# Patient Record
Sex: MALE | Race: WHITE | Employment: OTHER | URBAN - METROPOLITAN AREA
[De-identification: names, ages, dates, MRNs, and addresses within clinical notes are randomized per-mention and may not be internally consistent; named-entity substitution may affect disease eponyms.]

---

## 2018-01-02 ENCOUNTER — APPOINTMENT (OUTPATIENT)
Dept: GENERAL RADIOLOGY | Age: 68
DRG: 853 | End: 2018-01-02
Attending: INTERNAL MEDICINE
Payer: MEDICARE

## 2018-01-02 ENCOUNTER — HOSPITAL ENCOUNTER (INPATIENT)
Age: 68
LOS: 7 days | Discharge: HOME HEALTH | DRG: 853 | End: 2018-01-09
Attending: EMERGENCY MEDICINE | Admitting: INTERNAL MEDICINE
Payer: MEDICARE

## 2018-01-02 ENCOUNTER — APPOINTMENT (OUTPATIENT)
Dept: CT IMAGING | Age: 68
DRG: 853 | End: 2018-01-02
Attending: SPECIALIST
Payer: MEDICARE

## 2018-01-02 DIAGNOSIS — R41.0 ACUTE DELIRIUM: ICD-10-CM

## 2018-01-02 DIAGNOSIS — G00.9 BACTERIAL MENINGITIS: Primary | ICD-10-CM

## 2018-01-02 PROBLEM — J18.9 PNEUMONIA: Status: ACTIVE | Noted: 2018-01-02

## 2018-01-02 LAB
ALBUMIN SERPL-MCNC: 2.6 G/DL (ref 3.5–5)
ALBUMIN/GLOB SERPL: 0.6 {RATIO} (ref 1.1–2.2)
ALP SERPL-CCNC: 89 U/L (ref 45–117)
ALT SERPL-CCNC: 36 U/L (ref 12–78)
AMYLASE SERPL-CCNC: 120 U/L (ref 25–115)
ANION GAP SERPL CALC-SCNC: 9 MMOL/L (ref 5–15)
AST SERPL-CCNC: 19 U/L (ref 15–37)
BASOPHILS # BLD: 0 K/UL (ref 0–0.1)
BASOPHILS NFR BLD: 0 % (ref 0–1)
BILIRUB SERPL-MCNC: 1.9 MG/DL (ref 0.2–1)
BUN SERPL-MCNC: 18 MG/DL (ref 6–20)
BUN/CREAT SERPL: 14 (ref 12–20)
CALCIUM SERPL-MCNC: 8.9 MG/DL (ref 8.5–10.1)
CHLORIDE SERPL-SCNC: 104 MMOL/L (ref 97–108)
CO2 SERPL-SCNC: 25 MMOL/L (ref 21–32)
CREAT SERPL-MCNC: 1.3 MG/DL (ref 0.7–1.3)
DIFFERENTIAL METHOD BLD: ABNORMAL
EOSINOPHIL # BLD: 0 K/UL (ref 0–0.4)
EOSINOPHIL NFR BLD: 0 % (ref 0–7)
ERYTHROCYTE [DISTWIDTH] IN BLOOD BY AUTOMATED COUNT: 12.5 % (ref 11.5–14.5)
GLOBULIN SER CALC-MCNC: 4.4 G/DL (ref 2–4)
GLUCOSE SERPL-MCNC: 140 MG/DL (ref 65–100)
HCT VFR BLD AUTO: 39.3 % (ref 36.6–50.3)
HGB BLD-MCNC: 13.5 G/DL (ref 12.1–17)
LACTATE SERPL-SCNC: 2.1 MMOL/L (ref 0.4–2)
LIPASE SERPL-CCNC: 478 U/L (ref 73–393)
LYMPHOCYTES # BLD: 0.8 K/UL (ref 0.8–3.5)
LYMPHOCYTES NFR BLD: 4 % (ref 12–49)
MCH RBC QN AUTO: 30.2 PG (ref 26–34)
MCHC RBC AUTO-ENTMCNC: 34.4 G/DL (ref 30–36.5)
MCV RBC AUTO: 87.9 FL (ref 80–99)
MONOCYTES # BLD: 0.6 K/UL (ref 0–1)
MONOCYTES NFR BLD: 3 % (ref 5–13)
NEUTS BAND NFR BLD MANUAL: 9 %
NEUTS SEG # BLD: 19.5 K/UL (ref 1.8–8)
NEUTS SEG NFR BLD: 84 % (ref 32–75)
PLATELET # BLD AUTO: 245 K/UL (ref 150–400)
PLATELET COMMENTS,PCOM: ABNORMAL
POTASSIUM SERPL-SCNC: 4 MMOL/L (ref 3.5–5.1)
PROT SERPL-MCNC: 7 G/DL (ref 6.4–8.2)
RBC # BLD AUTO: 4.47 M/UL (ref 4.1–5.7)
RBC MORPH BLD: ABNORMAL
SODIUM SERPL-SCNC: 138 MMOL/L (ref 136–145)
WBC # BLD AUTO: 20.9 K/UL (ref 4.1–11.1)
WBC MORPH BLD: ABNORMAL

## 2018-01-02 PROCEDURE — 74011000258 HC RX REV CODE- 258: Performed by: EMERGENCY MEDICINE

## 2018-01-02 PROCEDURE — 74011000258 HC RX REV CODE- 258: Performed by: INTERNAL MEDICINE

## 2018-01-02 PROCEDURE — 96360 HYDRATION IV INFUSION INIT: CPT

## 2018-01-02 PROCEDURE — 74011250636 HC RX REV CODE- 250/636: Performed by: EMERGENCY MEDICINE

## 2018-01-02 PROCEDURE — 74011250636 HC RX REV CODE- 250/636: Performed by: INTERNAL MEDICINE

## 2018-01-02 PROCEDURE — 82150 ASSAY OF AMYLASE: CPT | Performed by: INTERNAL MEDICINE

## 2018-01-02 PROCEDURE — 99284 EMERGENCY DEPT VISIT MOD MDM: CPT

## 2018-01-02 PROCEDURE — 85025 COMPLETE CBC W/AUTO DIFF WBC: CPT | Performed by: EMERGENCY MEDICINE

## 2018-01-02 PROCEDURE — 83690 ASSAY OF LIPASE: CPT | Performed by: INTERNAL MEDICINE

## 2018-01-02 PROCEDURE — 80053 COMPREHEN METABOLIC PANEL: CPT | Performed by: EMERGENCY MEDICINE

## 2018-01-02 PROCEDURE — 74011250637 HC RX REV CODE- 250/637

## 2018-01-02 PROCEDURE — 65270000029 HC RM PRIVATE

## 2018-01-02 PROCEDURE — 77030029684 HC NEB SM VOL KT MONA -A

## 2018-01-02 PROCEDURE — 74011000250 HC RX REV CODE- 250: Performed by: INTERNAL MEDICINE

## 2018-01-02 PROCEDURE — 87040 BLOOD CULTURE FOR BACTERIA: CPT | Performed by: INTERNAL MEDICINE

## 2018-01-02 PROCEDURE — 36600 WITHDRAWAL OF ARTERIAL BLOOD: CPT

## 2018-01-02 PROCEDURE — 36415 COLL VENOUS BLD VENIPUNCTURE: CPT | Performed by: EMERGENCY MEDICINE

## 2018-01-02 PROCEDURE — 83605 ASSAY OF LACTIC ACID: CPT | Performed by: INTERNAL MEDICINE

## 2018-01-02 RX ORDER — VANCOMYCIN HYDROCHLORIDE
1250
Status: DISCONTINUED | OUTPATIENT
Start: 2018-01-02 | End: 2018-01-03

## 2018-01-02 RX ORDER — SODIUM CHLORIDE 9 MG/ML
75 INJECTION, SOLUTION INTRAVENOUS CONTINUOUS
Status: DISCONTINUED | OUTPATIENT
Start: 2018-01-02 | End: 2018-01-09 | Stop reason: HOSPADM

## 2018-01-02 RX ORDER — ALBUTEROL SULFATE 0.83 MG/ML
1.25 SOLUTION RESPIRATORY (INHALATION)
Status: DISCONTINUED | OUTPATIENT
Start: 2018-01-02 | End: 2018-01-04

## 2018-01-02 RX ORDER — NALOXONE HYDROCHLORIDE 0.4 MG/ML
0.4 INJECTION, SOLUTION INTRAMUSCULAR; INTRAVENOUS; SUBCUTANEOUS AS NEEDED
Status: DISCONTINUED | OUTPATIENT
Start: 2018-01-02 | End: 2018-01-09 | Stop reason: HOSPADM

## 2018-01-02 RX ORDER — HALOPERIDOL 5 MG/ML
1 INJECTION INTRAMUSCULAR
Status: DISCONTINUED | OUTPATIENT
Start: 2018-01-02 | End: 2018-01-09 | Stop reason: HOSPADM

## 2018-01-02 RX ORDER — HYDROCODONE BITARTRATE AND ACETAMINOPHEN 5; 325 MG/1; MG/1
1 TABLET ORAL
Status: DISCONTINUED | OUTPATIENT
Start: 2018-01-02 | End: 2018-01-09 | Stop reason: HOSPADM

## 2018-01-02 RX ORDER — ACETAMINOPHEN 650 MG/1
650 SUPPOSITORY RECTAL
Status: DISCONTINUED | OUTPATIENT
Start: 2018-01-02 | End: 2018-01-05

## 2018-01-02 RX ORDER — ACETAMINOPHEN 650 MG/1
SUPPOSITORY RECTAL
Status: COMPLETED
Start: 2018-01-02 | End: 2018-01-02

## 2018-01-02 RX ORDER — HEPARIN SODIUM 5000 [USP'U]/ML
5000 INJECTION, SOLUTION INTRAVENOUS; SUBCUTANEOUS EVERY 8 HOURS
Status: DISCONTINUED | OUTPATIENT
Start: 2018-01-02 | End: 2018-01-09 | Stop reason: HOSPADM

## 2018-01-02 RX ORDER — HYDROMORPHONE HYDROCHLORIDE 2 MG/ML
0.5 INJECTION, SOLUTION INTRAMUSCULAR; INTRAVENOUS; SUBCUTANEOUS
Status: DISCONTINUED | OUTPATIENT
Start: 2018-01-02 | End: 2018-01-09 | Stop reason: HOSPADM

## 2018-01-02 RX ORDER — SODIUM CHLORIDE 0.9 % (FLUSH) 0.9 %
5-10 SYRINGE (ML) INJECTION AS NEEDED
Status: DISCONTINUED | OUTPATIENT
Start: 2018-01-02 | End: 2018-01-09 | Stop reason: HOSPADM

## 2018-01-02 RX ORDER — SODIUM CHLORIDE 0.9 % (FLUSH) 0.9 %
5-10 SYRINGE (ML) INJECTION EVERY 8 HOURS
Status: DISCONTINUED | OUTPATIENT
Start: 2018-01-02 | End: 2018-01-09 | Stop reason: HOSPADM

## 2018-01-02 RX ADMIN — AMPICILLIN SODIUM 2 G: 2 INJECTION, POWDER, FOR SOLUTION INTRAVENOUS at 22:28

## 2018-01-02 RX ADMIN — ACETAMINOPHEN 650 MG: 650 SUPPOSITORY RECTAL at 14:41

## 2018-01-02 RX ADMIN — SODIUM CHLORIDE 1000 ML: 900 INJECTION, SOLUTION INTRAVENOUS at 11:36

## 2018-01-02 RX ADMIN — HEPARIN SODIUM 5000 UNITS: 5000 INJECTION, SOLUTION INTRAVENOUS; SUBCUTANEOUS at 22:26

## 2018-01-02 RX ADMIN — HEPARIN SODIUM 5000 UNITS: 5000 INJECTION, SOLUTION INTRAVENOUS; SUBCUTANEOUS at 14:06

## 2018-01-02 RX ADMIN — CEFTRIAXONE 2 G: 2 INJECTION, POWDER, FOR SOLUTION INTRAMUSCULAR; INTRAVENOUS at 18:45

## 2018-01-02 RX ADMIN — Medication 10 ML: at 14:00

## 2018-01-02 RX ADMIN — ALBUTEROL SULFATE 1.25 MG: 2.5 SOLUTION RESPIRATORY (INHALATION) at 20:00

## 2018-01-02 RX ADMIN — Medication 10 ML: at 22:26

## 2018-01-02 RX ADMIN — VANCOMYCIN HYDROCHLORIDE 1250 MG: 10 INJECTION, POWDER, LYOPHILIZED, FOR SOLUTION INTRAVENOUS at 23:00

## 2018-01-02 RX ADMIN — AMPICILLIN SODIUM 2 G: 2 INJECTION, POWDER, FOR SOLUTION INTRAVENOUS at 17:59

## 2018-01-02 RX ADMIN — SODIUM CHLORIDE 125 ML/HR: 900 INJECTION, SOLUTION INTRAVENOUS at 14:08

## 2018-01-02 RX ADMIN — DEXAMETHASONE SODIUM PHOSPHATE 12.5 MG: 4 INJECTION, SOLUTION INTRAMUSCULAR; INTRAVENOUS at 19:30

## 2018-01-02 RX ADMIN — HYDROMORPHONE HYDROCHLORIDE 0.5 MG: 2 INJECTION, SOLUTION INTRAMUSCULAR; INTRAVENOUS; SUBCUTANEOUS at 14:06

## 2018-01-02 RX ADMIN — HALOPERIDOL 1 MG: 5 INJECTION INTRAMUSCULAR at 22:23

## 2018-01-02 RX ADMIN — AMPICILLIN SODIUM 2 G: 2 INJECTION, POWDER, FOR SOLUTION INTRAVENOUS at 13:16

## 2018-01-02 NOTE — H&P
History & Physical  Venmelvina Ridfelecia, DO    Date of admission: 1/2/2018    Patient name: Malachi Lynch  MRN: [id number]  YOB: 1950  Age: 75 y.o. Primary care provider:  Wilber Barber MD     Source of Information: medical records                                   Chief complaint: Change in mental status    History of present illness  Malachi Lynch is a 75 y.o. male who was transferred here from an [facility] system. He went to the ER there for change in mental status, he had a complete workup including CXR, Head CT, lumbar puncture, blood cultures, Urine culture. He was found to have pneumonia, possible mastoiditis on the right, and bacterial meningitis. He was transferred from that ER to the [facility] ER and the hospitalist was called and asked to admit the patient to the ICU. In review of the chart the patient was noted to have had a Tmax at OSH of 104. 4., tested negative for the flu, CSF with WBC of 23 with 94% neutrophils, and WBC of 18.7. He had been given at different times in their ER Vancomycin 2gm, Ceftriaxone 2gm, Zosyn 3.375gms. He was started on Ampicillin 2gms in [facility] ER. He will be admitted to the ICU for further evaluation and treatment. Past Medical History:   Diagnosis Date    GERD (gastroesophageal reflux disease)     Hypertension       History reviewed. No pertinent surgical history. Prior to Admission medications    Not on File     No Known Allergies   History reviewed. No pertinent family history. Social history  Patient resides    Independently    x  With family; with wife     Assisted living      SNF    Ambulates    Independently      With cane       Assisted walker         Alcohol history     None     Social     Chronic   Smoking history    None   x  Former smoker     Current smoker     Denies illicit drug use.     History   Smoking Status    Former Smoker Smokeless Tobacco    Never Used       Code status  x  Full code     DNR/DNI        Code status discussed with the patient/caregivers. Review of systems  Review of systems not obtained due to patient factors. The remainder of the review of systems was reviewed and is noncontributory. Physical Examination   Visit Vitals    /66    Pulse (!) 102    Temp 99.2 °F (37.3 °C)    Resp 24    SpO2 100%          O2 Device: Room air    Gen: sleeping can awaken   Head: NCAT  EENT:will not allow me to examine   Neck: supple, no masses  Lungs: faint crackles at base  CVS: regular rhythm, normal rate, S1S2, no m/r/g appreciated, no peripheral edema, +pulses  Abd: soft TTP diffusely  MSK: moves all extremities  Neuro: Alert, delirium, responds to questions but not always with appropriate response. Skin: no rash noted, warm to touch    Data Review    EK Hour Results:  Recent Results (from the past 24 hour(s))   CBC WITH AUTOMATED DIFF    Collection Time: 18 11:32 AM   Result Value Ref Range    WBC 20.9 (H) 4.1 - 11.1 K/uL    RBC 4.47 4.10 - 5.70 M/uL    HGB 13.5 12.1 - 17.0 g/dL    HCT 39.3 36.6 - 50.3 %    MCV 87.9 80.0 - 99.0 FL    MCH 30.2 26.0 - 34.0 PG    MCHC 34.4 30.0 - 36.5 g/dL    RDW 12.5 11.5 - 14.5 %    PLATELET 186 274 - 968 K/uL    NEUTROPHILS 84 (H) 32 - 75 %    BAND NEUTROPHILS 9 %    LYMPHOCYTES 4 (L) 12 - 49 %    MONOCYTES 3 (L) 5 - 13 %    EOSINOPHILS 0 0 - 7 %    BASOPHILS 0 0 - 1 %    ABS. NEUTROPHILS 19.5 (H) 1.8 - 8.0 K/UL    ABS. LYMPHOCYTES 0.8 0.8 - 3.5 K/UL    ABS. MONOCYTES 0.6 0.0 - 1.0 K/UL    ABS. EOSINOPHILS 0.0 0.0 - 0.4 K/UL    ABS.  BASOPHILS 0.0 0.0 - 0.1 K/UL    DF MANUAL      PLATELET COMMENTS LARGE PLATELETS      RBC COMMENTS NORMOCYTIC, NORMOCHROMIC      WBC COMMENTS DOHLE BODIES     METABOLIC PANEL, COMPREHENSIVE    Collection Time: 18 11:32 AM   Result Value Ref Range    Sodium 138 136 - 145 mmol/L    Potassium 4.0 3.5 - 5.1 mmol/L    Chloride 104 97 - 108 mmol/L    CO2 25 21 - 32 mmol/L    Anion gap 9 5 - 15 mmol/L    Glucose 140 (H) 65 - 100 mg/dL    BUN 18 6 - 20 MG/DL    Creatinine 1.30 0.70 - 1.30 MG/DL    BUN/Creatinine ratio 14 12 - 20      GFR est AA >60 >60 ml/min/1.73m2    GFR est non-AA 55 (L) >60 ml/min/1.73m2    Calcium 8.9 8.5 - 10.1 MG/DL    Bilirubin, total 1.9 (H) 0.2 - 1.0 MG/DL    ALT (SGPT) 36 12 - 78 U/L    AST (SGOT) 19 15 - 37 U/L    Alk. phosphatase 89 45 - 117 U/L    Protein, total 7.0 6.4 - 8.2 g/dL    Albumin 2.6 (L) 3.5 - 5.0 g/dL    Globulin 4.4 (H) 2.0 - 4.0 g/dL    A-G Ratio 0.6 (L) 1.1 - 2.2       Recent Labs      01/02/18   1132   WBC  20.9*   HGB  13.5   HCT  39.3   PLT  245     Recent Labs      01/02/18   1132   NA  138   K  4.0   CL  104   CO2  25   GLU  140*   BUN  18   CREA  1.30   CA  8.9   ALB  2.6*   SGOT  19   ALT  36     Imaging  none    Assessment and Plan   Active Problems:    Pneumonia (1/2/2018)      Bacterial meningitis (1/2/2018)    1. Bacterial Meningitis: admit to ICU, Intensivist and ID consulted, started on ampicillin 2g, will continue this [id number]. Cultures ordered at OSH but unlikely we will get results. Will order blood cultures, urine cultures, sputum cultures. Start IVF at 125cc/hr, keep in isolation. 2. Pneumonia: repeat cxr in am, given vanc rocephine, zosyn at OSH, now on ampicillin. ID consulted. Albuterol nebs [id number], sputum cx ordered. 3. Mastoiditis: noted on CT head from OSH. Continue with IV abx, may need repeat CT head when stable. 4. Change in Mental status: likely secondary to #1. Monitor.     Diet:regular  Activity: bedrest  DVT prophylaxis: heparin  Isolation precautions: droplet  Consultations: ID and intensivist  Anticipated disposition: 5-7 days       Signed by: Mariah Pettit DO    January 2, 2018 at 12:24 PM
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
## 2018-01-02 NOTE — CONSULTS
Otolaryngology (ENT) Consult    Subjective:     Date of Consultation:  January 2, 2018    Referring Physician: Ariana Terry    History of Present Illness:   75 y.o. male x[facility] from [facility] with altered mental state and possible meningitis. , pt is unable to participate in providing a hx at this time. He was originally admitted to the ICU and I was asked to see him for possible otomastoiditis causing meningitis. Review of his chart reveals  6 days ago with vomiting, HA, fever, diarrhea, and AMS. He was prescribed an antibiotic. Pt CSF was tapped and appears to have bacterial meningitis. Head CT only shows possible opacification of the mastoid aircells. ATSP to drain mastoid if needed. Patient Active Problem List    Diagnosis Date Noted    Pneumonia 01/02/2018    Bacterial meningitis 01/02/2018     Past Medical History:   Diagnosis Date    GERD (gastroesophageal reflux disease)     Hypertension       History reviewed. No pertinent family history. Social History   Substance Use Topics    Smoking status: Former Smoker    Smokeless tobacco: Never Used    Alcohol use No     History reviewed. No pertinent surgical history.    Current Facility-Administered Medications   Medication Dose Route Frequency    sodium chloride (NS) flush 5-10 mL  5-10 mL IntraVENous Q8H    sodium chloride (NS) flush 5-10 mL  5-10 mL IntraVENous PRN    0.9% sodium chloride infusion  125 mL/hr IntraVENous CONTINUOUS    HYDROcodone-acetaminophen (NORCO) 5-325 mg per tablet 1 Tab  1 Tab Oral Q4H PRN    HYDROmorphone (PF) (DILAUDID) injection 0.5 mg  0.5 mg IntraVENous Q4H PRN    naloxone (NARCAN) injection 0.4 mg  0.4 mg IntraVENous PRN    albuterol (PROVENTIL VENTOLIN) nebulizer solution 1.25 mg  1.25 mg Nebulization Q6H RT    heparin (porcine) injection 5,000 Units  5,000 Units SubCUTAneous Q8H    ampicillin (OMNIPEN) 2 g in 0.9% sodium chloride 100 mL IVPB  2 g IntraVENous Q4H    haloperidol lactate (HALDOL) injection 1 mg  1 mg IntraVENous Q4H PRN    acetaminophen (TYLENOL) suppository 650 mg  650 mg Rectal Q4H PRN    cefTRIAXone (ROCEPHIN) 2 g in 0.9% sodium chloride (MBP/ADV) 50 mL  2 g IntraVENous Q12H    dexamethasone (DECADRON) 12.5 mg in 0.9% sodium chloride 50 mL IVPB  12.5 mg IntraVENous Q6H      No Known Allergies     Review of Systems:  Review of systems not obtained due to patient factors. Objective:     Patient Vitals for the past 8 hrs:   BP Temp Pulse Resp SpO2 Height Weight   18 1651 - - - - - 5' 9\" (1.753 m) 89.4 kg (197 lb 1.5 oz)   18 1641 135/74 99 °F (37.2 °C) (!) 109 20 97 % - -   18 1519 136/68 (!) 100.8 °F (38.2 °C) 99 18 93 % - -   18 1500 170/81 - (!) 109 27 97 % - -   18 1345 155/79 (!) 101.3 °F (38.5 °C) (!) 116 27 98 % - -   18 1318 129/50 100.4 °F (38 °C) (!) 102 24 98 % - -   18 1200 132/66 - (!) 102 24 100 % - -   18 1100 148/63 - (!) 102 - 100 % - -   18 1030 136/52 99.2 °F (37.3 °C) (!) 103 18 98 % - -     Temp (24hrs), Av.1 °F (37.8 °C), Min:99 °F (37.2 °C), Max:101.3 °F (38.5 °C)         Physical Exam:   Au clear  Nose clear  Neck no masses  Oc clear  Chest clear  Heart RRR  Not oriented to time or place and can not answer direct easy questions     Assessment:     Bacterial Meningitis possible Mastoid as the source    Plan:     1. Temporal bone scan with contrast   2. Possible need for mastoidectomy with ear tube based on imaging  3.  Following closely        Signed By: Cee East MD     2018

## 2018-01-02 NOTE — ED NOTES
RN spoke with Bella Mac RN from [address] [place] [address] and RN stated patients blood cultures are growing gram positive pairs and chains. RN will fax results.

## 2018-01-02 NOTE — IP AVS SNAPSHOT
[facility] [address] [place] [address] 
[phone] Patient: Rafael Moreno MRN: [id number] UYE:3[birth year] About your hospitalization You were admitted on:  January 2, 2018 You last received care in the:  [facility] You were discharged on:  January 9, 2018 Why you were hospitalized Your primary diagnosis was:  Not on File Your diagnoses also included:  Pneumonia, Bacterial Meningitis Follow-up Information Follow up With Details Comments Contact Info [address] IV medication [address] [facility] [address] [address] [phone] [address] [place] In 1 day Home [phone] Peter Tran MD In 1 week  [facility] [address] [place] [address] 
[phone] Mark Anthony Dos Santos MD In 1 week  [facility] and [address] [place] [address] 
[phone] Mekhi Andrew MD In 2 weeks  [address] [place] [address] [address] [facility] Internal Medicine [address] [place] [address] 
[phone] Discharge Orders None A check cathy indicates which time of day the medication should be taken. My Medications START taking these medications Instructions Each Dose to Equal  
 Morning Noon Evening Bedtime  
 butalbital-acetaminophen-caffeine -40 mg per tablet Commonly known as:  Steve Haste Your last dose was: Your next dose is: Take 1 Tab by mouth every four (4) hours as needed for Headache. Indications: TENSION-TYPE HEADACHE  
 1 Tab  
    
   
   
   
  
 ciprofloxacin-dexamethasone 0.3-0.1 % otic suspension Commonly known as:  Arin Nguyen Your last dose was: Your next dose is:    
   
   
 Administer 4 Drops in right ear two (2) times a day for 10 days. Indications: Acute Otitis Media with Tympanostomy Tubes 4 Drop CONTINUE taking these medications Instructions Each Dose to Equal  
 Morning Noon Evening Bedtime AVAPRO 150 mg tablet Generic drug:  irbesartan Your last dose was: Your next dose is: Take 150 mg by mouth nightly. 150 mg FLOMAX 0.4 mg capsule Generic drug:  tamsulosin Your last dose was: Your next dose is: Take 0.4 mg by mouth daily. 0.4 mg  
    
   
   
   
  
 FLONASE 50 mcg/actuation nasal spray Generic drug:  fluticasone Your last dose was: Your next dose is: 2 Sprays by Both Nostrils route daily. 2 [place] MARIA ELENA-SYNEPHRINE 12 H SPR (OXYM) 0.05 % nasal spray Generic drug:  oxymetazoline Your last dose was: Your next dose is: 2 Sprays two (2) times a day. 2 Spray Omeprazole delayed release 20 mg tablet Commonly known as:  PRILOSEC D/R Your last dose was: Your next dose is: Take 20 mg by mouth daily. 20 mg  
    
   
   
   
  
  
STOP taking these medications TUSSIONEX PENNKINETIC ER 10-8 mg/5 mL suspension Generic drug:  chlorpheniramine-HYDROcodone ZITHROMAX 250 mg tablet Generic drug:  azithromycin Where to Get Your Medications Information on where to get these meds will be given to you by the nurse or doctor. ! Ask your nurse or doctor about these medications  
  butalbital-acetaminophen-caffeine -40 mg per tablet  
 ciprofloxacin-dexamethasone 0.3-0.1 % otic suspension Discharge Instructions Discharge Instructions PATIENT ID: Marlin Lane MRN: [id number] YOB: 1950 DATE OF ADMISSION: 1/2/2018 10:15 AM   
DATE OF DISCHARGE: 1/9/2018 PRIMARY CARE PROVIDER: Milton Sargent MD  
 
ATTENDING PHYSICIAN: Letitia Ruiz MD 
DISCHARGING PROVIDER: Letitia Ruiz MD   
 To contact this individual call [phone] and ask the  to page. If unavailable ask to be transferred the Adult Hospitalist Department. DISCHARGE DIAGNOSES  
 
CONSULTATIONS: IP CONSULT TO HOSPITALIST 
IP CONSULT TO INFECTIOUS DISEASES 
IP CONSULT TO INFECTIOUS DISEASES 
IP CONSULT TO OTOLARYNGOLOGY 
IP CONSULT TO CARDIOLOGY 
IP CONSULT TO PULMONOLOGY PROCEDURES/SURGERIES: Procedure(s): TYMPANOMASTOIDECTOMY AND EAR TUBES- RIGHT; PLACEMENT OF FACIAL NERVE ELECTRODES PENDING TEST RESULTS:  
At the time of discharge the following test results are still pending: none FOLLOW UP APPOINTMENTS:  
Follow-up Information Follow up With Details Comments Contact Info [address] IV medication [address] [facility] [address] [address] [phone] [address] [place] In 1 day Home [phone] Hair Liu MD In 1 week  [address] [facility] [address] [place] 
[phone] Char Davidson MD In 1 week  [facility] and [address] [place] [id number] [phone] Morelia Kwok MD In 2 weeks  [address] [facility] [address] [facility] Internal Medicine [address] [place] 
[phone] ADDITIONAL CARE RECOMMENDATIONS:  
Follow up with Dr Raquel Salgado and PMD 
 
Care of Midline including biopatch 2) Ceftriaxone 2 grams IV Every 12 hours until 1/22/18 3) CBC/CMP on 1/15 and 1/23/18 4) remove midline on 1/23/18 5) Fax results to [phone] [facility] 6) call Michael Tinoco on [phone] with any questions or for critical value DIET: Cardiac Diet ACTIVITY: Activity as tolerated DISCHARGE MEDICATIONS: 
 See Medication Reconciliation Form · It is important that you take the medication exactly as they are prescribed. · Keep your medication in the bottles provided by the pharmacist and keep a list of the medication names, dosages, and times to be taken in your wallet. · Do not take other medications without consulting your doctor. NOTIFY YOUR PHYSICIAN FOR ANY OF THE FOLLOWING:  
Fever over 101 degrees for 24 hours. Chest pain, shortness of breath, fever, chills, nausea, vomiting, diarrhea, change in mentation, falling, weakness, bleeding. Severe pain or pain not relieved by medications. Or, any other signs or symptoms that you may have questions about. DISPOSITION: 
 x Home With: 
 OT  PT  [facility]  RN  
  
 SNF/Inpatient Rehab/LTAC Independent/assisted living Hospice Other: CDMP Checked:  
Yes x PROBLEM LIST Updated: 
Yes x Signed:  
Darren Regan MD 
1/9/2018 11:51 AM 
 
  
  
  
Urlist Announcement We are excited to announce that we are making your provider's discharge notes available to you in Urlist. You will see these notes when they are completed and signed by the physician that discharged you from your recent hospital stay. If you have any questions or concerns about any information you see in Urlist, please call the Health Information Department where you were seen or reach out to your Primary Care Provider for more information about your plan of care. Introducing [facility]! David Juarez introduces Urlist patient portal. Now you can access parts of your medical record, email your doctor's office, and request medication refills online. 1. In your internet browser, go to [email or web address] 2. Click on the First Time User? Click Here link in the Sign In box. You will see the New Member Sign Up page. 3. Enter your Urlist Access Code exactly as it appears below. You will not need to use this code after youve completed the sign-up process. If you do not sign up before the expiration date, you must request a new code. · Urlist Access Code: [email or web address] Expires: 4/2/2018 10:47 AM 
 
4.  Enter the last four digits of your Social Security Number (xxxx) and Date of Birth (mm/dd/yyyy) as indicated and click Submit. You will be taken to the next sign-up page. 5. Create a [employer] ID. This will be your [employer]ition[employer] login ID and cannot be changed, so think of one that is secure and easy to remember. 6. Create a [employer] password. You can change your password at any time. 7. Enter your Password Reset Question and Answer. This can be used at a later time if you forget your password. 8. Enter your e-mail address. You will receive e-mail notification when new information is available in [address] 9. Click Sign Up. You can now view and download portions of your medical record. 10. Click the Download Summary menu link to download a portable copy of your medical information. If you have questions, please visit the Frequently Asked Questions section of the [email or web address] website. Remember, [email or web address] is NOT to be used for urgent needs. For medical emergencies, dial 911. Now available from your iPhone and Android! Unresulted Labs-Please follow up with your PCP about these lab tests Order Current Status CARBOXY HEMOGLOBIN In process CULTURE, BLOOD, PAIRED Preliminary result CULTURE, FUNGUS Preliminary result Providers Seen During Your Hospitalization Provider Specialty Primary office phone Junior Hardin DO Emergency Medicine [phone] Charmayne Evangelist, DO Hospitalist [phone] Ren Ba MD Internal Medicine [phone] Rachael Preston MD Hospitalist [phone] Sam Cheng MD Internal Medicine [phone] Your Primary Care Physician (PCP) Primary Care Physician Office Phone Office Fax Karen Height  You are allergic to the following Allergen Reactions Hurricaine (Benzocaine) Other (comments) Methemoglobinemia Recent Documentation Height Weight BMI Smoking Status 1.727 m 88.7 kg 29.73 kg/m2 Former Smoker Emergency Contacts Name Discharge Info Relation Home Work Mobile Sebastián Olsen DISCHARGE CAREGIVER [3] Spouse [3]   [phone] Patient Belongings The following personal items are in your possession at time of discharge: 
  Dental Appliances: None         Home Medications: None   Jewelry: Ring  Clothing: None    Other Valuables: None Please provide this summary of care documentation to your next provider. Signatures-by signing, you are acknowledging that this After Visit Summary has been reviewed with you and you have received a copy. Patient Signature:  ____________________________________________________________ Date:  ____________________________________________________________  
  
[facility] Provider Signature:  ____________________________________________________________ Date:  ____________________________________________________________

## 2018-01-02 NOTE — PROGRESS NOTES
1300 TRANSFER - IN REPORT:    Verbal report received from 702 [facility] MADAY Marr(name) on [facility]  being received from ED(unit) for routine progression of care      Report consisted of patients Situation, Background, Assessment and   Recommendations(SBAR). Information from the following report(s) SBAR, Kardex, ED Summary, Procedure Summary, Intake/Output, MAR and Recent Results was reviewed with the receiving nurse. Opportunity for questions and clarification was provided. Assessment completed upon patients arrival to unit and care assumed. 1345 Pt arrived to unit from ED. Pt restless, pulling at medical equipment. Is oriented to self, place only, ORR, does not follow commands. Intensivist at bedside to evaluate pt. Per MD- OK for pt to transfer to medical floor. Orders received. Primary Nurse Eliseo Caballero and Yanique Wong RN performed a dual skin assessment on this patient No impairment noted. Peter score is 19.  1400 Dr. Gabi Aguirre updated on pt status, that pt is restless, pulling at Fall and IVs, OK for soft-wrist restraints. MD notified of Dr. Bea Rosales order for medical, attending agrees. Attending also notified of blood culture results from [facility] reported to ED RN. Per Dr. Gabi Aguirre- repeat blood cultures, lactic acid. Aurelioa Yumiko 984 Dr. Gabi Aguirre notified of axillary temp 101.3. Orders received for PRN tylenol suppository. 1431 Blood cultures, lactic acid sent. 1500 Dr. Sonia Harris at bedside to evaluate pt. Rash noticed to pt's back, MD aware. 1557 TRANSFER - OUT REPORT:    Verbal report given to Darcie Castillo RN(name) on [facility]  being transferred to 6E(unit) for routine progression of care       Report consisted of patients Situation, Background, Assessment and   Recommendations(SBAR). Information from the following report(s) SBAR, Kardex, ED Summary, Procedure Summary, Intake/Output, MAR and Recent Results was reviewed with the receiving nurse.     Lines:   Peripheral IV 01/02/18 Left Antecubital (Active)   Site Assessment Clean, dry, & intact 1/2/2018  3:00 PM   Phlebitis Assessment 0 1/2/2018  3:00 PM   Infiltration Assessment 0 1/2/2018  3:00 PM   Dressing Status Clean, dry, & intact 1/2/2018  3:00 PM   Dressing Type Transparent 1/2/2018  3:00 PM   Hub Color/Line Status Pink;Flushed;Patent; Infusing 1/2/2018  3:00 PM   Action Taken Open ports on tubing capped 1/2/2018  3:00 PM   Alcohol Cap Used Yes 1/2/2018  3:00 PM        Opportunity for questions and clarification was provided.       Patient transported with:   [employer]

## 2018-01-02 NOTE — ED TRIAGE NOTES
Triage Note: Patient arrives with EMS from [place] [address] with diagnosis of Bacterial Meninigitis. Patient has received Decadron, Ativan, Rocephin and 3 L NS.   Patient finished 2 gm of Vancomycin en route to the hospital.

## 2018-01-02 NOTE — PROGRESS NOTES
PCCM  Pt does not meet ICU admission criteria ( please review [employer] guidelines). Meningitis by itself without organ dysfxn requiring critical care support ( pressors, vent etc) is NOT an indication for ICU admission. I have placed transfer orders to a medical bed. If pt should require critical care in the future we'd be happy to get involved. ID consult noted. Will cancel \"intensivist\" consult since he is not critically ill.

## 2018-01-02 NOTE — IP AVS SNAPSHOT
[address] 
[phone] Patient: Paul Little MRN: [id number] MHC:[birth year] A check cathy indicates which time of day the medication should be taken. My Medications START taking these medications Instructions Each Dose to Equal  
 Morning Noon Evening Bedtime  
 butalbital-acetaminophen-caffeine -40 mg per tablet Commonly known as:  Gwendolyn Lefort Your last dose was: Your next dose is: Take 1 Tab by mouth every four (4) hours as needed for Headache. Indications: TENSION-TYPE HEADACHE  
 1 Tab  
    
   
   
   
  
 ciprofloxacin-dexamethasone 0.3-0.1 % otic suspension Commonly known as:  Kevyn Christine Your last dose was: Your next dose is:    
   
   
 Administer 4 Drops in right ear two (2) times a day for 10 days. Indications: Acute Otitis Media with Tympanostomy Tubes 4 Drop CONTINUE taking these medications Instructions Each Dose to Equal  
 Morning Noon Evening Bedtime AVAPRO 150 mg tablet Generic drug:  irbesartan Your last dose was: Your next dose is: Take 150 mg by mouth nightly. 150 mg FLOMAX 0.4 mg capsule Generic drug:  tamsulosin Your last dose was: Your next dose is: Take 0.4 mg by mouth daily. 0.4 mg  
    
   
   
   
  
 FLONASE 50 mcg/actuation nasal spray Generic drug:  fluticasone Your last dose was: Your next dose is: 2 Sprays by Both Nostrils route daily. 2 [place] MARIA ELENA-SYNEPHRINE 12 H SPR (OXYM) 0.05 % nasal spray Generic drug:  oxymetazoline Your last dose was: Your next dose is: 2 Sprays two (2) times a day. 2 Spray Omeprazole delayed release 20 mg tablet Commonly known as:  PRILOSEC D/R  
   
 Your last dose was: Your next dose is: Take 20 mg by mouth daily. 20 mg  
    
   
   
   
  
  
STOP taking these medications TUSSIONEX PENNKINETIC ER 10-8 mg/5 mL suspension Generic drug:  chlorpheniramine-HYDROcodone ZITHROMAX 250 mg tablet Generic drug:  azithromycin Where to Get Your Medications Information on where to get these meds will be given to you by the nurse or doctor. ! Ask your nurse or doctor about these medications  
  butalbital-acetaminophen-caffeine -40 mg per tablet  
 ciprofloxacin-dexamethasone 0.3-0.1 % otic suspension

## 2018-01-02 NOTE — ED PROVIDER NOTES
HPI Comments: 75 y.o. male with past medical history significant for HTN and GERD who presents as a transfer from an outside hospital ([address] [place] [address]). In his current altered mental state, pt is unable to participate in providing a hx at this time. He comes as a transfer for higher care for a DX of bacterial meningitis. On review of the pt's transfer records, the following information was obtained: Pt has been sick since 6 days ago with vomiting, HA, fever, diarrhea, and AMS. He was seen earlier by another physician and was dx with bronchitis and prescribed an antibiotic. Pt was at [facility] overnight and was dx with bacterial meningitis. CBC last night at 22:40 showed WBC 19,000. Lactic acid of 2.4. Lumbar puncture in the morning showed 2,300 white cells with 94% neutrophils, no RBC, and total protein >300. GS CSF had many WBC but had no bacteria. POC chem 8 showed bicard of 20 and creatinine of 1.3. Bilirubin 2300 last night was 1.6. UA showed microscopic hematuria. 2 blood cultures and urine culture pending. Pt was given 3.375 G Zosyn at 20:18 last night. Today, pt received 2 g of ceftriaxone at 0554, and 2 g vancomycin at 0700. Pt has received a total of 3 L of IV fluids total since last night and 13 mg of IV decadron. CT of brain revealed no acute intracranial abnormality. Flu swab negative. Social hx: Former smoker. No alcohol use. PCP: Nirmala Barillas MD    Old Chart Review: No prior visits in system. Full history, physical exam, and ROS unable to be obtained due to:  AMS. Note written by Elisabeth Redding, as dictated by Lyle Kolb DO 10:57 AM     The history is provided by medical records. The history is limited by the condition of the patient. No  was used. Past Medical History:   Diagnosis Date    GERD (gastroesophageal reflux disease)     Hypertension        History reviewed. No pertinent surgical history.       History reviewed. No pertinent family history. Social History     Social History    Marital status:      Spouse name: N/A    Number of children: N/A    Years of education: N/A     Occupational History    Not on file. Social History Main Topics    Smoking status: Former Smoker    Smokeless tobacco: Never Used    Alcohol use No    Drug use: No    Sexual activity: Not on file     Other Topics Concern    Not on file     Social History Narrative    No narrative on file         ALLERGIES: Review of patient's allergies indicates no known allergies. Review of Systems   Unable to perform ROS: Mental status change (AMS)       Vitals:    01/02/18 1030 01/02/18 1100 01/02/18 1200 01/02/18 1318   BP: 136/52 148/63 132/66 129/50   Pulse: (!) 103 (!) 102 (!) 102 (!) 102   Resp: 18  24 24   Temp: 99.2 °F (37.3 °C)   100.4 °F (38 °C)   SpO2: 98% 100% 100% 98%            Physical Exam        Constitutional: Pt is somnolent but wakes up to verbal stimuli. HENT:   Head: Normocephalic and atraumatic. Nose: Nose normal.   Mouth/Throat: Oropharynx is clear and moist. No oropharyngeal exudate. Eyes: Conjunctivae and extraocular motions are normal. Pupils are equal, round, and reactive to light. Pupils 3mm bilaterally. Right eye exhibits no discharge. Left eye exhibits no discharge. No scleral icterus. Neck: No tracheal deviation present. Supple neck. Cardiovascular: Tachycardic, regular rhythm, normal heart sounds and intact distal pulses. Exam reveals no gallop and no friction rub. No murmur heard. Pulmonary/Chest: Effort normal and breath sounds normal.  Pt  has no wheezes. Pt  has no rales. Mild tachypnea. No respiratory distress. Abdominal: Soft. Pt  exhibits no distension and no mass. No tenderness. Pt  has no rebound and no guarding. Musculoskeletal:  Pt  exhibits no edema and no tenderness.    Ext: Normal ROM in all four extremities; not tender to palpation; distal pulses are normal, no edema.    Skin: Skin is warm and dry. Pt  is not diaphoretic. Psychiatric:  Pt delirious and confused. Note written by Elisabeth Watson, as dictated by Claudia Castellano DO 11:00 AM            MDM  Number of Diagnoses or Management Options  Critical Care  Total time providing critical care: 30-74 minutes      30 minutes      ED Course       Procedures    CONSULT NOTE:  11:26 AM Claudia Castellano DO spoke with Dr. Dedrick Herring, Consult for Hospitalist.  Discussed available diagnostic tests and clinical findings. She is in agreement with care plans as outlined. Dr. Clifton Verma will see and admit the pt. CONSULT NOTE:  11:50 AM Claudia Castellano DO spoke with Dr. Linh Velez, Consult for Infectious disease. Discussed available diagnostic tests and clinical findings. Dr. Unique Daigle is in agreement with care plans and recommends administering 2 g of ampicillin and she will see the pt. Labs Reviewed   CBC WITH AUTOMATED DIFF - Abnormal; Notable for the following:        Result Value    WBC 20.9 (*)     NEUTROPHILS 84 (*)     LYMPHOCYTES 4 (*)     MONOCYTES 3 (*)     ABS. NEUTROPHILS 19.5 (*)     All other components within normal limits   METABOLIC PANEL, COMPREHENSIVE - Abnormal; Notable for the following:     Glucose 140 (*)     GFR est non-AA 55 (*)     Bilirubin, total 1.9 (*)     Albumin 2.6 (*)     Globulin 4.4 (*)     A-G Ratio 0.6 (*)     All other components within normal limits   CULTURE, BLOOD, PAIRED   CULTURE, RESPIRATORY/SPUTUM/BRONCH W GRAM STAIN   SAMPLES BEING HELD   LACTIC ACID   URINALYSIS W/MICROSCOPIC       ivf given  Iv ampicillin  ICU admit  Critically ill.

## 2018-01-02 NOTE — PROGRESS NOTES
TRANSFER - IN REPORT:    Verbal report received from [facility] (name) on [facility]  being received from ICU (unit) for routine progression of care      Report consisted of patients Situation, Background, Assessment and   Recommendations(SBAR). Information from the following report(s) SBAR, Kardex, Intake/Output, MAR and Recent Results was reviewed with the receiving nurse. Opportunity for questions and clarification was provided. Assessment completed upon patients arrival to unit and care assumed.

## 2018-01-02 NOTE — ED NOTES
TRANSFER - OUT REPORT:    Verbal report given to Ferny Osorio RN (name) on [facility]  being transferred to [address] [place] [address] (unit) for routine progression of care       Report consisted of patients Situation, Background, Assessment and   Recommendations(SBAR). Information from the following report(s) SBAR, Kardex, ED Summary, Intake/Output, MAR and Recent Results was reviewed with the receiving nurse. Lines:   Peripheral IV 01/02/18 Left Antecubital (Active)   Site Assessment Clean, dry, & intact 1/2/2018  1:27 PM   Phlebitis Assessment 0 1/2/2018  1:27 PM   Infiltration Assessment 0 1/2/2018  1:27 PM   Dressing Status Clean, dry, & intact 1/2/2018  1:27 PM   Dressing Type Transparent 1/2/2018  1:27 PM   Hub Color/Line Status Pink 1/2/2018  1:27 PM   Action Taken Blood drawn 1/2/2018  1:27 PM        Opportunity for questions and clarification was provided.       Patient transported with:   Monitor  Registered Nurse

## 2018-01-03 ENCOUNTER — APPOINTMENT (OUTPATIENT)
Dept: CT IMAGING | Age: 68
DRG: 853 | End: 2018-01-03
Attending: SPECIALIST
Payer: MEDICARE

## 2018-01-03 ENCOUNTER — APPOINTMENT (OUTPATIENT)
Dept: GENERAL RADIOLOGY | Age: 68
DRG: 853 | End: 2018-01-03
Attending: INTERNAL MEDICINE
Payer: MEDICARE

## 2018-01-03 ENCOUNTER — ANESTHESIA (OUTPATIENT)
Dept: SURGERY | Age: 68
DRG: 853 | End: 2018-01-03
Payer: MEDICARE

## 2018-01-03 ENCOUNTER — ANESTHESIA EVENT (OUTPATIENT)
Dept: SURGERY | Age: 68
DRG: 853 | End: 2018-01-03
Payer: MEDICARE

## 2018-01-03 LAB
ALBUMIN SERPL-MCNC: 2.4 G/DL (ref 3.5–5)
ALBUMIN/GLOB SERPL: 0.6 {RATIO} (ref 1.1–2.2)
ALP SERPL-CCNC: 104 U/L (ref 45–117)
ALT SERPL-CCNC: 28 U/L (ref 12–78)
ANION GAP SERPL CALC-SCNC: 9 MMOL/L (ref 5–15)
AST SERPL-CCNC: 21 U/L (ref 15–37)
BASOPHILS # BLD: 0 K/UL (ref 0–0.1)
BASOPHILS NFR BLD: 0 % (ref 0–1)
BILIRUB SERPL-MCNC: 1.5 MG/DL (ref 0.2–1)
BUN SERPL-MCNC: 23 MG/DL (ref 6–20)
BUN/CREAT SERPL: 19 (ref 12–20)
CALCIUM SERPL-MCNC: 8.8 MG/DL (ref 8.5–10.1)
CHLORIDE SERPL-SCNC: 109 MMOL/L (ref 97–108)
CO2 SERPL-SCNC: 23 MMOL/L (ref 21–32)
CREAT SERPL-MCNC: 1.18 MG/DL (ref 0.7–1.3)
DIFFERENTIAL METHOD BLD: ABNORMAL
EOSINOPHIL # BLD: 0 K/UL (ref 0–0.4)
EOSINOPHIL NFR BLD: 0 % (ref 0–7)
ERYTHROCYTE [DISTWIDTH] IN BLOOD BY AUTOMATED COUNT: 12.7 % (ref 11.5–14.5)
GLOBULIN SER CALC-MCNC: 4.2 G/DL (ref 2–4)
GLUCOSE SERPL-MCNC: 129 MG/DL (ref 65–100)
HCT VFR BLD AUTO: 35.9 % (ref 36.6–50.3)
HGB BLD-MCNC: 12.1 G/DL (ref 12.1–17)
LACTATE SERPL-SCNC: 1.9 MMOL/L (ref 0.4–2)
LYMPHOCYTES # BLD: 0.2 K/UL (ref 0.8–3.5)
LYMPHOCYTES NFR BLD: 1 % (ref 12–49)
MCH RBC QN AUTO: 29.8 PG (ref 26–34)
MCHC RBC AUTO-ENTMCNC: 33.7 G/DL (ref 30–36.5)
MCV RBC AUTO: 88.4 FL (ref 80–99)
MONOCYTES # BLD: 0.7 K/UL (ref 0–1)
MONOCYTES NFR BLD: 3 % (ref 5–13)
NEUTS BAND NFR BLD MANUAL: 2 % (ref 0–6)
NEUTS SEG # BLD: 22.4 K/UL (ref 1.8–8)
NEUTS SEG NFR BLD: 94 % (ref 32–75)
PLATELET # BLD AUTO: 279 K/UL (ref 150–400)
POTASSIUM SERPL-SCNC: 4 MMOL/L (ref 3.5–5.1)
PROT SERPL-MCNC: 6.6 G/DL (ref 6.4–8.2)
RBC # BLD AUTO: 4.06 M/UL (ref 4.1–5.7)
RBC MORPH BLD: ABNORMAL
SODIUM SERPL-SCNC: 141 MMOL/L (ref 136–145)
WBC # BLD AUTO: 23.3 K/UL (ref 4.1–11.1)

## 2018-01-03 PROCEDURE — 74011000250 HC RX REV CODE- 250: Performed by: SPECIALIST

## 2018-01-03 PROCEDURE — 80053 COMPREHEN METABOLIC PANEL: CPT | Performed by: INTERNAL MEDICINE

## 2018-01-03 PROCEDURE — 77030010358 HC TU EAR MEDT -A: Performed by: SPECIALIST

## 2018-01-03 PROCEDURE — 74011250636 HC RX REV CODE- 250/636

## 2018-01-03 PROCEDURE — 83605 ASSAY OF LACTIC ACID: CPT | Performed by: INTERNAL MEDICINE

## 2018-01-03 PROCEDURE — 87205 SMEAR GRAM STAIN: CPT | Performed by: HOSPITALIST

## 2018-01-03 PROCEDURE — 099500Z DRAINAGE OF RIGHT MIDDLE EAR WITH DRAINAGE DEVICE, OPEN APPROACH: ICD-10-PCS | Performed by: SPECIALIST

## 2018-01-03 PROCEDURE — 77030011267 HC ELECTRD BLD COVD -A: Performed by: SPECIALIST

## 2018-01-03 PROCEDURE — 77030032490 HC SLV COMPR SCD KNE COVD -B: Performed by: SPECIALIST

## 2018-01-03 PROCEDURE — 77030010507 HC ADH SKN DERMBND J&J -B: Performed by: SPECIALIST

## 2018-01-03 PROCEDURE — 94640 AIRWAY INHALATION TREATMENT: CPT

## 2018-01-03 PROCEDURE — 77030006932 HC BLD TYMP BVR BD -B: Performed by: SPECIALIST

## 2018-01-03 PROCEDURE — 77030018846 HC SOL IRR STRL H20 ICUM -A: Performed by: SPECIALIST

## 2018-01-03 PROCEDURE — 36415 COLL VENOUS BLD VENIPUNCTURE: CPT | Performed by: INTERNAL MEDICINE

## 2018-01-03 PROCEDURE — 77030008570 HC TBNG SUC IRR GRAC -B: Performed by: SPECIALIST

## 2018-01-03 PROCEDURE — 77030008684 HC TU ET CUF COVD -B: Performed by: ANESTHESIOLOGY

## 2018-01-03 PROCEDURE — 76010000149 HC OR TIME 1 TO 1.5 HR: Performed by: SPECIALIST

## 2018-01-03 PROCEDURE — 74011250636 HC RX REV CODE- 250/636: Performed by: INTERNAL MEDICINE

## 2018-01-03 PROCEDURE — 77030011640 HC PAD GRND REM COVD -A: Performed by: SPECIALIST

## 2018-01-03 PROCEDURE — 87102 FUNGUS ISOLATION CULTURE: CPT | Performed by: HOSPITALIST

## 2018-01-03 PROCEDURE — 74011000250 HC RX REV CODE- 250: Performed by: INTERNAL MEDICINE

## 2018-01-03 PROCEDURE — 77030029099 HC BN WAX SSPC -A: Performed by: SPECIALIST

## 2018-01-03 PROCEDURE — 77030018836 HC SOL IRR NACL ICUM -A: Performed by: SPECIALIST

## 2018-01-03 PROCEDURE — 65270000029 HC RM PRIVATE

## 2018-01-03 PROCEDURE — 4A11X4G MONITORING OF PERIPHERAL NERVOUS ELECTRICAL ACTIVITY, INTRAOPERATIVE, EXTERNAL APPROACH: ICD-10-PCS | Performed by: SPECIALIST

## 2018-01-03 PROCEDURE — 77030019615 HC ELCTRD EMG NDL MEDT -B: Performed by: SPECIALIST

## 2018-01-03 PROCEDURE — 74011636320 HC RX REV CODE- 636/320: Performed by: INTERNAL MEDICINE

## 2018-01-03 PROCEDURE — 74011250637 HC RX REV CODE- 250/637: Performed by: SPECIALIST

## 2018-01-03 PROCEDURE — 77030004435 HC BUR RND STRY -C: Performed by: SPECIALIST

## 2018-01-03 PROCEDURE — 74011000272 HC RX REV CODE- 272: Performed by: SPECIALIST

## 2018-01-03 PROCEDURE — 76210000017 HC OR PH I REC 1.5 TO 2 HR: Performed by: SPECIALIST

## 2018-01-03 PROCEDURE — 74011000258 HC RX REV CODE- 258: Performed by: INTERNAL MEDICINE

## 2018-01-03 PROCEDURE — 85025 COMPLETE CBC W/AUTO DIFF WBC: CPT | Performed by: INTERNAL MEDICINE

## 2018-01-03 PROCEDURE — 71045 X-RAY EXAM CHEST 1 VIEW: CPT

## 2018-01-03 PROCEDURE — 09BB0ZZ EXCISION OF RIGHT MASTOID SINUS, OPEN APPROACH: ICD-10-PCS | Performed by: SPECIALIST

## 2018-01-03 PROCEDURE — 74011000250 HC RX REV CODE- 250

## 2018-01-03 PROCEDURE — 77030031139 HC SUT VCRL2 J&J -A: Performed by: SPECIALIST

## 2018-01-03 PROCEDURE — 70487 CT MAXILLOFACIAL W/DYE: CPT

## 2018-01-03 PROCEDURE — 77030006689 HC BLD OPHTH BVR BD -A: Performed by: SPECIALIST

## 2018-01-03 PROCEDURE — 76060000033 HC ANESTHESIA 1 TO 1.5 HR: Performed by: SPECIALIST

## 2018-01-03 PROCEDURE — 74011250637 HC RX REV CODE- 250/637: Performed by: INTERNAL MEDICINE

## 2018-01-03 DEVICE — VENT TUBE 9116090 5PK NEWBILL T TUBE: Type: IMPLANTABLE DEVICE | Site: EAR | Status: FUNCTIONAL

## 2018-01-03 RX ORDER — LIDOCAINE HYDROCHLORIDE 20 MG/ML
INJECTION, SOLUTION EPIDURAL; INFILTRATION; INTRACAUDAL; PERINEURAL AS NEEDED
Status: DISCONTINUED | OUTPATIENT
Start: 2018-01-03 | End: 2018-01-03 | Stop reason: HOSPADM

## 2018-01-03 RX ORDER — TAMSULOSIN HYDROCHLORIDE 0.4 MG/1
0.4 CAPSULE ORAL DAILY
COMMUNITY

## 2018-01-03 RX ORDER — FENTANYL CITRATE 50 UG/ML
INJECTION, SOLUTION INTRAMUSCULAR; INTRAVENOUS AS NEEDED
Status: DISCONTINUED | OUTPATIENT
Start: 2018-01-03 | End: 2018-01-03 | Stop reason: HOSPADM

## 2018-01-03 RX ORDER — SODIUM CHLORIDE, SODIUM LACTATE, POTASSIUM CHLORIDE, CALCIUM CHLORIDE 600; 310; 30; 20 MG/100ML; MG/100ML; MG/100ML; MG/100ML
100 INJECTION, SOLUTION INTRAVENOUS CONTINUOUS
Status: DISCONTINUED | OUTPATIENT
Start: 2018-01-03 | End: 2018-01-04 | Stop reason: HOSPADM

## 2018-01-03 RX ORDER — CIPROFLOXACIN AND DEXAMETHASONE 3; 1 MG/ML; MG/ML
4 SUSPENSION/ DROPS AURICULAR (OTIC) 2 TIMES DAILY
Status: DISCONTINUED | OUTPATIENT
Start: 2018-01-03 | End: 2018-01-03 | Stop reason: HOSPADM

## 2018-01-03 RX ORDER — PHENYLEPHRINE HCL IN 0.9% NACL 0.4MG/10ML
SYRINGE (ML) INTRAVENOUS AS NEEDED
Status: DISCONTINUED | OUTPATIENT
Start: 2018-01-03 | End: 2018-01-03 | Stop reason: HOSPADM

## 2018-01-03 RX ORDER — BACITRACIN ZINC 500 UNIT/G
OINTMENT (GRAM) TOPICAL AS NEEDED
Status: DISCONTINUED | OUTPATIENT
Start: 2018-01-03 | End: 2018-01-03 | Stop reason: HOSPADM

## 2018-01-03 RX ORDER — SODIUM CHLORIDE 9 MG/ML
25 INJECTION, SOLUTION INTRAVENOUS CONTINUOUS
Status: DISCONTINUED | OUTPATIENT
Start: 2018-01-03 | End: 2018-01-04 | Stop reason: HOSPADM

## 2018-01-03 RX ORDER — FENTANYL CITRATE 50 UG/ML
25 INJECTION, SOLUTION INTRAMUSCULAR; INTRAVENOUS
Status: DISCONTINUED | OUTPATIENT
Start: 2018-01-03 | End: 2018-01-03 | Stop reason: HOSPADM

## 2018-01-03 RX ORDER — FLUTICASONE PROPIONATE 50 MCG
2 SPRAY, SUSPENSION (ML) NASAL DAILY
COMMUNITY

## 2018-01-03 RX ORDER — SODIUM CHLORIDE 0.9 % (FLUSH) 0.9 %
5-10 SYRINGE (ML) INJECTION AS NEEDED
Status: DISCONTINUED | OUTPATIENT
Start: 2018-01-03 | End: 2018-01-03 | Stop reason: HOSPADM

## 2018-01-03 RX ORDER — IRBESARTAN 150 MG/1
150 TABLET ORAL
COMMUNITY

## 2018-01-03 RX ORDER — EPINEPHRINE NASAL SOLUTION 1 MG/ML
SOLUTION NASAL AS NEEDED
Status: DISCONTINUED | OUTPATIENT
Start: 2018-01-03 | End: 2018-01-03 | Stop reason: HOSPADM

## 2018-01-03 RX ORDER — SODIUM CHLORIDE 0.9 % (FLUSH) 0.9 %
10 SYRINGE (ML) INJECTION
Status: COMPLETED | OUTPATIENT
Start: 2018-01-03 | End: 2018-01-03

## 2018-01-03 RX ORDER — SODIUM CHLORIDE, SODIUM LACTATE, POTASSIUM CHLORIDE, CALCIUM CHLORIDE 600; 310; 30; 20 MG/100ML; MG/100ML; MG/100ML; MG/100ML
100 INJECTION, SOLUTION INTRAVENOUS CONTINUOUS
Status: DISCONTINUED | OUTPATIENT
Start: 2018-01-03 | End: 2018-01-03 | Stop reason: HOSPADM

## 2018-01-03 RX ORDER — FENTANYL CITRATE 50 UG/ML
50 INJECTION, SOLUTION INTRAMUSCULAR; INTRAVENOUS AS NEEDED
Status: DISCONTINUED | OUTPATIENT
Start: 2018-01-03 | End: 2018-01-04 | Stop reason: HOSPADM

## 2018-01-03 RX ORDER — DIPHENHYDRAMINE HYDROCHLORIDE 50 MG/ML
12.5 INJECTION, SOLUTION INTRAMUSCULAR; INTRAVENOUS AS NEEDED
Status: DISCONTINUED | OUTPATIENT
Start: 2018-01-03 | End: 2018-01-03 | Stop reason: HOSPADM

## 2018-01-03 RX ORDER — SODIUM CHLORIDE, SODIUM LACTATE, POTASSIUM CHLORIDE, CALCIUM CHLORIDE 600; 310; 30; 20 MG/100ML; MG/100ML; MG/100ML; MG/100ML
INJECTION, SOLUTION INTRAVENOUS
Status: DISCONTINUED | OUTPATIENT
Start: 2018-01-03 | End: 2018-01-03 | Stop reason: HOSPADM

## 2018-01-03 RX ORDER — HYDROCODONE POLISTIREX AND CHLORPHENIRAMINE POLISTIREX 10; 8 MG/5ML; MG/5ML
5 SUSPENSION, EXTENDED RELEASE ORAL
COMMUNITY
End: 2018-01-09

## 2018-01-03 RX ORDER — SODIUM CHLORIDE 0.9 % (FLUSH) 0.9 %
5-10 SYRINGE (ML) INJECTION AS NEEDED
Status: DISCONTINUED | OUTPATIENT
Start: 2018-01-03 | End: 2018-01-05

## 2018-01-03 RX ORDER — ONDANSETRON 2 MG/ML
INJECTION INTRAMUSCULAR; INTRAVENOUS AS NEEDED
Status: DISCONTINUED | OUTPATIENT
Start: 2018-01-03 | End: 2018-01-03 | Stop reason: HOSPADM

## 2018-01-03 RX ORDER — OXYMETAZOLINE HCL 0.05 %
2 SPRAY, NON-AEROSOL (ML) NASAL
COMMUNITY

## 2018-01-03 RX ORDER — SODIUM CHLORIDE 0.9 % (FLUSH) 0.9 %
5-10 SYRINGE (ML) INJECTION EVERY 8 HOURS
Status: DISCONTINUED | OUTPATIENT
Start: 2018-01-03 | End: 2018-01-05

## 2018-01-03 RX ORDER — ROCURONIUM BROMIDE 10 MG/ML
INJECTION, SOLUTION INTRAVENOUS AS NEEDED
Status: DISCONTINUED | OUTPATIENT
Start: 2018-01-03 | End: 2018-01-03 | Stop reason: HOSPADM

## 2018-01-03 RX ORDER — SUCCINYLCHOLINE CHLORIDE 20 MG/ML
INJECTION INTRAMUSCULAR; INTRAVENOUS AS NEEDED
Status: DISCONTINUED | OUTPATIENT
Start: 2018-01-03 | End: 2018-01-03 | Stop reason: HOSPADM

## 2018-01-03 RX ORDER — LIDOCAINE HYDROCHLORIDE 10 MG/ML
0.1 INJECTION, SOLUTION EPIDURAL; INFILTRATION; INTRACAUDAL; PERINEURAL AS NEEDED
Status: DISCONTINUED | OUTPATIENT
Start: 2018-01-03 | End: 2018-01-04 | Stop reason: HOSPADM

## 2018-01-03 RX ORDER — PROPOFOL 10 MG/ML
INJECTION, EMULSION INTRAVENOUS AS NEEDED
Status: DISCONTINUED | OUTPATIENT
Start: 2018-01-03 | End: 2018-01-03 | Stop reason: HOSPADM

## 2018-01-03 RX ORDER — MIDAZOLAM HYDROCHLORIDE 1 MG/ML
INJECTION, SOLUTION INTRAMUSCULAR; INTRAVENOUS AS NEEDED
Status: DISCONTINUED | OUTPATIENT
Start: 2018-01-03 | End: 2018-01-03 | Stop reason: HOSPADM

## 2018-01-03 RX ORDER — AZITHROMYCIN 250 MG/1
250 TABLET, FILM COATED ORAL DAILY
COMMUNITY
End: 2018-01-09

## 2018-01-03 RX ORDER — VANCOMYCIN/0.9 % SOD CHLORIDE 1.5G/250ML
1500 PLASTIC BAG, INJECTION (ML) INTRAVENOUS
Status: COMPLETED | OUTPATIENT
Start: 2018-01-03 | End: 2018-01-06

## 2018-01-03 RX ORDER — LIDOCAINE HYDROCHLORIDE AND EPINEPHRINE 10; 10 MG/ML; UG/ML
INJECTION, SOLUTION INFILTRATION; PERINEURAL AS NEEDED
Status: DISCONTINUED | OUTPATIENT
Start: 2018-01-03 | End: 2018-01-03 | Stop reason: HOSPADM

## 2018-01-03 RX ORDER — ONDANSETRON 2 MG/ML
4 INJECTION INTRAMUSCULAR; INTRAVENOUS AS NEEDED
Status: DISCONTINUED | OUTPATIENT
Start: 2018-01-03 | End: 2018-01-03 | Stop reason: HOSPADM

## 2018-01-03 RX ORDER — MORPHINE SULFATE 10 MG/ML
2 INJECTION, SOLUTION INTRAMUSCULAR; INTRAVENOUS
Status: DISCONTINUED | OUTPATIENT
Start: 2018-01-03 | End: 2018-01-03 | Stop reason: HOSPADM

## 2018-01-03 RX ORDER — PHENOL/SODIUM PHENOLATE
20 AEROSOL, SPRAY (ML) MUCOUS MEMBRANE DAILY
COMMUNITY

## 2018-01-03 RX ORDER — DEXAMETHASONE SODIUM PHOSPHATE 4 MG/ML
8 INJECTION, SOLUTION INTRA-ARTICULAR; INTRALESIONAL; INTRAMUSCULAR; INTRAVENOUS; SOFT TISSUE EVERY 6 HOURS
Status: DISCONTINUED | OUTPATIENT
Start: 2018-01-03 | End: 2018-01-05

## 2018-01-03 RX ORDER — ROPIVACAINE HYDROCHLORIDE 5 MG/ML
30 INJECTION, SOLUTION EPIDURAL; INFILTRATION; PERINEURAL AS NEEDED
Status: DISCONTINUED | OUTPATIENT
Start: 2018-01-03 | End: 2018-01-04 | Stop reason: HOSPADM

## 2018-01-03 RX ORDER — MIDAZOLAM HYDROCHLORIDE 1 MG/ML
0.5 INJECTION, SOLUTION INTRAMUSCULAR; INTRAVENOUS
Status: DISCONTINUED | OUTPATIENT
Start: 2018-01-03 | End: 2018-01-03 | Stop reason: HOSPADM

## 2018-01-03 RX ORDER — MIDAZOLAM HYDROCHLORIDE 1 MG/ML
1 INJECTION, SOLUTION INTRAMUSCULAR; INTRAVENOUS AS NEEDED
Status: DISCONTINUED | OUTPATIENT
Start: 2018-01-03 | End: 2018-01-04 | Stop reason: HOSPADM

## 2018-01-03 RX ORDER — CIPROFLOXACIN AND DEXAMETHASONE 3; 1 MG/ML; MG/ML
SUSPENSION/ DROPS AURICULAR (OTIC) AS NEEDED
Status: DISCONTINUED | OUTPATIENT
Start: 2018-01-03 | End: 2018-01-03 | Stop reason: HOSPADM

## 2018-01-03 RX ADMIN — FENTANYL CITRATE 150 MCG: 50 INJECTION, SOLUTION INTRAMUSCULAR; INTRAVENOUS at 19:08

## 2018-01-03 RX ADMIN — HYDROMORPHONE HYDROCHLORIDE 0.5 MG: 2 INJECTION, SOLUTION INTRAMUSCULAR; INTRAVENOUS; SUBCUTANEOUS at 06:34

## 2018-01-03 RX ADMIN — AMPICILLIN SODIUM 2 G: 2 INJECTION, POWDER, FOR SOLUTION INTRAVENOUS at 01:14

## 2018-01-03 RX ADMIN — Medication 10 ML: at 14:40

## 2018-01-03 RX ADMIN — IOPAMIDOL 100 ML: 612 INJECTION, SOLUTION INTRAVENOUS at 08:22

## 2018-01-03 RX ADMIN — DEXAMETHASONE SODIUM PHOSPHATE 12.5 MG: 4 INJECTION, SOLUTION INTRAMUSCULAR; INTRAVENOUS at 12:18

## 2018-01-03 RX ADMIN — AMPICILLIN SODIUM 2 G: 2 INJECTION, POWDER, FOR SOLUTION INTRAVENOUS at 14:40

## 2018-01-03 RX ADMIN — Medication 10 ML: at 08:31

## 2018-01-03 RX ADMIN — AMPICILLIN SODIUM 2 G: 2 INJECTION, POWDER, FOR SOLUTION INTRAVENOUS at 06:14

## 2018-01-03 RX ADMIN — Medication 10 ML: at 06:14

## 2018-01-03 RX ADMIN — HEPARIN SODIUM 5000 UNITS: 5000 INJECTION, SOLUTION INTRAVENOUS; SUBCUTANEOUS at 14:39

## 2018-01-03 RX ADMIN — PROPOFOL 100 MG: 10 INJECTION, EMULSION INTRAVENOUS at 19:40

## 2018-01-03 RX ADMIN — SODIUM CHLORIDE 100 ML: 900 INJECTION, SOLUTION INTRAVENOUS at 08:23

## 2018-01-03 RX ADMIN — MIDAZOLAM HYDROCHLORIDE 2 MG: 1 INJECTION, SOLUTION INTRAMUSCULAR; INTRAVENOUS at 19:00

## 2018-01-03 RX ADMIN — SUCCINYLCHOLINE CHLORIDE 180 MG: 20 INJECTION INTRAMUSCULAR; INTRAVENOUS at 19:08

## 2018-01-03 RX ADMIN — SODIUM CHLORIDE 125 ML/HR: 900 INJECTION, SOLUTION INTRAVENOUS at 13:45

## 2018-01-03 RX ADMIN — FENTANYL CITRATE 150 MCG: 50 INJECTION, SOLUTION INTRAMUSCULAR; INTRAVENOUS at 19:42

## 2018-01-03 RX ADMIN — HEPARIN SODIUM 5000 UNITS: 5000 INJECTION, SOLUTION INTRAVENOUS; SUBCUTANEOUS at 23:37

## 2018-01-03 RX ADMIN — Medication 80 MCG: at 19:50

## 2018-01-03 RX ADMIN — VANCOMYCIN HYDROCHLORIDE 1500 MG: 10 INJECTION, POWDER, LYOPHILIZED, FOR SOLUTION INTRAVENOUS at 15:17

## 2018-01-03 RX ADMIN — LIDOCAINE HYDROCHLORIDE 80 MG: 20 INJECTION, SOLUTION EPIDURAL; INFILTRATION; INTRACAUDAL; PERINEURAL at 19:07

## 2018-01-03 RX ADMIN — Medication 10 ML: at 08:23

## 2018-01-03 RX ADMIN — FENTANYL CITRATE 50 MCG: 50 INJECTION, SOLUTION INTRAMUSCULAR; INTRAVENOUS at 20:05

## 2018-01-03 RX ADMIN — DEXAMETHASONE SODIUM PHOSPHATE 12.5 MG: 4 INJECTION, SOLUTION INTRAMUSCULAR; INTRAVENOUS at 00:00

## 2018-01-03 RX ADMIN — ROCURONIUM BROMIDE 5 MG: 10 INJECTION, SOLUTION INTRAVENOUS at 19:07

## 2018-01-03 RX ADMIN — HYDROMORPHONE HYDROCHLORIDE 0.5 MG: 2 INJECTION, SOLUTION INTRAMUSCULAR; INTRAVENOUS; SUBCUTANEOUS at 00:30

## 2018-01-03 RX ADMIN — SODIUM CHLORIDE, SODIUM LACTATE, POTASSIUM CHLORIDE, CALCIUM CHLORIDE: 600; 310; 30; 20 INJECTION, SOLUTION INTRAVENOUS at 19:00

## 2018-01-03 RX ADMIN — DEXAMETHASONE SODIUM PHOSPHATE 12.5 MG: 4 INJECTION, SOLUTION INTRAMUSCULAR; INTRAVENOUS at 08:32

## 2018-01-03 RX ADMIN — Medication 10 ML: at 23:38

## 2018-01-03 RX ADMIN — AMPICILLIN SODIUM 2 G: 2 INJECTION, POWDER, FOR SOLUTION INTRAVENOUS at 09:55

## 2018-01-03 RX ADMIN — HYDROCODONE BITARTRATE AND ACETAMINOPHEN 1 TABLET: 5; 325 TABLET ORAL at 14:44

## 2018-01-03 RX ADMIN — CEFTRIAXONE 2 G: 2 INJECTION, POWDER, FOR SOLUTION INTRAMUSCULAR; INTRAVENOUS at 09:01

## 2018-01-03 RX ADMIN — HEPARIN SODIUM 5000 UNITS: 5000 INJECTION, SOLUTION INTRAVENOUS; SUBCUTANEOUS at 06:14

## 2018-01-03 RX ADMIN — ONDANSETRON 8 MG: 2 INJECTION INTRAMUSCULAR; INTRAVENOUS at 20:02

## 2018-01-03 RX ADMIN — ALBUTEROL SULFATE 1.25 MG: 2.5 SOLUTION RESPIRATORY (INHALATION) at 02:00

## 2018-01-03 RX ADMIN — Medication 10 ML: at 05:58

## 2018-01-03 RX ADMIN — FENTANYL CITRATE 100 MCG: 50 INJECTION, SOLUTION INTRAMUSCULAR; INTRAVENOUS at 19:07

## 2018-01-03 RX ADMIN — PROPOFOL 200 MG: 10 INJECTION, EMULSION INTRAVENOUS at 19:07

## 2018-01-03 NOTE — ED NOTES
The documentation for this period is being entered following the guidelines as defined in the [facility] policy by Arielle Guillen RN.

## 2018-01-03 NOTE — PROGRESS NOTES
TRANSFER - OUT REPORT:    Verbal report given to Prashanth Denis RN (name) on [facility]  being transferred to OR (unit) for ordered procedure       Report consisted of patients Situation, Background, Assessment and   Recommendations(SBAR). Information from the following report(s) SBAR, Kardex, Intake/Output, MAR and Recent Results was reviewed with the receiving nurse. Lines:   Peripheral IV 01/02/18 Left Antecubital (Active)   Site Assessment Clean, dry, & intact 1/3/2018  8:29 AM   Phlebitis Assessment 0 1/3/2018  8:29 AM   Infiltration Assessment 0 1/3/2018  8:29 AM   Dressing Status Clean, dry, & intact 1/3/2018  8:29 AM   Dressing Type Transparent 1/3/2018  8:29 AM   Hub Color/Line Status Pink;Flushed 1/3/2018  8:29 AM   Action Taken Open ports on tubing capped 1/3/2018  8:29 AM   Alcohol Cap Used Yes 1/3/2018  8:29 AM       Peripheral IV 01/03/18 Left Antecubital (Active)   Site Assessment Clean, dry, & intact 1/3/2018  8:31 AM   Phlebitis Assessment 0 1/3/2018  8:31 AM   Infiltration Assessment 0 1/3/2018  8:31 AM   Dressing Status Clean, dry, & intact 1/3/2018  8:31 AM   Dressing Type Transparent 1/3/2018  8:31 AM   Hub Color/Line Status Yellow; Infusing;Flushed 1/3/2018  8:31 AM   Action Taken Open ports on tubing capped 1/3/2018  8:31 AM   Alcohol Cap Used Yes 1/3/2018  8:31 AM        Opportunity for questions and clarification was provided.       Patient transported with:   [employer]

## 2018-01-03 NOTE — PROGRESS NOTES
The documentation for this period is being entered following the guidelines as defined in the [facility] downtime policy by Ramesh Doss. Bedside and Verbal shift change report given to Rosalina Rodas (oncoming nurse) by Marcelino Del Real (offgoing nurse). Report included the following information SBAR, Kardex and MAR.     7031: Attempts have been made to reach hospitalist regarding the patients carpenter cath. Patient has carpenter in placed but they're no orders in place. Attempts will be continued to be made as the lines are still down. 46: Spoke to Ronda Saleh. I advised him that the patient had the carpenter when transferred to our unit. However, there are no orders. I advised him that the patient didn't benefit from having the carpenter. Order was given to remove it.     8984: Spoke to DR Chad Ortega he wanted to know why the CT was not done. I called CT spoke to Krysta Gan she advised me that because of the unscheduled downtime last night they could not do CT. She advised me that she would speak to the morning shift and fit the CT in the AM. I advised her it was an urgent matter per MD Chad Ortega. Will advise morning RN.

## 2018-01-03 NOTE — PROGRESS NOTES
Pharmacist Note - Vancomycin Dosing    Consult provided for this 75 y.o. male for indication of CNS infection, meningitis -?strep pneumo?  -Likely secondary to the otomastoiditis  -also RLL PNA    Antibiotic regimen(s): Vanc + ampicillin + ceftriaxone    Recent Labs      18   1132   WBC  20.9*   CREA  1.30   BUN  18     Frequency of BMP: daily  Height: 175.3 cm  Weight: 89.4 kg  Est CrCl: 60 ml/min; UO: n/a ml/kg/hr  Temp (24hrs), Av.1 °F (37.8 °C), Min:99 °F (37.2 °C), Max:101.3 °F (38.5 °C)    Cultures: also drawn at OSH  1/2 blood - pending    Goal trough = 15 - 20 mcg/mL    -2000 mg vancomycin IV documented as given at OSH 1/2 @ 0702    Will order a maintenance dose of 1250 mg IV every 16 hours. Pharmacy to follow patient daily and order levels / make dose adjustments as appropriate.

## 2018-01-03 NOTE — PROGRESS NOTES
Amy Khalil is a 75 y.o. with a history of vertigo/meniere's disease GERD and hypertension who was transferred from [facility]. He presented to the outside ER due to a change in mental status. Per records he had been sick for six days with vomiting, headache, diarrhea and altered mental status. He had been seen by a physician and started on an antibiotic for bronchitis. Per chart review workup included CXR, CT head, lumbar puncture, blood cultures and urine culture. Outside [place] [address], Lactic acid of 2.4,   LP demonstrated 2300 white cells with 94% neutrophils,  CSF glucose < 10.  no RBC an total protein >300. GS CSF had many WBC's but no organisms. Phuc Dejesus He was treated with Zosyn 3.375g last night. Has received 2g of Ceftriaxone and 2g of Vancomycin this morning. He was transferred to [facility] for higher level of care. Spoke to patient 's wife - he has been feeling unwell since before [place] for the past 10 days. 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 HE started with cough and then had headache - He went to [facility] over the weekend and was given zpak and prednisone. He started developing fever the next day and was c/o headache, nausea and vomiting HE was also dizzy. He was restless the whole night and on Monday as his temp was 104 and he was taken to [facility] on 1/1/18. CT head with out contrast showed opacity involving portion of rt mastoid and middle ear cavity questioning otomastoiditis  He got a dose of zosyn and then had LP which was as above and then given ceftriaxone/vanco and transferred                 Data  this hospitalization  Date  TMAX WBC Abn labs Cultures ABX   1/2/18 101.3 20.9  Cr 1.30  T. bilirubin - 1.9, Glucose 140 Blood cultures,   Respiratory culture- pending Ctx  Ampicillin  vanco    1/3/18    23. 3                                                                                                          Objective: VITALS:     Visit Vitals    /60 (BP 1 Location: Left arm, BP Patient Position: At rest)    Pulse 79    Temp 97.7 °F (36.5 °C)    Resp 20    Ht 5' 9\" (1.753 m)    Wt 197 lb 1.5 oz (89.4 kg)    SpO2 96%    BMI 29.11 kg/m2     PHYSICAL EXAM:   General:   lethargic but responds to simple commands- recognized his wife  Head:                                   Normocephalic, without obvious abnormality, atraumatic. Eyes:                                   Conjunctivae clear, anicteric sclerae. Pupils are equal  Nose:                                   Nares normal. No drainage or sinus tenderness. Oral cavity- cannot be seen                Neck stiffness      Back:                                   Small blanching erythemaotus spots on the back- 6-7  Lungs:                       B/l air entry  Heart:                                  s1s2 tachycardia  Abdomen:                  Soft, non-tender,not distended. Bowel sounds normal. No masses  Extremities:               Extremities normal, atraumatic, no cyanosis. No edema. No clubbing  Skin:                                    No rashes or lesions. Not Jaundiced  Lymph:                      Cervical, supraclavicular normal.  Neurologic:                Grossly non-focal   moves all limbs    Pertinent Labs   Wbc 20.9  Cr 1.30  LP 2500 WBC ( 96% N_  Microbiology  BC from OSH-   gram positive cocci in pairs and chains  csf     IMAGING RESULTS:     CT head  Chronic ethmoid sinusitis. Partial opacification of right mastoid air cell, question otomastoiditis.                Impression/Recommendation  74 yo male with history of hypertension and GERD who is admitted with altered mental status,headache    fever, high wbc, csf neutrophilic pleocytosis        Bacterial Meningitis -   Likely secondary to the otomastoiditis- Preliminary blood culture 4 out of 4 bottles ( from [address] [facility])  is alpha hemolytic strep ( alpha strep is not strep pneumo)  Csf culture from 1/2/18 is negative so far  Continue vanco/ceftriaxone and ampicillin    Continue all the current meds until csf culture is finalized- if neg for pneumococcus by tomorrow  will Gatito londono   Appreciate ENT recommendations- will be taken for draining middle ear /mastoid today   Cultures to be sent from the drainage     Rt  Pneumonia -      HTN- management as per primary team     Discussed with patient's wife, friends  and Rachel Galindo

## 2018-01-03 NOTE — PROGRESS NOTES
TRANSFER - IN REPORT:    Verbal report received from [facility](name) on [facility]  being received from 6E(unit) for ordered procedure      Report consisted of patients Situation, Background, Assessment and   Recommendations(SBAR). Information from the following report(s) SBAR, Kardex, Intake/Output, MAR and Recent Results was reviewed with the receiving nurse. Opportunity for questions and clarification was provided. Assessment completed upon patients arrival to unit and care assumed.

## 2018-01-03 NOTE — PROGRESS NOTES
Bedside and Verbal shift change report given to Homero Cota RN (oncoming nurse) by Evelyn Jean RN (offgoing nurse). Report included the following information SBAR, Kardex, MAR and Recent Results.

## 2018-01-03 NOTE — PROGRESS NOTES
CT scan is still pending   System was down last night causing delay  Spoke to floor Regis this morning and asked to call radiology for CT scan with contrast as ordered

## 2018-01-03 NOTE — PROGRESS NOTES
Physical Therapy Screening:    An Loc[facility] screening referral was triggered for physical therapy based on results obtained during the nursing admission assessment. The patients chart was reviewed and the patient is appropriate for a skilled therapy evaluation if there is a decline in functional mobility from baseline and more clear and able to participate following directions. .  Please order a consult for physical therapy if you are in agreement and would like an evaluation to be completed. Thank you.     Silvia Murguia, PT

## 2018-01-03 NOTE — PROGRESS NOTES
Pharmacy Consult-Vancomycin Dosing  Day #2 of Vancomycin  Indication:  Bacterial Meningitis likely 2/2 otomastoiditis, RLL PNA  Current regimen:  1250 mg IV every 16 hours  Note: 2000 mg vancomycin x1 documented as given at OSH  @ 0702    Abx regimen:   Vancomycin, ampicillins, ceftriaxone  ID Following ?: YES  Frequency of BMP?: daily through     Recent Labs      18   0230  18   1132   WBC  23.3*  20.9*   CREA  1.18  1.30   BUN  23*  18     Est CrCl: 66 ml/min; UO:n/m  Temp (24hrs), Av.6 °F (37.6 °C), Min:98.4 °F (36.9 °C), Max:101.3 °F (38.5 °C)    Cultures:    blood - pending  OSH LP demonstrated 2300 white cells with 94% neutrophils,  CSF glucose < 10; no RBC and total protein >300. OSH GS CSF had many WBC's but no organisms. Goal trough = 15 - 20 mcg/mL    Recent trough history (date/time/level/dose/action taken):  none    Plan: Change to 1500 mg (~17mg/kg) q16h for improvement in CrCl.  Pharmacy to follow patient daily and order levels / make dose adjustments as appropriate

## 2018-01-03 NOTE — PROGRESS NOTES
Hospitalist Progress Note  Orlando Mcfadden MD  Answering service: [phone] OR [phone] from in house phone  Cell: [phone]      Date of Service:  1/3/2018  NAME:  Kevin Richardson  :  1950  MRN:  [id number]      Admission Summary:     Patient is a 75year old male with past medical history of Hypertension, who was transferred from [facility] & [address] on account of altered mental status. He had a CT scan of the head done 18 showing opacity involving the right mastoid and middle ear cavity questioning otomastoditis. Lumbar puncture done showed wbc of 23,00, Neutrophils of 945 and CSF glu < 10    Patient was admitted for possible meningitis    Interval history / Subjective:       F/u for otomastoditis/meningitis    No acute overnight events. Patient is altered and minimally verbally responsive. Assessment & Plan:     Toxic metabolic encephalopathy: Secondary to Sepsis d/t Otomastoiditis causing meningitis as evidenced by CT scan of the head showing opacification of the right mastoid. CSF analysis also highly suggestive of meningitis. CT maxillofacial with contrast shows fluid/soft tissue opacification right middle ear and mastoid suggesting inflammation. ENT following and for possible mastoidectomy with ear tube placement  Continue ampicillin, ceftriaxone and vancomycin with Dexamethasone  I.D following    Bacterial Meningitis; Likely due to right sided mastoiditis  Antibiotics management as above  IVF    Sepsis: Meets criterial on admission.   Continue abx  Blood culture's no growth  Strict I's and O's    Right supra-hilar opacity: Presumed community acquired pneumonia  Continue abx    Code status: Full  DVT prophylaxis: heparin  Care Plan discussed with: Patient/Family and Nurse  Disposition: TBD, Patient requires inpatient care     Hospital Problems  Never Reviewed          Codes Class Noted POA    Pneumonia ICD-10-CM: J18.9  ICD-9-CM: 387  1/2/2018 Unknown        Bacterial meningitis ICD-10-CM: G00.9  ICD-9-CM: 320.9  1/2/2018 Unknown                Review of Systems:   Pertinent items are noted in HPI. Vital Signs:    Last 24hrs VS reviewed since prior progress note. Most recent are:  Visit Vitals    /64 (BP 1 Location: Right arm, BP Patient Position: At rest)    Pulse 86    Temp 99 °F (37.2 °C)    Resp 22    Ht 5' 9\" (1.753 m)    Wt 89.4 kg (197 lb 1.5 oz)    SpO2 96%    BMI 29.11 kg/m2         Intake/Output Summary (Last 24 hours) at 01/03/18 1131  Last data filed at 01/03/18 0545   Gross per 24 hour   Intake           108.33 ml   Output              800 ml   Net          -691.67 ml        Physical Examination:             Constitutional:  Acutely ill looking,      ENT:  Oral mucous dry, oropharynx benign. Neck supple,    Resp:  CTA bilaterally. No wheezing/rhonchi/rales. No accessory muscle use   CV:  Regular rhythm, normal rate, no murmurs, gallops, rubs    GI:  Soft, non distended, non tender. normoactive bowel sounds, no hepatosplenomegaly     Musculoskeletal:  No edema, warm, 2+ pulses throughout    Neurologic:  Moves all extremities. Awake but lethargic and in painful distress. Only oriented to name. Data Review:          Labs:     Recent Labs      01/03/18   0230  01/02/18   1132   WBC  23.3*  20.9*   HGB  12.1  13.5   HCT  35.9*  39.3   PLT  279  245     Recent Labs      01/03/18   0230  01/02/18   1132   NA  141  138   K  4.0  4.0   CL  109*  104   CO2  23  25   BUN  23*  18   CREA  1.18  1.30   GLU  129*  140*   CA  8.8  8.9     Recent Labs      01/03/18   0230  01/02/18   1132   SGOT  21  19   ALT  28  36   AP  104  89   TBILI  1.5*  1.9*   TP  6.6  7.0   ALB  2.4*  2.6*   GLOB  4.2*  4.4*   AML   --   120*   LPSE   --   478*     No results for input(s): INR, PTP, APTT in the last 72 hours.     No lab exists for component: INREXT   No results for input(s): FE, TIBC, PSAT, FERR in the last 72 hours. No results found for: FOL, RBCF   No results for input(s): PH, PCO2, PO2 in the last 72 hours. No results for input(s): CPK, CKNDX, TROIQ in the last 72 hours.     No lab exists for component: CPKMB  No results found for: CHOL, CHOLX, CHLST, CHOLV, HDL, LDL, LDLC, DLDLP, TGLX, TRIGL, TRIGP, CHHD, CHHDX  No results found for: GLUCPOC  No results found for: COLOR, APPRN, SPGRU, REFSG, GERARDO, PROTU, GLUCU, KETU, BILU, UROU, TRISTIN, LEUKU, GLUKE, EPSU, BACTU, WBCU, RBCU, CASTS, UCRY      Medications Reviewed:     Current Facility-Administered Medications   Medication Dose Route Frequency    vancomycin (VANCOCIN) 1500 mg in  ml infusion  1,500 mg IntraVENous Q16H    sodium chloride (NS) flush 5-10 mL  5-10 mL IntraVENous Q8H    sodium chloride (NS) flush 5-10 mL  5-10 mL IntraVENous PRN    0.9% sodium chloride infusion  125 mL/hr IntraVENous CONTINUOUS    HYDROcodone-acetaminophen (NORCO) 5-325 mg per tablet 1 Tab  1 Tab Oral Q4H PRN    HYDROmorphone (PF) (DILAUDID) injection 0.5 mg  0.5 mg IntraVENous Q4H PRN    naloxone (NARCAN) injection 0.4 mg  0.4 mg IntraVENous PRN    albuterol (PROVENTIL VENTOLIN) nebulizer solution 1.25 mg  1.25 mg Nebulization Q6H RT    heparin (porcine) injection 5,000 Units  5,000 Units SubCUTAneous Q8H    ampicillin (OMNIPEN) 2 g in 0.9% sodium chloride 100 mL IVPB  2 g IntraVENous Q4H    haloperidol lactate (HALDOL) injection 1 mg  1 mg IntraVENous Q4H PRN    acetaminophen (TYLENOL) suppository 650 mg  650 mg Rectal Q4H PRN    cefTRIAXone (ROCEPHIN) 2 g in 0.9% sodium chloride (MBP/ADV) 50 mL  2 g IntraVENous Q12H    dexamethasone (DECADRON) 12.5 mg in 0.9% sodium chloride 50 mL IVPB  12.5 mg IntraVENous Q6H    Vancomycin - pharmacy to dose   Other Rx Dosing/Monitoring     ______________________________________________________________________  EXPECTED LENGTH OF STAY: - - -  ACTUAL LENGTH OF STAY:          1                 Jannet Souza MD

## 2018-01-03 NOTE — PROGRESS NOTES
Films reviewed and possible mastoiditis causing meningitis - Right mastoidectomy with ear tube needed this evening.  Will need family consent please have at the bedside at 6pm for discussion : case posted

## 2018-01-03 NOTE — ANESTHESIA PREPROCEDURE EVALUATION
Anesthetic History   No history of anesthetic complications            Review of Systems / Medical History  Patient summary reviewed, nursing notes reviewed and pertinent labs reviewed    Pulmonary  Within defined limits                 Neuro/Psych   Within defined limits           Cardiovascular    Hypertension              Exercise tolerance: >4 METS     GI/Hepatic/Renal     GERD           Endo/Other  Within defined limits           Other Findings   Comments:   Bacterial meningitis          Physical Exam    Airway  Mallampati: II  TM Distance: 4 - 6 cm  Neck ROM: normal range of motion   Mouth opening: Normal     Cardiovascular  Regular rate and rhythm,  S1 and S2 normal,  no murmur, click, rub, or gallop             Dental  No notable dental hx       Pulmonary  Breath sounds clear to auscultation               Abdominal  GI exam deferred       Other Findings            Anesthetic Plan    ASA: 3  Anesthesia type: general          Induction: Intravenous  Anesthetic plan and risks discussed with: Patient

## 2018-01-04 LAB
ANION GAP SERPL CALC-SCNC: 9 MMOL/L (ref 5–15)
BASOPHILS # BLD: 0 K/UL (ref 0–0.1)
BASOPHILS NFR BLD: 0 % (ref 0–1)
BUN SERPL-MCNC: 34 MG/DL (ref 6–20)
BUN/CREAT SERPL: 33 (ref 12–20)
CALCIUM SERPL-MCNC: 8.4 MG/DL (ref 8.5–10.1)
CHLORIDE SERPL-SCNC: 114 MMOL/L (ref 97–108)
CO2 SERPL-SCNC: 20 MMOL/L (ref 21–32)
CREAT SERPL-MCNC: 1.03 MG/DL (ref 0.7–1.3)
DIFFERENTIAL METHOD BLD: ABNORMAL
EOSINOPHIL # BLD: 0 K/UL (ref 0–0.4)
EOSINOPHIL NFR BLD: 0 % (ref 0–7)
ERYTHROCYTE [DISTWIDTH] IN BLOOD BY AUTOMATED COUNT: 13.2 % (ref 11.5–14.5)
GLUCOSE SERPL-MCNC: 125 MG/DL (ref 65–100)
HCT VFR BLD AUTO: 32.7 % (ref 36.6–50.3)
HGB BLD-MCNC: 10.8 G/DL (ref 12.1–17)
LYMPHOCYTES # BLD: 0.7 K/UL (ref 0.8–3.5)
LYMPHOCYTES NFR BLD: 4 % (ref 12–49)
MAGNESIUM SERPL-MCNC: 2.4 MG/DL (ref 1.6–2.4)
MCH RBC QN AUTO: 29.7 PG (ref 26–34)
MCHC RBC AUTO-ENTMCNC: 33 G/DL (ref 30–36.5)
MCV RBC AUTO: 89.8 FL (ref 80–99)
MONOCYTES # BLD: 0.7 K/UL (ref 0–1)
MONOCYTES NFR BLD: 4 % (ref 5–13)
NEUTS BAND NFR BLD MANUAL: 3 % (ref 0–6)
NEUTS SEG # BLD: 15.3 K/UL (ref 1.8–8)
NEUTS SEG NFR BLD: 89 % (ref 32–75)
PLATELET # BLD AUTO: 293 K/UL (ref 150–400)
POTASSIUM SERPL-SCNC: 4 MMOL/L (ref 3.5–5.1)
RBC # BLD AUTO: 3.64 M/UL (ref 4.1–5.7)
RBC MORPH BLD: ABNORMAL
RBC MORPH BLD: ABNORMAL
SODIUM SERPL-SCNC: 143 MMOL/L (ref 136–145)
WBC # BLD AUTO: 16.7 K/UL (ref 4.1–11.1)

## 2018-01-04 PROCEDURE — 74011000258 HC RX REV CODE- 258: Performed by: INTERNAL MEDICINE

## 2018-01-04 PROCEDURE — 93306 TTE W/DOPPLER COMPLETE: CPT

## 2018-01-04 PROCEDURE — 80048 BASIC METABOLIC PNL TOTAL CA: CPT | Performed by: HOSPITALIST

## 2018-01-04 PROCEDURE — 85025 COMPLETE CBC W/AUTO DIFF WBC: CPT | Performed by: HOSPITALIST

## 2018-01-04 PROCEDURE — 51798 US URINE CAPACITY MEASURE: CPT

## 2018-01-04 PROCEDURE — 74011250637 HC RX REV CODE- 250/637: Performed by: SPECIALIST

## 2018-01-04 PROCEDURE — 36415 COLL VENOUS BLD VENIPUNCTURE: CPT | Performed by: HOSPITALIST

## 2018-01-04 PROCEDURE — 74011250636 HC RX REV CODE- 250/636: Performed by: INTERNAL MEDICINE

## 2018-01-04 PROCEDURE — 94640 AIRWAY INHALATION TREATMENT: CPT

## 2018-01-04 PROCEDURE — 74011000250 HC RX REV CODE- 250: Performed by: INTERNAL MEDICINE

## 2018-01-04 PROCEDURE — 83735 ASSAY OF MAGNESIUM: CPT | Performed by: HOSPITALIST

## 2018-01-04 PROCEDURE — 65270000032 HC RM SEMIPRIVATE

## 2018-01-04 RX ORDER — IRBESARTAN 150 MG/1
150 TABLET ORAL
Status: DISCONTINUED | OUTPATIENT
Start: 2018-01-04 | End: 2018-01-04 | Stop reason: CLARIF

## 2018-01-04 RX ORDER — FLUTICASONE PROPIONATE 50 MCG
2 SPRAY, SUSPENSION (ML) NASAL DAILY
Status: DISCONTINUED | OUTPATIENT
Start: 2018-01-04 | End: 2018-01-09 | Stop reason: HOSPADM

## 2018-01-04 RX ORDER — PHENOL/SODIUM PHENOLATE
20 AEROSOL, SPRAY (ML) MUCOUS MEMBRANE DAILY
Status: DISCONTINUED | OUTPATIENT
Start: 2018-01-04 | End: 2018-01-04 | Stop reason: CLARIF

## 2018-01-04 RX ORDER — ALBUTEROL SULFATE 0.83 MG/ML
1.25 SOLUTION RESPIRATORY (INHALATION)
Status: DISCONTINUED | OUTPATIENT
Start: 2018-01-04 | End: 2018-01-07

## 2018-01-04 RX ORDER — TAMSULOSIN HYDROCHLORIDE 0.4 MG/1
0.4 CAPSULE ORAL DAILY
Status: DISCONTINUED | OUTPATIENT
Start: 2018-01-04 | End: 2018-01-09 | Stop reason: HOSPADM

## 2018-01-04 RX ORDER — CIPROFLOXACIN AND DEXAMETHASONE 3; 1 MG/ML; MG/ML
4 SUSPENSION/ DROPS AURICULAR (OTIC) 2 TIMES DAILY
Status: DISCONTINUED | OUTPATIENT
Start: 2018-01-04 | End: 2018-01-09 | Stop reason: HOSPADM

## 2018-01-04 RX ORDER — LOSARTAN POTASSIUM 25 MG/1
50 TABLET ORAL DAILY
Status: DISCONTINUED | OUTPATIENT
Start: 2018-01-04 | End: 2018-01-09 | Stop reason: HOSPADM

## 2018-01-04 RX ORDER — HYDROCODONE POLISTIREX AND CHLORPHENIRAMINE POLISTIREX 10; 8 MG/5ML; MG/5ML
5 SUSPENSION, EXTENDED RELEASE ORAL
Status: DISCONTINUED | OUTPATIENT
Start: 2018-01-04 | End: 2018-01-04

## 2018-01-04 RX ORDER — PANTOPRAZOLE SODIUM 40 MG/1
40 TABLET, DELAYED RELEASE ORAL
Status: DISCONTINUED | OUTPATIENT
Start: 2018-01-04 | End: 2018-01-09 | Stop reason: HOSPADM

## 2018-01-04 RX ORDER — CIPROFLOXACIN AND DEXAMETHASONE 3; 1 MG/ML; MG/ML
4 SUSPENSION/ DROPS AURICULAR (OTIC) 2 TIMES DAILY
Status: DISCONTINUED | OUTPATIENT
Start: 2018-01-04 | End: 2018-01-04

## 2018-01-04 RX ADMIN — Medication 10 ML: at 15:06

## 2018-01-04 RX ADMIN — HEPARIN SODIUM 5000 UNITS: 5000 INJECTION, SOLUTION INTRAVENOUS; SUBCUTANEOUS at 08:51

## 2018-01-04 RX ADMIN — Medication 10 ML: at 21:27

## 2018-01-04 RX ADMIN — SODIUM CHLORIDE 125 ML/HR: 900 INJECTION, SOLUTION INTRAVENOUS at 12:56

## 2018-01-04 RX ADMIN — Medication 10 ML: at 06:42

## 2018-01-04 RX ADMIN — VANCOMYCIN HYDROCHLORIDE 1500 MG: 10 INJECTION, POWDER, LYOPHILIZED, FOR SOLUTION INTRAVENOUS at 06:41

## 2018-01-04 RX ADMIN — ALBUTEROL SULFATE 1.25 MG: 2.5 SOLUTION RESPIRATORY (INHALATION) at 01:44

## 2018-01-04 RX ADMIN — CEFTRIAXONE 2 G: 2 INJECTION, POWDER, FOR SOLUTION INTRAMUSCULAR; INTRAVENOUS at 21:26

## 2018-01-04 RX ADMIN — AMPICILLIN SODIUM 2 G: 2 INJECTION, POWDER, FOR SOLUTION INTRAVENOUS at 02:16

## 2018-01-04 RX ADMIN — HEPARIN SODIUM 5000 UNITS: 5000 INJECTION, SOLUTION INTRAVENOUS; SUBCUTANEOUS at 16:40

## 2018-01-04 RX ADMIN — CIPROFLOXACIN AND DEXAMETHASONE 4 DROP: 3; 1 SUSPENSION/ DROPS AURICULAR (OTIC) at 19:17

## 2018-01-04 RX ADMIN — CEFTRIAXONE 2 G: 2 INJECTION, POWDER, FOR SOLUTION INTRAMUSCULAR; INTRAVENOUS at 01:55

## 2018-01-04 RX ADMIN — Medication 10 ML: at 02:14

## 2018-01-04 RX ADMIN — Medication 10 ML: at 06:41

## 2018-01-04 RX ADMIN — DEXAMETHASONE SODIUM PHOSPHATE 8 MG: 4 INJECTION, SOLUTION INTRAMUSCULAR; INTRAVENOUS at 19:26

## 2018-01-04 RX ADMIN — DEXAMETHASONE SODIUM PHOSPHATE 8 MG: 4 INJECTION, SOLUTION INTRAMUSCULAR; INTRAVENOUS at 15:03

## 2018-01-04 RX ADMIN — DEXAMETHASONE SODIUM PHOSPHATE 8 MG: 4 INJECTION, SOLUTION INTRAMUSCULAR; INTRAVENOUS at 06:41

## 2018-01-04 RX ADMIN — LOSARTAN POTASSIUM 50 MG: 25 TABLET ORAL at 09:36

## 2018-01-04 RX ADMIN — ALBUTEROL SULFATE 1.25 MG: 2.5 SOLUTION RESPIRATORY (INHALATION) at 09:17

## 2018-01-04 RX ADMIN — VANCOMYCIN HYDROCHLORIDE 1500 MG: 10 INJECTION, POWDER, LYOPHILIZED, FOR SOLUTION INTRAVENOUS at 22:10

## 2018-01-04 RX ADMIN — SODIUM CHLORIDE 125 ML/HR: 900 INJECTION, SOLUTION INTRAVENOUS at 01:51

## 2018-01-04 RX ADMIN — PANTOPRAZOLE SODIUM 40 MG: 40 TABLET, DELAYED RELEASE ORAL at 08:51

## 2018-01-04 RX ADMIN — CEFTRIAXONE 2 G: 2 INJECTION, POWDER, FOR SOLUTION INTRAMUSCULAR; INTRAVENOUS at 09:36

## 2018-01-04 RX ADMIN — AMPICILLIN SODIUM 2 G: 2 INJECTION, POWDER, FOR SOLUTION INTRAVENOUS at 06:41

## 2018-01-04 RX ADMIN — FLUTICASONE PROPIONATE 2 SPRAY: 50 SPRAY, METERED NASAL at 12:11

## 2018-01-04 RX ADMIN — SODIUM CHLORIDE 125 ML/HR: 900 INJECTION, SOLUTION INTRAVENOUS at 20:18

## 2018-01-04 RX ADMIN — TAMSULOSIN HYDROCHLORIDE 0.4 MG: 0.4 CAPSULE ORAL at 09:36

## 2018-01-04 RX ADMIN — DEXAMETHASONE SODIUM PHOSPHATE 8 MG: 4 INJECTION, SOLUTION INTRAMUSCULAR; INTRAVENOUS at 01:51

## 2018-01-04 NOTE — ANESTHESIA POSTPROCEDURE EVALUATION
Post-Anesthesia Evaluation and Assessment    Patient: Viri Guillen MRN: [id number]  SSN: [id number]    YOB: 1950  Age: 75 y.o. Sex: male       Cardiovascular Function/Vital Signs  Visit Vitals    /76 (BP 1 Location: Right arm, BP Patient Position: At rest)    Pulse 97    Temp 36.6 °C (97.9 °F)    Resp 19    Ht 5' 9\" (1.753 m)    Wt 89.4 kg (197 lb 1.5 oz)    SpO2 97%    BMI 29.11 kg/m2       Patient is status post general anesthesia for Procedure(s):  TYMPANOMASTOIDECTOMY AND EAR TUBES- RIGHT; PLACEMENT OF FACIAL NERVE ELECTRODES. Nausea/Vomiting: None    Postoperative hydration reviewed and adequate. Pain:  Pain Scale 1: Numeric (0 - 10) (01/03/18 2343)  Pain Intensity 1: 0 (01/03/18 2343)   Managed    Neurological Status:   Neuro (WDL): Exceptions to WDL (01/03/18 2200)  Neuro  Neurologic State: Confused; Eyes open to voice (01/03/18 2200)  Orientation Level: Disoriented to place; Disoriented to situation;Disoriented to time;Oriented to person (01/03/18 3580)  Speech: Clear;Delayed responses (01/02/18 2055)  LUE Motor Response: Purposeful (01/03/18 0822)  LLE Motor Response: Purposeful (01/03/18 0822)  RUE Motor Response: Purposeful (01/03/18 6354)  RLE Motor Response: Purposeful (01/03/18 6361)   At baseline    Mental Status and Level of Consciousness: Arousable    Pulmonary Status:   O2 Device: Room air (01/04/18 0144)   Adequate oxygenation and airway patent    Complications related to anesthesia: None    Post-anesthesia assessment completed.  No concerns    Signed By: Danisha Dow MD     January 4, 2018

## 2018-01-04 NOTE — ROUTINE PROCESS
Patient: Rodrigue Thurman MRN: [id number]  SSN: [id number]   YOB: 1950  Age: 75 y.o. Sex: male     Patient is status post Procedure(s):  TYMPANOMASTOIDECTOMY AND EAR TUBES- RIGHT; PLACEMENT OF FACIAL NERVE ELECTRODES. Surgeon(s) and Role:     * Rachelle Lawton MD - Primary    Local/Dose/Irrigation:  10 ML 1% LIDOCAINE WITH EPINEPHRINE WAS INJECTED TO RIGHT EAR                    Peripheral IV 01/02/18 Left Antecubital (Active)   Site Assessment Clean, dry, & intact 1/3/2018  8:29 AM   Phlebitis Assessment 0 1/3/2018  8:29 AM   Infiltration Assessment 0 1/3/2018  8:29 AM   Dressing Status Clean, dry, & intact 1/3/2018  8:29 AM   Dressing Type Transparent 1/3/2018  8:29 AM   Hub Color/Line Status Pink;Flushed 1/3/2018  8:29 AM   Action Taken Open ports on tubing capped 1/3/2018  8:29 AM   Alcohol Cap Used Yes 1/3/2018  8:29 AM       Peripheral IV 01/03/18 Left Antecubital (Active)   Site Assessment Clean, dry, & intact 1/3/2018  8:31 AM   Phlebitis Assessment 0 1/3/2018  8:31 AM   Infiltration Assessment 0 1/3/2018  8:31 AM   Dressing Status Clean, dry, & intact 1/3/2018  8:31 AM   Dressing Type Transparent 1/3/2018  8:31 AM   Hub Color/Line Status Yellow; Infusing;Flushed 1/3/2018  8:31 AM   Action Taken Open ports on tubing capped 1/3/2018  8:31 AM   Alcohol Cap Used Yes 1/3/2018  8:31 AM       Peripheral IV 01/03/18 Right Wrist (Active)            Airway - Endotracheal Tube 01/03/18 Oral (Active)                   Dressing/Packing:  Wound Ear Right;Posterior-DRESSING TYPE: Topical skin adhesive/glue; Cotton ball(s); Ear protector (01/03/18 2006)  Splint/Cast:  ]    Other:  [facility]

## 2018-01-04 NOTE — PROGRESS NOTES
Wife [facility] called and left the patient's Family Doctor's name (Dr. Greg Stephens)  and contact number - ([phone])  for Dr. Luis Fernando Perez to contact. Wife wants to be called by Dr. Luis Fernando Perez after.

## 2018-01-04 NOTE — BRIEF OP NOTE
BRIEF OPERATIVE NOTE    Date of Procedure: 1/3/2018   Preoperative Diagnosis: acute mastoiditis  Postoperative Diagnosis: acute mastoiditis    Procedure(s):  TYMPANOMASTOIDECTOMY AND EAR TUBE  RIGHT; PLACEMENT OF FACIAL NERVE ELECTRODES  Surgeon(s) and Role:     * Renaldo Santos MD - Primary         Assistant Staff:       Surgical Staff:  Circ-1: Antonio Herbert, RN  Scrub RN-1: Sharlene Lynne RN  Event Time In   Incision Start 1932   Incision Close 2006     Anesthesia: General   Estimated Blood Loss: 5 cc  Specimens:   ID Type Source Tests Collected by Time Destination   1 : MASTOID Body Fluid Mastoid GRAM STAIN, CULTURE, FUNGUS, AEROBIC/ANAEROBIC Denilson Menchaca MD 1/3/2018 1946 Microbiology      Findings: granulation tissue in the mastoid    Complications: none  Implants:   Implant Name Type Inv.  Item Serial No.  Lot No. LRB No. Used Action   TUBE T TB CUST NEWBILL 5.5X5MM -- 5EA/PK - SNA   TUBE T TB CUST NEWBILL 5.5X5MM -- 5EA/PK NA [employer] [id number] Right 1 Implanted

## 2018-01-04 NOTE — OP NOTES
[address] [facility] OP NOTE    Name:Kali BAPTISTE  MR#: [id number]  : 1950  ACCOUNT #: [de-identified]   DATE OF SERVICE: 2018    PREOPERATIVE DIAGNOSIS:  Right acute mastoiditis. POSTOPERATIVE DIAGNOSIS:  Right acute mastoiditis. PROCEDURE:  1. Right tympanomastoidectomy. 2.  Myringotomy tubes. 3.  Facial nerve monitoring. 4.  Microdissection. 5.  Placement of facial nerve electrodes. SURGEON:  Ioana Sharma. Karina Lira MD    ANESTHESIA:  General.    BLOOD LOSS:       COMPLICATIONS:  None. SPECIMENS REMOVED:  None. CULTURES:  Mastoid contents. FINDINGS:  Granulation tissue found throughout the mastoid cavity. The antrum found to be open. Middle ear tympanic membrane erythematous. No araceli pus identified. Tube placed. INDICATION FOR OPERATION:  The patient is a 27-year-old who unfortunately has had right chronic ear problems and has been treated with antibiotics over the past several months. Unfortunately, he presented with meningitis with an opacified mastoid on the right side. Temporal bone scan revealed nearly completely occluded mastoid air cells with soft tissue filling the mastoid air cell space. There was no direct communication between the mastoid and the CSF identified on the temporal bone scan. However, there did appear to be a quite thin tegmen and it was certainly suspected preoperatively. Cultures were taken during the operation and there were no identifiable connections between the CSF and the mastoid bowl. PROCEDURE IN DETAIL:  After the patient received informed consent, he was taken to the operating room. The patient was kept in the supine position and draped in the usual fashion. After administration of general anesthesia, the table turned 180 degrees away from the neutral position. Facial nerve electrodes were placed in the right orbicularis oris and right orbicularis oculi muscles.   Continuous facial nerve monitoring took place for the entire case. The baseline facial muscular activity was less than 10 mA. There was no aberrant facial nerve stimulation throughout the entire case. Electrode impedances were checked preoperatively and found to be less than 1.0 kilohms. The operation began on the right side. The operating microscope was used for the entire case. Approximately 10 mL of 1% lidocaine with 1:100,000 epinephrine was injected postauricularly as well as endaurally. A postauricular incision was then made. The deeper tissues were incised and held in place with self-retaining retractors. Using continuous suction irrigation and a cutting bur, standard mastoidectomy was then drilled. The mastoid was opened. It was quite erythematous and there is granulation tissue found in the mastoid air cells. A culture was then taken. The mastoid air cells were then removed in systematic fashion. The antrum was found to be open. The sigmoid sinus appeared to be quite superficial, but was not entered. Most of the air cells in the mastoid were then removed and the antrum was verified to be open. A tympanomeatal flap was constructed with thru a  vascular strip incision. The middle ear was entered. There was no pus in the middle ear space. The superior portion of the tympanic membrane did appear to have a small rupture. It was quite erythematous and bulging. There was no pus; however, it did appear that there was an infection at one time in this place. An anterior inferior incision was made. A T-tube was then placed. The tympanomeatal flap was reapproximated. External auditory canal was packed with Cipro Gelfoam.  The postauricular incision was closed with 3-0, 4-0 Vicryl in interrupted fashion. Dermabond was used to the skin. All counts were correct at the end of the case. The patient tolerated the procedure well and went to the PACU in stable condition.       MD MAEVE Troncoso / SHAYAN  D: 01/03/2018 20:22 T: 01/03/2018 23:25  JOB #: [id number]

## 2018-01-04 NOTE — PROGRESS NOTES
Bedside shift change report given to Salvador Abbasi  (oncoming nurse) by Sylvester Rodriguez RN  (offgoing nurse). Report included the following information SBAR and MAR.

## 2018-01-04 NOTE — PROGRESS NOTES
Pt examined  Cognitively appears improved    Incision intact  FN stable    Needs to have cotton ball changed q shift  ciprodex ear drops  Continue IV abx  F/u one week after discharge

## 2018-01-04 NOTE — PROGRESS NOTES
TRANSFER - IN REPORT:    Verbal report received from Amber Valverde RN on Adam's  being received from PACU/OR 3 for routine progression of care. Report consisted of patients Situation, Background, Assessment and   Recommendations(SBAR). Information from the following report(s) SBAR, Kardex, ED Summary, OR Summary, Intake/Output, MAR and Recent Results was reviewed with the receiving nurse. Opportunity for questions and clarification was provided. Assessment completed upon patients arrival to unit and care assumed.

## 2018-01-04 NOTE — PROGRESS NOTES
Sarina Mcgee from [facility] called, no need for the patient to be on droplet precaution.

## 2018-01-04 NOTE — PROGRESS NOTES
Jossy Cruz is a 75 y.o. with a history of vertigo/meniere's disease GERD and hypertension who was transferred from [facility]. He presented to the outside ER due to a change in mental status. Per records he had been sick for six days with vomiting, headache, diarrhea and altered mental status. He had been seen by a physician and started on an antibiotic for bronchitis. Per chart review workup included CXR, CT head, lumbar puncture, blood cultures and urine culture. Outside labs [place] [address], Lactic acid of 2.4,   LP demonstrated 2300 white cells with 94% neutrophils,  CSF glucose < 10.  no RBC an total protein >300. GS CSF had many WBC's but no organisms. Nichole Da Silva He was treated with Zosyn 3.375g last night. Has received 2g of Ceftriaxone and 2g of Vancomycin this morning. He was transferred to [facility] for higher level of care. Spoke to patient 's wife - he has been feeling unwell since before [place] for the past 10 days. 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 HE started with cough and then had headache - He went to [facility] over the weekend and was given zpak and prednisone. He started developing fever the next day and was c/o headache, nausea and vomiting HE was also dizzy. He was restless the whole night and on Monday as his temp was 104 and he was taken to [facility] on 1/1/18. CT head with out contrast showed opacity involving portion of rt mastoid and middle ear cavity questioning otomastoiditis  He got a dose of zosyn and then had LP which was as above and then given ceftriaxone/vanco and transferred                 Data  this hospitalization  Date  TMAX WBC Abn labs Cultures ABX   1/2/18 101.3 20.9  Cr 1.30  T. bilirubin - 1.9, Glucose 140 Blood cultures,   Respiratory culture- pending Ctx  Ampicillin  vanco    1/3/18    23. 3          1/4/17  97.4  16.7  cr 1.03    DC ampicillin                                                                             subjective  Mental status improved         Objective:   VITALS:     Visit Vitals    /75 (BP 1 Location: Right arm, BP Patient Position: At rest)    Pulse (!) 105    Temp 97.4 °F (36.3 °C)    Resp 20    Ht 5' 9\" (1.753 m)    Wt 197 lb 1.5 oz (89.4 kg)    SpO2 98%    BMI 29.11 kg/m2     PHYSICAL EXAM:   General:   lmore alert , walked to the rest room- slow response at times, some confusion  Head:                                   Normocephalic, without obvious abnormality, atraumatic. Eyes:                                   Conjunctivae clear, anicteric sclerae. Pupils are equal  Nose:                                   Nares normal. No drainage or sinus tenderness. Rt ear- surgical dressing   Neck stiffness    Lungs:                       B/l air entry  Heart:                                  s1s2   Abdomen:                  Soft, non-tender,not distended. Bowel sounds normal. No masses  Extremities:               Extremities normal, atraumatic, no cyanosis. No edema. No clubbing  Skin:                                    No rashes or lesions. Not Jaundiced  Lymph:                      Cervical, supraclavicular normal.  Neurologic:                Grossly non-focal   moves all limbs    Pertinent Labs   BC from OSH-   strep pneumo  Csf-NG     IMAGING RESULTS:     CT head  Chronic ethmoid sinusitis. Partial opacification of right mastoid air cell, question otomastoiditis.             Impression/Recommendation  76 yo male with history of hypertension and GERD who is admitted with altered mental status,headache    fever, high wbc, csf neutrophilic pleocytosis        Bacterial Meningitis  secondary to the right  otomastoiditis-  Encephalopathy much improved  Likely due to pneumococcus ( eventhough CSF culture neg)  Will DC steroids tomorrow     S/p Right tympanomastoidectomy.   2.  Myringotomy tubes    Pneumococcus bacteremia     blood culture 4 out of 4 bottles ( from [facility]) is pneumococcus   Continue vanco/ceftriaxone --DC ampicillin  Will get 2 d echo to r/o vegetation    Rt pneumonia - likely due to pneumococcus    Rt otomastoiditis-s/p surgery- cultures sent     Rt  Pneumonia -      HTN- management as per primary team     Discussed with patient his son and his wife and

## 2018-01-04 NOTE — PROGRESS NOTES
Problem: Falls - Risk of  Goal: *Absence of Falls  Document Lisbeth Fall Risk and appropriate interventions in the flowsheet.    Outcome: Progressing Towards Goal  Fall Risk Interventions:  Mobility Interventions: Bed/chair exit alarm, Communicate number of staff needed for ambulation/transfer, Patient to call before getting OOB    Mentation Interventions: Adequate sleep, hydration, pain control, Bed/chair exit alarm, Door open when patient unattended, More frequent rounding    Medication Interventions: Bed/chair exit alarm, Patient to call before getting OOB, Teach patient to arise slowly    Elimination Interventions: Call light in reach, Patient to call for help with toileting needs, Toileting schedule/hourly rounds

## 2018-01-04 NOTE — PROGRESS NOTES
Bedside shift change report given to Klaus RN (oncoming nurse) by Noah Santoyo RN (offgoing nurse). Report included the following information SBAR, Kardex, ED Summary, [facility] and Recent Results. Patient currently in [address] [place] [address].    12:09 AM: Mahogany Nelson for the second time to request IV pump and pole since patient has IV fluids and IV antibiotics ordered. I was told that they have an IV pump but currently don't have any poles but will bring a pole once they locate one.

## 2018-01-04 NOTE — PROGRESS NOTES
Hospitalist Progress Note  Jose Ramon Tan MD  Answering service: [phone] OR [phone] from in house phone  Cell: [phone]      Date of Service:  2018  NAME:  Marlin Lane  :  1950  MRN:  [id number]      Admission Summary:     Patient is a 75year old male with past medical history of Hypertension, who was transferred from [facility] & [address] on account of altered mental status. He had a CT scan of the head done 18 showing opacity involving the right mastoid and middle ear cavity questioning otomastoditis. Lumbar puncture done showed wbc of 23,00, Neutrophils of 945 and CSF glu < 10    Patient was admitted for possible meningitis    Interval history / Subjective:       F/u for otomastoditis/meningitis    No acute overnight events. S/p Tympanomastoidectomy and ear tube right with placement of facial nerve electrodes. POD # 1  He is complaining of pain all over. Says he he having difficulty in passing urine. No fever or chills. He looks overall, better than yesterday. Assessment & Plan:     Toxic metabolic encephalopathy: Secondary to Sepsis d/t Otomastoiditis causing meningitis as evidenced by CT scan of the head showing opacification of the right mastoid. CSF analysis also highly suggestive of meningitis. CT maxillofacial with contrast shows fluid/soft tissue opacification right middle ear and mastoid suggesting inflammation. S/p Tympanomastoidectomy and ear tube right with placement of facial nerve electrodes. POD # 1  Improving. Now oriented and conversant  Continue supportive care    Otomastoiditis:S/p Tympanomastoidectomy and ear tube right with placement of facial nerve electrodes. POD # 1  Cultures negative so far from mastoid area  Management as per ENT    Bacterial Meningitis;  Likely due to right sided mastoiditis  Prelim blood cx from [facility] shows alpha hemolytic strep  CSF culture 1/2/18 negative so far  On Ceftriaxone, ampicillin and Vancomycin. Antibiotic management as per infectious disease  IVF    Sepsis: Meets criterial on admission. Continue abx  Blood culture's no growth  Strict I's and O's    Right supra-hilar opacity: Presumed community acquired pneumonia  Continue current abx    Code status: Full  DVT prophylaxis: heparin  Care Plan discussed with: Patient/Family and Nurse  Disposition: TBD, Patient requires inpatient care     Hospital Problems  Never Reviewed          Codes Class Noted POA    Pneumonia ICD-10-CM: J18.9  ICD-9-CM: 114  1/2/2018 Unknown        Bacterial meningitis ICD-10-CM: G00.9  ICD-9-CM: 320.9  1/2/2018 Unknown                Review of Systems:   Pertinent items are noted in HPI. Vital Signs:    Last 24hrs VS reviewed since prior progress note. Most recent are:  Visit Vitals    /76 (BP 1 Location: Right arm, BP Patient Position: At rest)    Pulse 97    Temp 97.9 °F (36.6 °C)    Resp 19    Ht 5' 9\" (1.753 m)    Wt 89.4 kg (197 lb 1.5 oz)    SpO2 98%    BMI 29.11 kg/m2         Intake/Output Summary (Last 24 hours) at 01/04/18 0926  Last data filed at 01/04/18 0641   Gross per 24 hour   Intake              825 ml   Output              230 ml   Net              595 ml        Physical Examination:             Constitutional:  Acutely ill looking,  And painful distress   ENT:  Oral mucous dry, oropharynx benign. Neck supple, dressing around the right mastoid area- dry and intact   Resp:  CTA bilaterally. No wheezing/rhonchi/rales. No accessory muscle use   CV:  Regular rhythm, normal rate, no murmurs, gallops, rubs    GI:  Soft, non distended, non tender. normoactive bowel sounds, no hepatosplenomegaly     Musculoskeletal:  No edema, warm, 2+ pulses throughout    Neurologic:  Moves all extremities. Awake, lethargic and oriented to name but not to time or place.           Data Review:          Labs:     Recent Labs      01/04/18   0503  01/03/18 0230   WBC  16.7*  23.3*   HGB  10.8*  12.1   HCT  32.7*  35.9*   PLT  293  279     Recent Labs      01/04/18   0503  01/03/18   0230  01/02/18   1132   NA  143  141  138   K  4.0  4.0  4.0   CL  114*  109*  104   CO2  20*  23  25   BUN  34*  23*  18   CREA  1.03  1.18  1.30   GLU  125*  129*  140*   CA  8.4*  8.8  8.9   MG  2.4   --    --      Recent Labs      01/03/18   0230  01/02/18   1132   SGOT  21  19   ALT  28  36   AP  104  89   TBILI  1.5*  1.9*   TP  6.6  7.0   ALB  2.4*  2.6*   GLOB  4.2*  4.4*   AML   --   120*   LPSE   --   478*     No results for input(s): INR, PTP, APTT in the last 72 hours. No lab exists for component: INREXT, INREXT   No results for input(s): FE, TIBC, PSAT, FERR in the last 72 hours. No results found for: FOL, RBCF   No results for input(s): PH, PCO2, PO2 in the last 72 hours. No results for input(s): CPK, CKNDX, TROIQ in the last 72 hours.     No lab exists for component: CPKMB  No results found for: CHOL, CHOLX, CHLST, CHOLV, HDL, LDL, LDLC, DLDLP, TGLX, TRIGL, TRIGP, CHHD, CHHDX  No results found for: GLUCPOC  No results found for: COLOR, APPRN, SPGRU, REFSG, GERARDO, PROTU, GLUCU, KETU, BILU, UROU, TRISTIN, LEUKU, GLUKE, EPSU, BACTU, WBCU, RBCU, CASTS, UCRY      Medications Reviewed:     Current Facility-Administered Medications   Medication Dose Route Frequency    fluticasone (FLONASE) 50 mcg/actuation nasal spray 2 Spray  2 Spray Both Nostrils DAILY    tamsulosin (FLOMAX) capsule 0.4 mg  0.4 mg Oral DAILY    losartan (COZAAR) tablet 50 mg  50 mg Oral DAILY    pantoprazole (PROTONIX) tablet 40 mg  40 mg Oral ACB    vancomycin (VANCOCIN) 1500 mg in  ml infusion  1,500 mg IntraVENous Q16H    dexamethasone (DECADRON) 4 mg/mL injection 8 mg  8 mg IntraVENous Q6H    sodium chloride (NS) flush 5-10 mL  5-10 mL IntraVENous Q8H    sodium chloride (NS) flush 5-10 mL  5-10 mL IntraVENous PRN    sodium chloride (NS) flush 5-10 mL  5-10 mL IntraVENous Q8H    sodium chloride (NS) flush 5-10 mL  5-10 mL IntraVENous PRN    0.9% sodium chloride infusion  125 mL/hr IntraVENous CONTINUOUS    HYDROcodone-acetaminophen (NORCO) 5-325 mg per tablet 1 Tab  1 Tab Oral Q4H PRN    HYDROmorphone (PF) (DILAUDID) injection 0.5 mg  0.5 mg IntraVENous Q4H PRN    naloxone (NARCAN) injection 0.4 mg  0.4 mg IntraVENous PRN    albuterol (PROVENTIL VENTOLIN) nebulizer solution 1.25 mg  1.25 mg Nebulization Q6H RT    heparin (porcine) injection 5,000 Units  5,000 Units SubCUTAneous Q8H    ampicillin (OMNIPEN) 2 g in 0.9% sodium chloride 100 mL IVPB  2 g IntraVENous Q4H    haloperidol lactate (HALDOL) injection 1 mg  1 mg IntraVENous Q4H PRN    acetaminophen (TYLENOL) suppository 650 mg  650 mg Rectal Q4H PRN    cefTRIAXone (ROCEPHIN) 2 g in 0.9% sodium chloride (MBP/ADV) 50 mL  2 g IntraVENous Q12H    Vancomycin - pharmacy to dose   Other Rx Dosing/Monitoring     ______________________________________________________________________  EXPECTED LENGTH OF STAY: 4d 21h  ACTUAL LENGTH OF STAY:          2                 Uche Silva MD

## 2018-01-04 NOTE — PERIOP NOTES
TRANSFER - OUT REPORT:    Verbal report given to Klaus RN(name) on [facility]  being transferred to 60(unit) for routine post - op       Report consisted of patients Situation, Background, Assessment and   Recommendations(SBAR). Information from the following report(s) SBAR, OR Summary, MAR and Cardiac Rhythm NSR. was reviewed with the receiving nurse. Opportunity for questions and clarification was provided. Is the patient on 02? No    Is the patient on a monitor? NO    Is the nurse transporting with the patient? NO    Surgical Waiting Area notified of patient's transfer from PACU? YES      The following personal items collected during your admission accompanied patient upon transfer:   Dental Appliance: Dental Appliances: None  Vision:    Hearing Aid:    Jewelry: Jewelry: Ring  Clothing: Clothing: None  Other Valuables:  Other Valuables: None  Valuables sent to safe:

## 2018-01-04 NOTE — PROGRESS NOTES
Problem: Falls - Risk of  Goal: *Absence of Falls  Document Lisbeth Fall Risk and appropriate interventions in the flowsheet.    Outcome: Progressing Towards Goal  Fall Risk Interventions:  Mobility Interventions: Bed/chair exit alarm    Mentation Interventions: Adequate sleep, hydration, pain control    Medication Interventions: Bed/chair exit alarm    Elimination Interventions: Bed/chair exit alarm

## 2018-01-05 LAB
ANION GAP SERPL CALC-SCNC: 8 MMOL/L (ref 5–15)
BASOPHILS # BLD: 0 K/UL (ref 0–0.1)
BASOPHILS NFR BLD: 0 % (ref 0–1)
BUN SERPL-MCNC: 30 MG/DL (ref 6–20)
BUN/CREAT SERPL: 28 (ref 12–20)
CALCIUM SERPL-MCNC: 8 MG/DL (ref 8.5–10.1)
CHLORIDE SERPL-SCNC: 115 MMOL/L (ref 97–108)
CO2 SERPL-SCNC: 21 MMOL/L (ref 21–32)
CREAT SERPL-MCNC: 1.06 MG/DL (ref 0.7–1.3)
DIFFERENTIAL METHOD BLD: ABNORMAL
EOSINOPHIL # BLD: 0 K/UL (ref 0–0.4)
EOSINOPHIL NFR BLD: 0 % (ref 0–7)
ERYTHROCYTE [DISTWIDTH] IN BLOOD BY AUTOMATED COUNT: 13.2 % (ref 11.5–14.5)
GLUCOSE SERPL-MCNC: 126 MG/DL (ref 65–100)
HCT VFR BLD AUTO: 30.4 % (ref 36.6–50.3)
HGB BLD-MCNC: 10.2 G/DL (ref 12.1–17)
LYMPHOCYTES # BLD: 0.4 K/UL (ref 0.8–3.5)
LYMPHOCYTES NFR BLD: 3 % (ref 12–49)
MCH RBC QN AUTO: 30.1 PG (ref 26–34)
MCHC RBC AUTO-ENTMCNC: 33.6 G/DL (ref 30–36.5)
MCV RBC AUTO: 89.7 FL (ref 80–99)
MONOCYTES # BLD: 0.6 K/UL (ref 0–1)
MONOCYTES NFR BLD: 5 % (ref 5–13)
NEUTS BAND NFR BLD MANUAL: 2 % (ref 0–6)
NEUTS SEG # BLD: 11.1 K/UL (ref 1.8–8)
NEUTS SEG NFR BLD: 90 % (ref 32–75)
PLATELET # BLD AUTO: 315 K/UL (ref 150–400)
POTASSIUM SERPL-SCNC: 4.3 MMOL/L (ref 3.5–5.1)
RBC # BLD AUTO: 3.39 M/UL (ref 4.1–5.7)
RBC MORPH BLD: ABNORMAL
SODIUM SERPL-SCNC: 144 MMOL/L (ref 136–145)
WBC # BLD AUTO: 12.1 K/UL (ref 4.1–11.1)

## 2018-01-05 PROCEDURE — 74011250636 HC RX REV CODE- 250/636: Performed by: INTERNAL MEDICINE

## 2018-01-05 PROCEDURE — 74011250637 HC RX REV CODE- 250/637: Performed by: SPECIALIST

## 2018-01-05 PROCEDURE — 65270000032 HC RM SEMIPRIVATE

## 2018-01-05 PROCEDURE — 74011000250 HC RX REV CODE- 250: Performed by: HOSPITALIST

## 2018-01-05 PROCEDURE — 36415 COLL VENOUS BLD VENIPUNCTURE: CPT | Performed by: SPECIALIST

## 2018-01-05 PROCEDURE — 74011250637 HC RX REV CODE- 250/637: Performed by: HOSPITALIST

## 2018-01-05 PROCEDURE — 74011250636 HC RX REV CODE- 250/636: Performed by: HOSPITALIST

## 2018-01-05 PROCEDURE — 85025 COMPLETE CBC W/AUTO DIFF WBC: CPT | Performed by: SPECIALIST

## 2018-01-05 PROCEDURE — 94640 AIRWAY INHALATION TREATMENT: CPT

## 2018-01-05 PROCEDURE — 74011000258 HC RX REV CODE- 258: Performed by: INTERNAL MEDICINE

## 2018-01-05 PROCEDURE — 80048 BASIC METABOLIC PNL TOTAL CA: CPT | Performed by: SPECIALIST

## 2018-01-05 RX ORDER — GUAIFENESIN 100 MG/5ML
100 SOLUTION ORAL
Status: DISCONTINUED | OUTPATIENT
Start: 2018-01-05 | End: 2018-01-06

## 2018-01-05 RX ORDER — BUTALBITAL, ACETAMINOPHEN AND CAFFEINE 50; 325; 40 MG/1; MG/1; MG/1
1 TABLET ORAL
Status: DISCONTINUED | OUTPATIENT
Start: 2018-01-05 | End: 2018-01-09 | Stop reason: HOSPADM

## 2018-01-05 RX ADMIN — ALBUTEROL SULFATE 1.25 MG: 2.5 SOLUTION RESPIRATORY (INHALATION) at 21:19

## 2018-01-05 RX ADMIN — BUTALBITAL, ACETAMINOPHEN AND CAFFEINE 1 TABLET: 50; 325; 40 TABLET ORAL at 11:58

## 2018-01-05 RX ADMIN — Medication 10 ML: at 22:37

## 2018-01-05 RX ADMIN — DEXAMETHASONE SODIUM PHOSPHATE 8 MG: 4 INJECTION, SOLUTION INTRAMUSCULAR; INTRAVENOUS at 00:22

## 2018-01-05 RX ADMIN — VANCOMYCIN HYDROCHLORIDE 1500 MG: 10 INJECTION, POWDER, LYOPHILIZED, FOR SOLUTION INTRAVENOUS at 15:06

## 2018-01-05 RX ADMIN — HEPARIN SODIUM 5000 UNITS: 5000 INJECTION, SOLUTION INTRAVENOUS; SUBCUTANEOUS at 16:29

## 2018-01-05 RX ADMIN — GUAIFENESIN 100 MG: 200 SOLUTION ORAL at 22:36

## 2018-01-05 RX ADMIN — Medication 10 ML: at 06:49

## 2018-01-05 RX ADMIN — GUAIFENESIN 100 MG: 200 SOLUTION ORAL at 16:30

## 2018-01-05 RX ADMIN — LOSARTAN POTASSIUM 50 MG: 25 TABLET ORAL at 08:34

## 2018-01-05 RX ADMIN — HEPARIN SODIUM 5000 UNITS: 5000 INJECTION, SOLUTION INTRAVENOUS; SUBCUTANEOUS at 00:21

## 2018-01-05 RX ADMIN — CEFTRIAXONE 2 G: 2 INJECTION, POWDER, FOR SOLUTION INTRAMUSCULAR; INTRAVENOUS at 20:49

## 2018-01-05 RX ADMIN — CIPROFLOXACIN AND DEXAMETHASONE 4 DROP: 3; 1 SUSPENSION/ DROPS AURICULAR (OTIC) at 16:33

## 2018-01-05 RX ADMIN — CEFTRIAXONE 2 G: 2 INJECTION, POWDER, FOR SOLUTION INTRAMUSCULAR; INTRAVENOUS at 08:34

## 2018-01-05 RX ADMIN — SODIUM CHLORIDE 75 ML/HR: 900 INJECTION, SOLUTION INTRAVENOUS at 12:58

## 2018-01-05 RX ADMIN — SODIUM CHLORIDE 125 ML/HR: 900 INJECTION, SOLUTION INTRAVENOUS at 04:12

## 2018-01-05 RX ADMIN — ALBUTEROL SULFATE 1.25 MG: 2.5 SOLUTION RESPIRATORY (INHALATION) at 09:31

## 2018-01-05 RX ADMIN — CIPROFLOXACIN AND DEXAMETHASONE 4 DROP: 3; 1 SUSPENSION/ DROPS AURICULAR (OTIC) at 08:35

## 2018-01-05 RX ADMIN — Medication 10 ML: at 15:06

## 2018-01-05 RX ADMIN — HEPARIN SODIUM 5000 UNITS: 5000 INJECTION, SOLUTION INTRAVENOUS; SUBCUTANEOUS at 23:29

## 2018-01-05 RX ADMIN — TAMSULOSIN HYDROCHLORIDE 0.4 MG: 0.4 CAPSULE ORAL at 08:34

## 2018-01-05 RX ADMIN — PANTOPRAZOLE SODIUM 40 MG: 40 TABLET, DELAYED RELEASE ORAL at 09:34

## 2018-01-05 RX ADMIN — FLUTICASONE PROPIONATE 2 SPRAY: 50 SPRAY, METERED NASAL at 08:35

## 2018-01-05 RX ADMIN — HEPARIN SODIUM 5000 UNITS: 5000 INJECTION, SOLUTION INTRAVENOUS; SUBCUTANEOUS at 07:33

## 2018-01-05 RX ADMIN — DEXAMETHASONE SODIUM PHOSPHATE 8 MG: 4 INJECTION, SOLUTION INTRAMUSCULAR; INTRAVENOUS at 06:48

## 2018-01-05 NOTE — PROGRESS NOTES
Hospitalist Progress Note  Ariel Vilchis MD  Answering service: [phone] OR [phone] from in house phone  Cell: [phone]      Date of Service:  2018  NAME:  Ronda Pollack  :  1950  MRN:  [id number]      Admission Summary:     Patient is a 75year old male with past medical history of Hypertension, who was transferred from [facility] & [address] on account of altered mental status. He had a CT scan of the head done 18 showing opacity involving the right mastoid and middle ear cavity questioning otomastoditis. Lumbar puncture done showed wbc of 23,00, Neutrophils of 945 and CSF glu < 10    Patient was admitted for possible meningitis    Interval history / Subjective:       F/u for otomastoditis/meningitis    No acute overnight events. S/p Tympanomastoidectomy and ear tube right with placement of facial nerve electrodes. POD # 2  He feels so much better today. He is conversant and fully oriented. He says he has an headache when he coughs. He denies chest pain or SOB. No fever or chills. No visual disturbance. He N/V or abdominal pain. Cannot remember the last time he moved his bowels. Assessment & Plan:     Toxic metabolic encephalopathy: Secondary to Sepsis d/t Otomastoiditis causing meningitis as evidenced by CT scan of the head showing opacification of the right mastoid. CSF analysis also highly suggestive of meningitis. CT maxillofacial with contrast shows fluid/soft tissue opacification right middle ear and mastoid suggesting inflammation. S/p Tympanomastoidectomy and ear tube right with placement of facial nerve electrodes. POD # 1  Improving. Now oriented and conversant  Continue supportive care  Resolved. Otomastoiditis:S/p Tympanomastoidectomy and ear tube right with placement of facial nerve electrodes.  POD # 2  Cultures negative so far from mastoid area  F/u with ENT one week post discharge-  Caprice Sale.  Change cotton ball every shift in right ear. Continue ciprodex ear drops    Bacterial Meningitis; Likely due to right sided mastoiditis  blood cx from [facility]- Pneumococcus. Likely cause of meningitis. CSF culture 1/2/18 negative so far  Continue Ceftriaxone, and Vancomycin. Ampicillin discontinued. D/c Dexamethasone  He needs an HIV screen-patient consented. Obtain HIV 1/2 ag/ab. Needs Pneumovax vaccine 13 and 23 as an out-patient. I.D- Dr Eneida Marr following    Pneumococcus bacteremia: Continue current abx    Sepsis: Meets criterial on admission. Continue abx  Blood culture's no growth  Strict I's and O's    Right supra-hilar opacity: Presumed community acquired pneumonia d/t strep pneumonia. Continue Rocephin and Vancomycin  guaifenesin prn cough      Code status: Full  DVT prophylaxis: heparin  Care Plan discussed with: Patient/Family and Nurse  Disposition: TBD, Patient requires inpatient care     Hospital Problems  Never Reviewed          Codes Class Noted POA    Pneumonia ICD-10-CM: J18.9  ICD-9-CM: 397  1/2/2018 Unknown        Bacterial meningitis ICD-10-CM: G00.9  ICD-9-CM: 320.9  1/2/2018 Unknown                Review of Systems:   Pertinent items are noted in HPI. Vital Signs:    Last 24hrs VS reviewed since prior progress note. Most recent are:  Visit Vitals    /79 (BP 1 Location: Left arm, BP Patient Position: At rest)    Pulse 72    Temp 97.8 °F (36.6 °C)    Resp 20    Ht 5' 9\" (1.753 m)    Wt 89.4 kg (197 lb 1.5 oz)    SpO2 97%    BMI 29.11 kg/m2         Intake/Output Summary (Last 24 hours) at 01/05/18 1112  Last data filed at 01/04/18 1500   Gross per 24 hour   Intake             1670 ml   Output                2 ml   Net             1668 ml        Physical Examination:             Constitutional:  Acutely ill looking,  cooperative   ENT:  Oral mucous dry, oropharynx benign. Neck supple, cotton ball right ear   Resp:  CTA bilaterally.  No wheezing/rhonchi/rales. No accessory muscle use   CV:  Regular rhythm, normal rate, no murmurs, gallops, rubs    GI:  Soft, non distended, non tender. normoactive bowel sounds, no hepatosplenomegaly     Musculoskeletal:  No edema, warm, 2+ pulses throughout    Neurologic:  Moves all extremities. Awake,, lethargic and oriented to name, place and time          Data Review:          Labs:     Recent Labs      01/05/18   0305  01/04/18   0503   WBC  12.1*  16.7*   HGB  10.2*  10.8*   HCT  30.4*  32.7*   PLT  315  293     Recent Labs      01/05/18   0305  01/04/18   0503  01/03/18   0230   NA  144  143  141   K  4.3  4.0  4.0   CL  115*  114*  109*   CO2  21  20*  23   BUN  30*  34*  23*   CREA  1.06  1.03  1.18   GLU  126*  125*  129*   CA  8.0*  8.4*  8.8   MG   --   2.4   --      Recent Labs      01/03/18   0230  01/02/18   1132   SGOT  21  19   ALT  28  36   AP  104  89   TBILI  1.5*  1.9*   TP  6.6  7.0   ALB  2.4*  2.6*   GLOB  4.2*  4.4*   AML   --   120*   LPSE   --   478*     No results for input(s): INR, PTP, APTT in the last 72 hours. No lab exists for component: INREXT, INREXT   No results for input(s): FE, TIBC, PSAT, FERR in the last 72 hours. No results found for: FOL, RBCF   No results for input(s): PH, PCO2, PO2 in the last 72 hours. No results for input(s): CPK, CKNDX, TROIQ in the last 72 hours.     No lab exists for component: CPKMB  No results found for: CHOL, CHOLX, CHLST, CHOLV, HDL, LDL, LDLC, DLDLP, TGLX, TRIGL, TRIGP, CHHD, CHHDX  No results found for: GLUCPOC  No results found for: COLOR, APPRN, SPGRU, REFSG, GERARDO, PROTU, GLUCU, KETU, BILU, UROU, TRISTIN, LEUKU, GLUKE, EPSU, BACTU, WBCU, RBCU, CASTS, UCRY      Medications Reviewed:     Current Facility-Administered Medications   Medication Dose Route Frequency    [START ON 1/6/2018] Vancomycin trough 1/6 @ 0600 prior to dose admin   Other ONCE    fluticasone (FLONASE) 50 mcg/actuation nasal spray 2 Spray  2 Spray Both Nostrils DAILY    tamsulosin (FLOMAX) capsule 0.4 mg  0.4 mg Oral DAILY    losartan (COZAAR) tablet 50 mg  50 mg Oral DAILY    pantoprazole (PROTONIX) tablet 40 mg  40 mg Oral ACB    albuterol (PROVENTIL VENTOLIN) nebulizer solution 1.25 mg  1.25 mg Nebulization BID RT    ciprofloxacin-dexamethasone (CIPRODEX) 0.3-0.1 % otic suspension 4 Drop  4 Drop Right Ear BID    vancomycin (VANCOCIN) 1500 mg in  ml infusion  1,500 mg IntraVENous Q16H    sodium chloride (NS) flush 5-10 mL  5-10 mL IntraVENous Q8H    sodium chloride (NS) flush 5-10 mL  5-10 mL IntraVENous PRN    sodium chloride (NS) flush 5-10 mL  5-10 mL IntraVENous Q8H    sodium chloride (NS) flush 5-10 mL  5-10 mL IntraVENous PRN    0.9% sodium chloride infusion  125 mL/hr IntraVENous CONTINUOUS    HYDROcodone-acetaminophen (NORCO) 5-325 mg per tablet 1 Tab  1 Tab Oral Q4H PRN    HYDROmorphone (PF) (DILAUDID) injection 0.5 mg  0.5 mg IntraVENous Q4H PRN    naloxone (NARCAN) injection 0.4 mg  0.4 mg IntraVENous PRN    heparin (porcine) injection 5,000 Units  5,000 Units SubCUTAneous Q8H    haloperidol lactate (HALDOL) injection 1 mg  1 mg IntraVENous Q4H PRN    acetaminophen (TYLENOL) suppository 650 mg  650 mg Rectal Q4H PRN    cefTRIAXone (ROCEPHIN) 2 g in 0.9% sodium chloride (MBP/ADV) 50 mL  2 g IntraVENous Q12H    Vancomycin - pharmacy to dose   Other Rx Dosing/Monitoring     ______________________________________________________________________  EXPECTED LENGTH OF STAY: 4d 21h  ACTUAL LENGTH OF STAY:          3                 Orlando Mcfadden MD

## 2018-01-05 NOTE — PROGRESS NOTES
Bedside and Verbal shift change report given to Cj (oncoming nurse) by Porter Blair (offgoing nurse). Report included the following information SBAR and Kardex.

## 2018-01-05 NOTE — PROGRESS NOTES
1050: Spoke with Dr. Gillian Julien regarding patient's status. Per MD, patient no longer needs to be on bedrest and may ambulate with assistance. Patient informed and is ambulating in the hallways with family. 1445: Patient states that he would like something to help suppress his cough. No active orders noted. MD paged. 0198: Spoke with Dr. Gillian Julien who ordered Robitussin Q6 PRN.

## 2018-01-05 NOTE — PROGRESS NOTES
Problem: Falls - Risk of  Goal: *Absence of Falls  Document Lisbeth Fall Risk and appropriate interventions in the flowsheet.    Outcome: Progressing Towards Goal  Fall Risk Interventions:  Mobility Interventions: Bed/chair exit alarm, Communicate number of staff needed for ambulation/transfer    Mentation Interventions: Adequate sleep, hydration, pain control, Bed/chair exit alarm, Door open when patient unattended, Family/sitter at bedside    Medication Interventions: Bed/chair exit alarm, Patient to call before getting OOB, Teach patient to arise slowly    Elimination Interventions: Call light in reach, Bed/chair exit alarm, Patient to call for help with toileting needs

## 2018-01-05 NOTE — PROGRESS NOTES
Edward Manrique is a 75 y.o. with a history of vertigo/meniere's disease GERD and hypertension who was transferred from [facility]. He presented to the outside ER due to a change in mental status. Per records he had been sick for six days with vomiting, headache, diarrhea and altered mental status. He had been seen by a physician and started on an antibiotic for bronchitis. Per chart review workup included CXR, CT head, lumbar puncture, blood cultures and urine culture. Outside [place] [address], Lactic acid of 2.4,   LP demonstrated 2300 white cells with 94% neutrophils,  CSF glucose < 10.  no RBC an total protein >300. GS CSF had many WBC's but no organisms. June Glee He was treated with Zosyn 3.375g last night. Has received 2g of Ceftriaxone and 2g of Vancomycin this morning. He was transferred to [facility] for higher level of care. Spoke to patient 's wife - he has been feeling unwell since before [place] for the past 10 days. 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 HE started with cough and then had headache - He went to [facility] over the weekend and was given zpak and prednisone. He started developing fever the next day and was c/o headache, nausea and vomiting HE was also dizzy. He was restless the whole night and on Monday as his temp was 104 and he was taken to [facility] on 1/1/18. CT head with out contrast showed opacity involving portion of rt mastoid and middle ear cavity questioning otomastoiditis  He got a dose of zosyn and then had LP which was as above and then given ceftriaxone/vanco and transferred                 Data  this hospitalization  Date  TMAX WBC Abn labs Cultures ABX   1/2/18 101.3 20.9  Cr 1.30  T. bilirubin - 1.9, Glucose 140 Bc-NG Ctx  Ampicillin  vanco    1/3/18    23. 3          1/4/17  97.4  16.7  cr 1.03    DC ampicillin    1/5/18  N  12.1  cr 1.06                                                                 subjective  Doing much better  appetite poor       Objective:   VITALS:     Visit Vitals    /79 (BP 1 Location: Left arm, BP Patient Position: At rest)    Pulse 72    Temp 97.8 °F (36.6 °C)    Resp 20    Ht 5' 9\" (1.753 m)    Wt 197 lb 1.5 oz (89.4 kg)    SpO2 97%    BMI 29.11 kg/m2     PHYSICAL EXAM:   General:   awake  alert ,not confused  Head:                                   Normocephalic, without obvious abnormality, atraumatic. Eyes:                                   Conjunctivae clear, anicteric sclerae. Pupils are equal  Nose:                                   Nares normal. No drainage or sinus tenderness. Rt ear- surgical dressing       Lungs:                       B/l air entry  Heart:                                  s1s2   Abdomen:                  Soft, non-tender,not distended. Bowel sounds normal. No masses  Extremities:               Extremities normal, atraumatic, no cyanosis. No edema. No clubbing  Skin:                                    No rashes or lesions. Not Jaundiced  Lymph:                      Cervical, supraclavicular normal.  Neurologic:                Grossly non-focal   moves all limbs    Pertinent Labs   BC from OSH-   strep pneumo  Csf-NG     IMAGING RESULTS:     CT head  Chronic ethmoid sinusitis. Partial opacification of right mastoid air cell, question otomastoiditis.             Impression/Recommendation  74 yo male with history of hypertension and GERD who is admitted with altered mental status,headache    fever, high wbc, csf neutrophilic pleocytosis        Bacterial Meningitis  secondary to the right  otomastoiditis-  Encephalopathy resolved  Likely due to pneumococcus ( eventhough CSF culture neg)   DC steroids      S/p Right tympanomastoidectomy.   2.  Myringotomy tubes  Will need a minimum of 2 more weeks of IV ceftriaxone ( 1/17/18)  Can get PICC line if blood culture from 1/2/18 remains negative tomorrow  Will repeat another set of blood culture tomorrow    Pneumococcus bacteremia     blood culture 4 out of 4 bottles ( from [address] [facility])  is pneumococcus   Continue vanco/ceftriaxone -will be able to DC vancomycin tomorrow   2 d echo no vegetation  HIV test- patient has agreed  Will need pneumococcal vaccines 13 and 23 after completion of antibiotic    Rt pneumonia - likely due to pneumococcus       HTN- management as per primary team     Discussed with patient and

## 2018-01-05 NOTE — PROGRESS NOTES
Spiritual Care Partner Volunteer visited patient in room 622/02 on 1.05.18 . Documented by: : Rev. Peggy Elam.  Carlito Slider; [facility], to contact [phone] Jak Martin call: [phone]

## 2018-01-05 NOTE — PROGRESS NOTES
Bedside and Verbal shift change report given to Honorio Moreno RN (oncoming nurse) by MADAY Corona (offgoing nurse). Report included the following information SBAR, Kardex, MAR and Recent Results.

## 2018-01-06 LAB
ANION GAP SERPL CALC-SCNC: 9 MMOL/L (ref 5–15)
BASOPHILS # BLD: 0 K/UL (ref 0–0.1)
BASOPHILS NFR BLD: 0 % (ref 0–1)
BUN SERPL-MCNC: 30 MG/DL (ref 6–20)
BUN/CREAT SERPL: 29 (ref 12–20)
CALCIUM SERPL-MCNC: 7.7 MG/DL (ref 8.5–10.1)
CHLORIDE SERPL-SCNC: 113 MMOL/L (ref 97–108)
CO2 SERPL-SCNC: 20 MMOL/L (ref 21–32)
CREAT SERPL-MCNC: 1.05 MG/DL (ref 0.7–1.3)
DATE LAST DOSE: ABNORMAL
EOSINOPHIL # BLD: 0 K/UL (ref 0–0.4)
EOSINOPHIL NFR BLD: 0 % (ref 0–7)
ERYTHROCYTE [DISTWIDTH] IN BLOOD BY AUTOMATED COUNT: 13.2 % (ref 11.5–14.5)
GLUCOSE SERPL-MCNC: 85 MG/DL (ref 65–100)
HCT VFR BLD AUTO: 31.3 % (ref 36.6–50.3)
HGB BLD-MCNC: 10.5 G/DL (ref 12.1–17)
LYMPHOCYTES # BLD: 1.5 K/UL (ref 0.8–3.5)
LYMPHOCYTES NFR BLD: 13 % (ref 12–49)
MCH RBC QN AUTO: 30.3 PG (ref 26–34)
MCHC RBC AUTO-ENTMCNC: 33.5 G/DL (ref 30–36.5)
MCV RBC AUTO: 90.2 FL (ref 80–99)
MONOCYTES # BLD: 0.3 K/UL (ref 0–1)
MONOCYTES NFR BLD: 3 % (ref 5–13)
NEUTS SEG # BLD: 10 K/UL (ref 1.8–8)
NEUTS SEG NFR BLD: 84 % (ref 32–75)
PLATELET # BLD AUTO: 353 K/UL (ref 150–400)
POTASSIUM SERPL-SCNC: 3.8 MMOL/L (ref 3.5–5.1)
RBC # BLD AUTO: 3.47 M/UL (ref 4.1–5.7)
REPORTED DOSE,DOSE: ABNORMAL UNITS
REPORTED DOSE/TIME,TMG: ABNORMAL
SODIUM SERPL-SCNC: 142 MMOL/L (ref 136–145)
VANCOMYCIN TROUGH SERPL-MCNC: 13.9 UG/ML (ref 5–10)
WBC # BLD AUTO: 11.8 K/UL (ref 4.1–11.1)

## 2018-01-06 PROCEDURE — 85025 COMPLETE CBC W/AUTO DIFF WBC: CPT | Performed by: SPECIALIST

## 2018-01-06 PROCEDURE — 74011000258 HC RX REV CODE- 258: Performed by: INTERNAL MEDICINE

## 2018-01-06 PROCEDURE — 94640 AIRWAY INHALATION TREATMENT: CPT

## 2018-01-06 PROCEDURE — 87040 BLOOD CULTURE FOR BACTERIA: CPT | Performed by: INTERNAL MEDICINE

## 2018-01-06 PROCEDURE — 74011000250 HC RX REV CODE- 250: Performed by: HOSPITALIST

## 2018-01-06 PROCEDURE — 74011250637 HC RX REV CODE- 250/637: Performed by: HOSPITALIST

## 2018-01-06 PROCEDURE — 74011250636 HC RX REV CODE- 250/636: Performed by: INTERNAL MEDICINE

## 2018-01-06 PROCEDURE — 80048 BASIC METABOLIC PNL TOTAL CA: CPT | Performed by: SPECIALIST

## 2018-01-06 PROCEDURE — 74011250637 HC RX REV CODE- 250/637: Performed by: SPECIALIST

## 2018-01-06 PROCEDURE — 87389 HIV-1 AG W/HIV-1&-2 AB AG IA: CPT | Performed by: HOSPITALIST

## 2018-01-06 PROCEDURE — 65270000032 HC RM SEMIPRIVATE

## 2018-01-06 PROCEDURE — 36415 COLL VENOUS BLD VENIPUNCTURE: CPT | Performed by: SPECIALIST

## 2018-01-06 PROCEDURE — 80202 ASSAY OF VANCOMYCIN: CPT | Performed by: HOSPITALIST

## 2018-01-06 RX ORDER — CODEINE PHOSPHATE AND GUAIFENESIN 10; 100 MG/5ML; MG/5ML
5 SOLUTION ORAL
Status: DISCONTINUED | OUTPATIENT
Start: 2018-01-06 | End: 2018-01-09 | Stop reason: HOSPADM

## 2018-01-06 RX ADMIN — ALBUTEROL SULFATE 1.25 MG: 2.5 SOLUTION RESPIRATORY (INHALATION) at 19:36

## 2018-01-06 RX ADMIN — ALBUTEROL SULFATE 1.25 MG: 2.5 SOLUTION RESPIRATORY (INHALATION) at 10:26

## 2018-01-06 RX ADMIN — CIPROFLOXACIN AND DEXAMETHASONE 4 DROP: 3; 1 SUSPENSION/ DROPS AURICULAR (OTIC) at 19:01

## 2018-01-06 RX ADMIN — CEFTRIAXONE 2 G: 2 INJECTION, POWDER, FOR SOLUTION INTRAMUSCULAR; INTRAVENOUS at 09:57

## 2018-01-06 RX ADMIN — LOSARTAN POTASSIUM 50 MG: 25 TABLET ORAL at 09:58

## 2018-01-06 RX ADMIN — PANTOPRAZOLE SODIUM 40 MG: 40 TABLET, DELAYED RELEASE ORAL at 08:58

## 2018-01-06 RX ADMIN — FLUTICASONE PROPIONATE 2 SPRAY: 50 SPRAY, METERED NASAL at 09:59

## 2018-01-06 RX ADMIN — HEPARIN SODIUM 5000 UNITS: 5000 INJECTION, SOLUTION INTRAVENOUS; SUBCUTANEOUS at 08:58

## 2018-01-06 RX ADMIN — VANCOMYCIN HYDROCHLORIDE 1500 MG: 10 INJECTION, POWDER, LYOPHILIZED, FOR SOLUTION INTRAVENOUS at 08:45

## 2018-01-06 RX ADMIN — CEFTRIAXONE 2 G: 2 INJECTION, POWDER, FOR SOLUTION INTRAMUSCULAR; INTRAVENOUS at 22:42

## 2018-01-06 RX ADMIN — Medication 10 ML: at 07:56

## 2018-01-06 RX ADMIN — BUTALBITAL, ACETAMINOPHEN AND CAFFEINE 1 TABLET: 50; 325; 40 TABLET ORAL at 15:48

## 2018-01-06 RX ADMIN — HEPARIN SODIUM 5000 UNITS: 5000 INJECTION, SOLUTION INTRAVENOUS; SUBCUTANEOUS at 15:48

## 2018-01-06 RX ADMIN — CIPROFLOXACIN AND DEXAMETHASONE 4 DROP: 3; 1 SUSPENSION/ DROPS AURICULAR (OTIC) at 09:58

## 2018-01-06 RX ADMIN — GUAIFENESIN 100 MG: 200 SOLUTION ORAL at 04:13

## 2018-01-06 RX ADMIN — Medication 10 ML: at 22:42

## 2018-01-06 RX ADMIN — Medication 10 ML: at 14:51

## 2018-01-06 RX ADMIN — TAMSULOSIN HYDROCHLORIDE 0.4 MG: 0.4 CAPSULE ORAL at 09:58

## 2018-01-06 RX ADMIN — GUAIFENESIN AND CODEINE PHOSPHATE 5 ML: 100; 10 SOLUTION ORAL at 15:48

## 2018-01-06 NOTE — PROGRESS NOTES
Problem: Falls - Risk of  Goal: *Absence of Falls  Document Lisbeth Fall Risk and appropriate interventions in the flowsheet.    Outcome: Progressing Towards Goal  Fall Risk Interventions:  Mobility Interventions: Bed/chair exit alarm    Mentation Interventions: Door open when patient unattended    Medication Interventions: Bed/chair exit alarm    Elimination Interventions: Call light in reach, Bed/chair exit alarm

## 2018-01-06 NOTE — PROGRESS NOTES
Bedside shift change report given to [facility] [address] (oncoming nurse) by Rui Sanchez RN (offgoing nurse). Report included the following information SBAR, MAR, Recent Results and Med Rec Status.

## 2018-01-06 NOTE — PROGRESS NOTES
Bedside and Verbal shift change report given to Carri Robles (oncoming nurse) by Everette Hughes (offgoing nurse). Report included the following information SBAR and Kardex.

## 2018-01-06 NOTE — PROGRESS NOTES
Problem: Falls - Risk of  Goal: *Absence of Falls  Document Lisbeth Fall Risk and appropriate interventions in the flowsheet.    Outcome: Progressing Towards Goal  Fall Risk Interventions:  Mobility Interventions: Bed/chair exit alarm, Communicate number of staff needed for ambulation/transfer, OT consult for ADLs, Patient to call before getting OOB, PT Consult for mobility concerns    Mentation Interventions: Adequate sleep, hydration, pain control, Bed/chair exit alarm, Door open when patient unattended, Evaluate medications/consider consulting pharmacy, Reorient patient, Toileting rounds    Medication Interventions: Bed/chair exit alarm, Patient to call before getting OOB, Teach patient to arise slowly    Elimination Interventions: Bed/chair exit alarm, Call light in reach, Patient to call for help with toileting needs, Toileting schedule/hourly rounds

## 2018-01-06 NOTE — PROGRESS NOTES
Bedside shift change report given [facility] (oncoming nurse) by MADAY Corona (offgoing nurse). Report included the following information SBAR, Kardex, ED Summary, [facility] and Recent Results.

## 2018-01-06 NOTE — PROGRESS NOTES
Problem: Falls - Risk of  Goal: *Absence of Falls  Document Lisbeth Fall Risk and appropriate interventions in the flowsheet.    Outcome: Progressing Towards Goal  Fall Risk Interventions:  Mobility Interventions: Bed/chair exit alarm, Communicate number of staff needed for ambulation/transfer, Patient to call before getting OOB    Mentation Interventions: Adequate sleep, hydration, pain control, Bed/chair exit alarm, Door open when patient unattended, More frequent rounding    Medication Interventions: Bed/chair exit alarm, Patient to call before getting OOB, Teach patient to arise slowly    Elimination Interventions: Bed/chair exit alarm, Call light in reach, Toileting schedule/hourly rounds, Patient to call for help with toileting needs

## 2018-01-06 NOTE — PROGRESS NOTES
Hospitalist Progress Note  Luther Hanks MD  Answering service: [phone] OR [phone] from in house phone  Cell: [phone]      Date of Service:  2018  NAME:  Sandy Lowe  :  1950  MRN:  [id number]      Admission Summary:     Patient is a 75year old male with past medical history of Hypertension, who was transferred from [facility] & [address] on account of altered mental status. He had a CT scan of the head done 18 showing opacity involving the right mastoid and middle ear cavity questioning otomastoditis. Lumbar puncture done showed wbc of 23,00, Neutrophils of 945 and CSF glu < 10    Patient was admitted for possible meningitis    Interval history / Subjective:       F/u for otomastoditis/meningitis    No acute overnight events. S/p Tympanomastoidectomy and ear tube right with placement of facial nerve electrodes. POD # 3  He said when he coughs he has a headache. Currently rate's headache as a 10/10. Overall, feeling better     Assessment & Plan:     Toxic metabolic encephalopathy: Secondary to Sepsis d/t Otomastoiditis causing meningitis as evidenced by CT scan of the head showing opacification of the right mastoid. CSF analysis also highly suggestive of meningitis. CT maxillofacial with contrast shows fluid/soft tissue opacification right middle ear and mastoid suggesting inflammation. S/p Tympanomastoidectomy and ear tube right with placement of facial nerve electrodes. POD # 3  Improving. Now oriented and conversant  Continue supportive care  Resolved. Otomastoiditis:S/p Tympanomastoidectomy and ear tube right with placement of facial nerve electrodes. Cultures negative so far from mastoid area  F/u with ENT one week post discharge- Dr Sirena Workman. Change cotton ball every shift in right ear.   Continue ciprodex ear drops    Bacterial Meningitis; D/t pneumococcus  blood cx from [facility]- Pneumococcus. Likely cause of meningitis. CSF culture 1/2/18 negative so far  Continue Ceftriaxone, and Vancomycin. Ampicillin discontinued. D/c Dexamethasone  F/u on HIV 1/2 ag/ab. Needs Pneumovax vaccine 13 and 23 as an out-patient. Blood culture 1/2/18: no growth till date. Repeat blood culture today and Insert PICC line for long term home antibiotics  Discharge home on I.V Ceftriaxone 2 gm daily for 2 weeks - last dose 1/17/18  Start priobiotic  I.D- Dr Luis Fernando Perez following    Pneumococcus bacteremia: Continue current abx    Sepsis: Meets criterial on admission. Continue abx  Blood culture's no growth    Right supra-hilar opacity: Presumed community acquired pneumonia d/t strep pneumonia. Continue Rocephin and Vancomycin  guaifenesin prn cough      Code status: Full  DVT prophylaxis: heparin  Care Plan discussed with: Patient/Family and Nurse  Disposition: TBD, D/c home likely on Monday after PICC line insertion with home health      Hospital Problems  Never Reviewed          Codes Class Noted POA    Pneumonia ICD-10-CM: J18.9  ICD-9-CM: 983  1/2/2018 Unknown        Bacterial meningitis ICD-10-CM: G00.9  ICD-9-CM: 320.9  1/2/2018 Unknown                Review of Systems:   Pertinent items are noted in HPI. Vital Signs:    Last 24hrs VS reviewed since prior progress note. Most recent are:  Visit Vitals    /78 (BP 1 Location: Right arm, BP Patient Position: At rest)    Pulse 61    Temp 97 °F (36.1 °C)    Resp 16    Ht 5' 9\" (1.753 m)    Wt 89.4 kg (197 lb 1.5 oz)    SpO2 100%    BMI 29.11 kg/m2         Intake/Output Summary (Last 24 hours) at 01/06/18 1348  Last data filed at 01/06/18 0954   Gross per 24 hour   Intake          5791.04 ml   Output                0 ml   Net          5791.04 ml        Physical Examination:             Constitutional:  Awake and not in any obvious distress,  Cooperative but lethargic   ENT:  Oral mucous dry, oropharynx benign.  Neck supple, cotton ball right ear   Resp:  CTA bilaterally. No wheezing/rhonchi/rales. No accessory muscle use   CV:  Regular rhythm, normal rate, no murmurs, gallops, rubs    GI:  Soft, non distended, non tender. normoactive bowel sounds, no hepatosplenomegaly     Musculoskeletal:  No edema, warm, 2+ pulses throughout    Neurologic:  Moves all extremities. Awake, and oriented x 3. Data Review:          Labs:     Recent Labs      01/06/18   0643  01/05/18   0305   WBC  11.8*  12.1*   HGB  10.5*  10.2*   HCT  31.3*  30.4*   PLT  353  315     Recent Labs      01/06/18   0643  01/05/18   0305  01/04/18   0503   NA  142  144  143   K  3.8  4.3  4.0   CL  113*  115*  114*   CO2  20*  21  20*   BUN  30*  30*  34*   CREA  1.05  1.06  1.03   GLU  85  126*  125*   CA  7.7*  8.0*  8.4*   MG   --    --   2.4     No results for input(s): SGOT, GPT, ALT, AP, TBIL, TBILI, TP, ALB, GLOB, GGT, AML, LPSE in the last 72 hours. No lab exists for component: AMYP, HLPSE  No results for input(s): INR, PTP, APTT in the last 72 hours. No lab exists for component: INREXT, INREXT   No results for input(s): FE, TIBC, PSAT, FERR in the last 72 hours. No results found for: FOL, RBCF   No results for input(s): PH, PCO2, PO2 in the last 72 hours. No results for input(s): CPK, CKNDX, TROIQ in the last 72 hours.     No lab exists for component: CPKMB  No results found for: CHOL, CHOLX, CHLST, CHOLV, HDL, LDL, LDLC, DLDLP, TGLX, TRIGL, TRIGP, CHHD, CHHDX  No results found for: GLUCPOC  No results found for: COLOR, APPRN, SPGRU, REFSG, GERARDO, PROTU, GLUCU, KETU, BILU, UROU, TRISTIN, LEUKU, GLUKE, EPSU, BACTU, WBCU, RBCU, CASTS, UCRY      Medications Reviewed:     Current Facility-Administered Medications   Medication Dose Route Frequency    butalbital-acetaminophen-caffeine (FIORICET, ESGIC) -40 mg per tablet 1 Tab  1 Tab Oral Q4H PRN    guaiFENesin (ROBITUSSIN) 100 mg/5 mL oral liquid 100 mg  100 mg Oral Q6H PRN    fluticasone (FLONASE) 50 mcg/actuation nasal spray 2 Spray  2 Spray Both Nostrils DAILY    tamsulosin (FLOMAX) capsule 0.4 mg  0.4 mg Oral DAILY    losartan (COZAAR) tablet 50 mg  50 mg Oral DAILY    pantoprazole (PROTONIX) tablet 40 mg  40 mg Oral ACB    albuterol (PROVENTIL VENTOLIN) nebulizer solution 1.25 mg  1.25 mg Nebulization BID RT    ciprofloxacin-dexamethasone (CIPRODEX) 0.3-0.1 % otic suspension 4 Drop  4 Drop Right Ear BID    vancomycin (VANCOCIN) 1500 mg in  ml infusion  1,500 mg IntraVENous Q16H    sodium chloride (NS) flush 5-10 mL  5-10 mL IntraVENous Q8H    sodium chloride (NS) flush 5-10 mL  5-10 mL IntraVENous PRN    0.9% sodium chloride infusion  75 mL/hr IntraVENous CONTINUOUS    HYDROcodone-acetaminophen (NORCO) 5-325 mg per tablet 1 Tab  1 Tab Oral Q4H PRN    HYDROmorphone (PF) (DILAUDID) injection 0.5 mg  0.5 mg IntraVENous Q4H PRN    naloxone (NARCAN) injection 0.4 mg  0.4 mg IntraVENous PRN    heparin (porcine) injection 5,000 Units  5,000 Units SubCUTAneous Q8H    haloperidol lactate (HALDOL) injection 1 mg  1 mg IntraVENous Q4H PRN    cefTRIAXone (ROCEPHIN) 2 g in 0.9% sodium chloride (MBP/ADV) 50 mL  2 g IntraVENous Q12H    Vancomycin - pharmacy to dose   Other Rx Dosing/Monitoring     ______________________________________________________________________  EXPECTED LENGTH OF STAY: 10d 7h  ACTUAL LENGTH OF STAY:          [id number] [facility] MD Fernando

## 2018-01-07 LAB
ANION GAP SERPL CALC-SCNC: 9 MMOL/L (ref 5–15)
BACTERIA SPEC CULT: NORMAL
BASOPHILS # BLD: 0 K/UL (ref 0–0.1)
BASOPHILS NFR BLD: 0 % (ref 0–1)
BUN SERPL-MCNC: 22 MG/DL (ref 6–20)
BUN/CREAT SERPL: 23 (ref 12–20)
CALCIUM SERPL-MCNC: 7.3 MG/DL (ref 8.5–10.1)
CHLORIDE SERPL-SCNC: 109 MMOL/L (ref 97–108)
CO2 SERPL-SCNC: 21 MMOL/L (ref 21–32)
CREAT SERPL-MCNC: 0.96 MG/DL (ref 0.7–1.3)
DIFFERENTIAL METHOD BLD: ABNORMAL
EOSINOPHIL # BLD: 0 K/UL (ref 0–0.4)
EOSINOPHIL NFR BLD: 0 % (ref 0–7)
ERYTHROCYTE [DISTWIDTH] IN BLOOD BY AUTOMATED COUNT: 12.6 % (ref 11.5–14.5)
GLUCOSE SERPL-MCNC: 86 MG/DL (ref 65–100)
GRAM STN SPEC: NORMAL
HCT VFR BLD AUTO: 32.1 % (ref 36.6–50.3)
HGB BLD-MCNC: 10.7 G/DL (ref 12.1–17)
LYMPHOCYTES # BLD: 0.6 K/UL (ref 0.8–3.5)
LYMPHOCYTES NFR BLD: 6 % (ref 12–49)
MAGNESIUM SERPL-MCNC: 2.1 MG/DL (ref 1.6–2.4)
MCH RBC QN AUTO: 29.3 PG (ref 26–34)
MCHC RBC AUTO-ENTMCNC: 33.3 G/DL (ref 30–36.5)
MCV RBC AUTO: 87.9 FL (ref 80–99)
METAMYELOCYTES NFR BLD MANUAL: 1 %
MONOCYTES # BLD: 0.4 K/UL (ref 0–1)
MONOCYTES NFR BLD: 4 % (ref 5–13)
NEUTS BAND NFR BLD MANUAL: 1 % (ref 0–6)
NEUTS SEG # BLD: 8.9 K/UL (ref 1.8–8)
NEUTS SEG NFR BLD: 88 % (ref 32–75)
PLATELET # BLD AUTO: 340 K/UL (ref 150–400)
POTASSIUM SERPL-SCNC: 3.9 MMOL/L (ref 3.5–5.1)
RBC # BLD AUTO: 3.65 M/UL (ref 4.1–5.7)
RBC MORPH BLD: ABNORMAL
SERVICE CMNT-IMP: NORMAL
SERVICE CMNT-IMP: NORMAL
SODIUM SERPL-SCNC: 139 MMOL/L (ref 136–145)
WBC # BLD AUTO: 10 K/UL (ref 4.1–11.1)

## 2018-01-07 PROCEDURE — 85025 COMPLETE CBC W/AUTO DIFF WBC: CPT | Performed by: HOSPITALIST

## 2018-01-07 PROCEDURE — 05HB33Z INSERTION OF INFUSION DEVICE INTO RIGHT BASILIC VEIN, PERCUTANEOUS APPROACH: ICD-10-PCS | Performed by: SPECIALIST

## 2018-01-07 PROCEDURE — 74011250637 HC RX REV CODE- 250/637: Performed by: SPECIALIST

## 2018-01-07 PROCEDURE — 74011250636 HC RX REV CODE- 250/636: Performed by: INTERNAL MEDICINE

## 2018-01-07 PROCEDURE — 77030018719 HC DRSG PTCH ANTIMIC J&J -A

## 2018-01-07 PROCEDURE — 74011250637 HC RX REV CODE- 250/637: Performed by: HOSPITALIST

## 2018-01-07 PROCEDURE — 65270000032 HC RM SEMIPRIVATE

## 2018-01-07 PROCEDURE — 74011250636 HC RX REV CODE- 250/636: Performed by: HOSPITALIST

## 2018-01-07 PROCEDURE — C1751 CATH, INF, PER/CENT/MIDLINE: HCPCS

## 2018-01-07 PROCEDURE — 94640 AIRWAY INHALATION TREATMENT: CPT

## 2018-01-07 PROCEDURE — 83735 ASSAY OF MAGNESIUM: CPT | Performed by: HOSPITALIST

## 2018-01-07 PROCEDURE — 80048 BASIC METABOLIC PNL TOTAL CA: CPT | Performed by: HOSPITALIST

## 2018-01-07 PROCEDURE — 94664 DEMO&/EVAL PT USE INHALER: CPT

## 2018-01-07 PROCEDURE — 74011000250 HC RX REV CODE- 250: Performed by: HOSPITALIST

## 2018-01-07 PROCEDURE — 76937 US GUIDE VASCULAR ACCESS: CPT

## 2018-01-07 PROCEDURE — 74011000258 HC RX REV CODE- 258: Performed by: INTERNAL MEDICINE

## 2018-01-07 PROCEDURE — 77010033678 HC OXYGEN DAILY

## 2018-01-07 PROCEDURE — 36415 COLL VENOUS BLD VENIPUNCTURE: CPT | Performed by: HOSPITALIST

## 2018-01-07 RX ORDER — ALBUTEROL SULFATE 0.83 MG/ML
1.25 SOLUTION RESPIRATORY (INHALATION)
Status: DISCONTINUED | OUTPATIENT
Start: 2018-01-07 | End: 2018-01-09 | Stop reason: HOSPADM

## 2018-01-07 RX ADMIN — GUAIFENESIN AND CODEINE PHOSPHATE 5 ML: 100; 10 SOLUTION ORAL at 18:23

## 2018-01-07 RX ADMIN — ALBUTEROL SULFATE 1.25 MG: 2.5 SOLUTION RESPIRATORY (INHALATION) at 19:28

## 2018-01-07 RX ADMIN — FLUTICASONE PROPIONATE 2 SPRAY: 50 SPRAY, METERED NASAL at 09:59

## 2018-01-07 RX ADMIN — GUAIFENESIN AND CODEINE PHOSPHATE 5 ML: 100; 10 SOLUTION ORAL at 22:20

## 2018-01-07 RX ADMIN — GUAIFENESIN AND CODEINE PHOSPHATE 5 ML: 100; 10 SOLUTION ORAL at 09:56

## 2018-01-07 RX ADMIN — Medication 10 ML: at 21:10

## 2018-01-07 RX ADMIN — CEFTRIAXONE 2 G: 2 INJECTION, POWDER, FOR SOLUTION INTRAMUSCULAR; INTRAVENOUS at 10:34

## 2018-01-07 RX ADMIN — Medication 10 ML: at 13:53

## 2018-01-07 RX ADMIN — Medication 10 ML: at 07:27

## 2018-01-07 RX ADMIN — CIPROFLOXACIN AND DEXAMETHASONE 4 DROP: 3; 1 SUSPENSION/ DROPS AURICULAR (OTIC) at 18:24

## 2018-01-07 RX ADMIN — SODIUM CHLORIDE 75 ML/HR: 900 INJECTION, SOLUTION INTRAVENOUS at 15:08

## 2018-01-07 RX ADMIN — GUAIFENESIN AND CODEINE PHOSPHATE 5 ML: 100; 10 SOLUTION ORAL at 13:55

## 2018-01-07 RX ADMIN — PANTOPRAZOLE SODIUM 40 MG: 40 TABLET, DELAYED RELEASE ORAL at 07:27

## 2018-01-07 RX ADMIN — HEPARIN SODIUM 5000 UNITS: 5000 INJECTION, SOLUTION INTRAVENOUS; SUBCUTANEOUS at 15:21

## 2018-01-07 RX ADMIN — SODIUM CHLORIDE 75 ML/HR: 900 INJECTION, SOLUTION INTRAVENOUS at 00:53

## 2018-01-07 RX ADMIN — CIPROFLOXACIN AND DEXAMETHASONE 4 DROP: 3; 1 SUSPENSION/ DROPS AURICULAR (OTIC) at 09:56

## 2018-01-07 RX ADMIN — CEFTRIAXONE 2 G: 2 INJECTION, POWDER, FOR SOLUTION INTRAMUSCULAR; INTRAVENOUS at 22:09

## 2018-01-07 RX ADMIN — HEPARIN SODIUM 5000 UNITS: 5000 INJECTION, SOLUTION INTRAVENOUS; SUBCUTANEOUS at 00:50

## 2018-01-07 RX ADMIN — LOSARTAN POTASSIUM 50 MG: 25 TABLET ORAL at 09:56

## 2018-01-07 RX ADMIN — HEPARIN SODIUM 5000 UNITS: 5000 INJECTION, SOLUTION INTRAVENOUS; SUBCUTANEOUS at 07:27

## 2018-01-07 RX ADMIN — TAMSULOSIN HYDROCHLORIDE 0.4 MG: 0.4 CAPSULE ORAL at 09:56

## 2018-01-07 NOTE — PROGRESS NOTES
Pt with pneumococcal bacteremia/  Meningitis and pneumonia. Will rule out endocarditis ( Romanian syndrome)  2 d echo normal.spoke with . He will check echo films to see it is adequate. If not DAVI  Will keep him Npo after midnight,  Also pt/ot consult before discharge. Spoke to his wife and his nurse and the hospitalist  on the phone.

## 2018-01-07 NOTE — PROGRESS NOTES
Dr. Jade Medina is [facility] patient having a midline for home antibiotic treatment.

## 2018-01-07 NOTE — PROCEDURES
Midline Insertion and Progress Note    PRE-PROCEDURE VERIFICATION  Correct Procedure: yes  Correct Site:  yes  Temperature: Temp: 97.9 °F (36.6 °C), Temperature Source: Temp Source: Oral  Recent Labs      01/07/18   0637   BUN  22*   CREA  0.96   PLT  340   WBC  10.0     Allergies: Review of patient's allergies indicates no known allergies. PROCEDURE DETAIL  A Powerglide single lumen midline was started for antibiotic therapy.  The following documentation is in addition to the Midline properties in the lines/airways flowsheet :  Lot #: [id number]  Catheter Total Length: 10 (cm)  Vein Selection for Midline :right basilic  Complication Related to Insertion: none      Line is okay to use: yes    Moreno Stokes RN, BSN, [address] [facility]  Vascular [facility]

## 2018-01-07 NOTE — PROGRESS NOTES
Edward Manrique is a 75 y.o. with a history of vertigo/meniere's disease GERD and hypertension who was transferred from [facility]. He presented to the outside ER due to a change in mental status. Per records he had been sick for six days with vomiting, headache, diarrhea and altered mental status. He had been seen by a physician and started on an antibiotic for bronchitis. Per chart review workup included CXR, CT head, lumbar puncture, blood cultures and urine culture. Outside labs [place] [address], Lactic acid of 2.4,   LP demonstrated 2300 white cells with 94% neutrophils,  CSF glucose < 10.  no RBC an total protein >300. GS CSF had many WBC's but no organisms. June Glee He was treated with Zosyn 3.375g last night. Has received 2g of Ceftriaxone and 2g of Vancomycin this morning. He was transferred to [facility] for higher level of care. Spoke to patient 's wife - he has been feeling unwell since before [place] for the past 10 days. 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 HE started with cough and then had headache - He went to [facility] over the weekend and was given zpak and prednisone. He started developing fever the next day and was c/o headache, nausea and vomiting HE was also dizzy. He was restless the whole night and on Monday as his temp was 104 and he was taken to [facility] on 1/1/18. CT head with out contrast showed opacity involving portion of rt mastoid and middle ear cavity questioning otomastoiditis  He got a dose of zosyn and then had LP which was as above and then given ceftriaxone/vanco and transferred                 Data  this hospitalization  Date  TMAX WBC Abn labs Cultures ABX   1/2/18 101.3 20.9  Cr 1.30  T. bilirubin - 1.9, Glucose 140 Bc-NG Ctx  Ampicillin  vanco    1/3/18    23. 3          1/4/17  97.4  16.7  cr 1.03    DC ampicillin    1/5/18  N  12.1  cr 1.06        1/6/18  N  11.8  cr 1.05    DC vanco                                                 subjective  sleeping      Objective:   VITALS:     Visit Vitals    /85 (BP 1 Location: Right arm, BP Patient Position: At rest)    Pulse 66    Temp 97.5 °F (36.4 °C)    Resp 17    Ht 5' 9\" (1.753 m)    Wt 197 lb 1.5 oz (89.4 kg)    SpO2 97%    BMI 29.11 kg/m2     PHYSICAL EXAM:   Sleeping       Pertinent Labs   BC from OSH-   strep pneumo  Csf-NG     IMAGING RESULTS:     CT head  Chronic ethmoid sinusitis. Partial opacification of right mastoid air cell, question otomastoiditis.             Impression/Recommendation  74 yo male with history of hypertension and GERD who is admitted with altered mental status,headache    fever, high wbc, csf neutrophilic pleocytosis        Bacterial Meningitis  secondary to the right  otomastoiditis-  Encephalopathy resolved  Likely due to pneumococcus ( eventhough CSF culture neg)   DC steroids      S/p Right tympanomastoidectomy.   2.  Myringotomy tubes  Will need a minimum of 2 more weeks of IV ceftriaxone ( 1/17/18)  Can get PICC line if blood culture from 1/2/18 remains negative tomorrow  Will repeat another set of blood culture tomorrow    Pneumococcus bacteremia     blood culture 4 out of 4 bottles ( from [facility])  is pneumococcus   Continue vanco/ceftriaxone -will be able to DC vancomycin tomorrow   2 d echo no vegetation    HIV test-sent  Will need pneumococcal vaccines 13 and 23 after completion of antibiotic    Rt pneumonia - likely due to pneumococcus       HTN- management as per primary team     Discussed  With

## 2018-01-07 NOTE — PROGRESS NOTES
Problem: Falls - Risk of  Goal: *Absence of Falls  Document Lisbeth Fall Risk and appropriate interventions in the flowsheet.    Outcome: Progressing Towards Goal  Fall Risk Interventions:  Mobility Interventions: Bed/chair exit alarm, Patient to call before getting OOB    Mentation Interventions: Bed/chair exit alarm, Family/sitter at bedside    Medication Interventions: Patient to call before getting OOB    Elimination Interventions: Call light in reach

## 2018-01-07 NOTE — PROGRESS NOTES
Hospitalist Progress Note  Veronique Buckley MD  Answering service: [phone] OR [phone] from in house phone  Cell: [phone]      Date of Service:  2018  NAME:  Evelyn Macdonald  :  1950  MRN:  [id number]      Admission Summary:     Patient is a 75year old male with past medical history of Hypertension, who was transferred from [facility] & [address] on account of altered mental status. He had a CT scan of the head done 18 showing opacity involving the right mastoid and middle ear cavity questioning otomastoditis. Lumbar puncture done showed wbc of 23,00, Neutrophils of 945 and CSF glu < 10    Patient was admitted for possible meningitis    Interval history / Subjective:       F/u for otomastoditis/meningitis    No acute overnight events. S/p Tympanomastoidectomy and ear tube right with placement of facial nerve electrodes. POD # 4  Sleeping, sister at bedside. Says did not eat much for breakfast and lunch. Assessment & Plan:     Toxic metabolic encephalopathy: Secondary to Sepsis d/t Otomastoiditis causing meningitis as evidenced by CT scan of the head showing opacification of the right mastoid. CSF analysis also highly suggestive of meningitis. CT maxillofacial with contrast shows fluid/soft tissue opacification right middle ear and mastoid suggesting inflammation. S/p Tympanomastoidectomy and ear tube right with placement of facial nerve electrodes. POD # 4  Improving. Now oriented and conversant  Continue supportive care  Resolved. Otomastoiditis:S/p Tympanomastoidectomy and ear tube right with placement of facial nerve electrodes. Cultures negative so far from mastoid area  F/u with ENT one week post discharge- Dr Yuri Cage. Change cotton ball every shift in right ear. Continue ciprodex ear drops    Bacterial Meningitis; D/t pneumococcus  blood cx from [address] [place] [address]- Pneumococcus.   Likely cause of meningitis. CSF culture 1/2/18 negative so far  Continue Ceftriaxone, and Vancomycin. Ampicillin discontinued. D/c Dexamethasone  F/u on HIV 1/2 ag/ab. Needs Pneumovax vaccine 13 and 23 as an out-patient. Blood culture 1/2/18: no growth till date. Got midline on 1/7/18  Discharge home on I.V Ceftriaxone 2 gm daily for 2 weeks - last dose 1/17/18  Start priobiotic  I.D- Dr Tk Boudreaux following    Pneumococcus bacteremia: Continue current abx,  Possible DAVI tomorrow. Sepsis: Meets criterial on admission. Resolved  Continue abx  Blood culture's no growth    Right supra-hilar opacity: Presumed community acquired pneumonia d/t strep pneumonia. Continue Rocephin and Vancomycin  guaifenesin prn cough    Pt. May need OP sleep study. ENT, ID follow up. Code status: Full  DVT prophylaxis: heparin  Care Plan discussed with: Patient/Family and Nurse  Disposition: TBD, D/c home likely on Monday, pending PT/OT evaluation. Hospital Problems  Never Reviewed          Codes Class Noted POA    Pneumonia ICD-10-CM: J18.9  ICD-9-CM: 454  1/2/2018 Unknown        Bacterial meningitis ICD-10-CM: G00.9  ICD-9-CM: 320.9  1/2/2018 Unknown                Review of Systems:   Pertinent items are noted in HPI. Vital Signs:    Last 24hrs VS reviewed since prior progress note. Most recent are:  Visit Vitals    /83 (BP 1 Location: Left arm, BP Patient Position: At rest)    Pulse (!) 55    Temp 98.4 °F (36.9 °C)    Resp 18    Ht 5' 9\" (1.753 m)    Wt 89.4 kg (197 lb 1.5 oz)    SpO2 98%    BMI 29.11 kg/m2         Intake/Output Summary (Last 24 hours) at 01/07/18 1628  Last data filed at 01/07/18 1605   Gross per 24 hour   Intake             1610 ml   Output              975 ml   Net              635 ml        Physical Examination:             Constitutional:  Not in any obvious distress. ENT:  Oral mucous dry, oropharynx benign. Neck supple, cotton ball right ear   Resp:  CTA bilaterally.  No wheezing/rhonchi/rales. No accessory muscle use   CV:  Regular rhythm, normal rate, no murmurs, gallops, rubs    GI:  Soft, non distended, non tender. normoactive bowel sounds    Musculoskeletal:  No edema, warm, 2+ pulses    Neurologic:  Moves all extremities. Awake, and oriented x 3. Data Review:          Labs:     Recent Labs      01/07/18   0637  01/06/18   0643   WBC  10.0  11.8*   HGB  10.7*  10.5*   HCT  32.1*  31.3*   PLT  340  353     Recent Labs      01/07/18   0637  01/06/18   0643  01/05/18   0305   NA  139  142  144   K  3.9  3.8  4.3   CL  109*  113*  115*   CO2  21  20*  21   BUN  22*  30*  30*   CREA  0.96  1.05  1.06   GLU  86  85  126*   CA  7.3*  7.7*  8.0*   MG  2.1   --    --      No results for input(s): SGOT, GPT, ALT, AP, TBIL, TBILI, TP, ALB, GLOB, GGT, AML, LPSE in the last 72 hours. No lab exists for component: AMYP, HLPSE  No results for input(s): INR, PTP, APTT in the last 72 hours. No lab exists for component: INREXT, INREXT   No results for input(s): FE, TIBC, PSAT, FERR in the last 72 hours. No results found for: FOL, RBCF   No results for input(s): PH, PCO2, PO2 in the last 72 hours. No results for input(s): CPK, CKNDX, TROIQ in the last 72 hours.     No lab exists for component: CPKMB  No results found for: CHOL, CHOLX, CHLST, CHOLV, HDL, LDL, LDLC, DLDLP, TGLX, TRIGL, TRIGP, CHHD, CHHDX  No results found for: GLUCPOC  No results found for: COLOR, APPRN, SPGRU, REFSG, GERARDO, PROTU, GLUCU, KETU, BILU, UROU, TRISTIN, LEUKU, GLUKE, EPSU, BACTU, WBCU, RBCU, CASTS, UCRY      Medications Reviewed:     Current Facility-Administered Medications   Medication Dose Route Frequency    albuterol (PROVENTIL VENTOLIN) nebulizer solution 1.25 mg  1.25 mg Nebulization Q6H PRN    guaiFENesin-codeine (ROBITUSSIN AC) 100-10 mg/5 mL solution 5 mL  5 mL Oral Q4H PRN    butalbital-acetaminophen-caffeine (FIORICET, ESGIC) -40 mg per tablet 1 Tab  1 Tab Oral Q4H PRN    fluticasone (FLONASE) 50 mcg/actuation nasal spray 2 Spray  2 Spray Both Nostrils DAILY    tamsulosin (FLOMAX) capsule 0.4 mg  0.4 mg Oral DAILY    losartan (COZAAR) tablet 50 mg  50 mg Oral DAILY    pantoprazole (PROTONIX) tablet 40 mg  40 mg Oral ACB    ciprofloxacin-dexamethasone (CIPRODEX) 0.3-0.1 % otic suspension 4 Drop  4 Drop Right Ear BID    sodium chloride (NS) flush 5-10 mL  5-10 mL IntraVENous Q8H    sodium chloride (NS) flush 5-10 mL  5-10 mL IntraVENous PRN    0.9% sodium chloride infusion  75 mL/hr IntraVENous CONTINUOUS    HYDROcodone-acetaminophen (NORCO) 5-325 mg per tablet 1 Tab  1 Tab Oral Q4H PRN    HYDROmorphone (PF) (DILAUDID) injection 0.5 mg  0.5 mg IntraVENous Q4H PRN    naloxone (NARCAN) injection 0.4 mg  0.4 mg IntraVENous PRN    heparin (porcine) injection 5,000 Units  5,000 Units SubCUTAneous Q8H    haloperidol lactate (HALDOL) injection 1 mg  1 mg IntraVENous Q4H PRN    cefTRIAXone (ROCEPHIN) 2 g in 0.9% sodium chloride (MBP/ADV) 50 mL  2 g IntraVENous Q12H     ______________________________________________________________________  EXPECTED LENGTH OF STAY: 10d 7h  ACTUAL LENGTH OF STAY:          5                 Lucretia Rodrigez MD

## 2018-01-08 ENCOUNTER — APPOINTMENT (OUTPATIENT)
Dept: GENERAL RADIOLOGY | Age: 68
DRG: 853 | End: 2018-01-08
Attending: SPECIALIST
Payer: MEDICARE

## 2018-01-08 LAB
ARTERIAL PATENCY WRIST A: YES
BASE DEFICIT BLD-SCNC: 1 MMOL/L
BDY SITE: ABNORMAL
ERYTHROCYTE [DISTWIDTH] IN BLOOD BY AUTOMATED COUNT: 12.7 % (ref 11.5–14.5)
GAS FLOW.O2 O2 DELIVERY SYS: ABNORMAL L/MIN
GAS FLOW.O2 SETTING OXYMISER: 15 L/M
HCO3 BLD-SCNC: 23.4 MMOL/L (ref 22–26)
HCT VFR BLD AUTO: 33.1 % (ref 36.6–50.3)
HGB BLD-MCNC: 11.2 G/DL (ref 12.1–17)
HIV 1+2 AB+HIV1 P24 AG SERPL QL IA: NONREACTIVE
HIV12 RESULT COMMENT, HHIVC: NORMAL
MCH RBC QN AUTO: 29.9 PG (ref 26–34)
MCHC RBC AUTO-ENTMCNC: 33.8 G/DL (ref 30–36.5)
MCV RBC AUTO: 88.3 FL (ref 80–99)
O2/TOTAL GAS SETTING VFR VENT: 100 %
PCO2 BLD: 34.8 MMHG (ref 35–45)
PH BLD: 7.44 [PH] (ref 7.35–7.45)
PLATELET # BLD AUTO: 371 K/UL (ref 150–400)
PO2 BLD: 260 MMHG (ref 80–100)
RBC # BLD AUTO: 3.75 M/UL (ref 4.1–5.7)
SAO2 % BLD: 100 % (ref 92–97)
SPECIMEN TYPE: ABNORMAL
WBC # BLD AUTO: 9.8 K/UL (ref 4.1–11.1)

## 2018-01-08 PROCEDURE — 85027 COMPLETE CBC AUTOMATED: CPT | Performed by: HOSPITALIST

## 2018-01-08 PROCEDURE — 36415 COLL VENOUS BLD VENIPUNCTURE: CPT | Performed by: HOSPITALIST

## 2018-01-08 PROCEDURE — 74011000258 HC RX REV CODE- 258: Performed by: INTERNAL MEDICINE

## 2018-01-08 PROCEDURE — 74011250637 HC RX REV CODE- 250/637: Performed by: SPECIALIST

## 2018-01-08 PROCEDURE — 82375 ASSAY CARBOXYHB QUANT: CPT | Performed by: INTERNAL MEDICINE

## 2018-01-08 PROCEDURE — 99152 MOD SED SAME PHYS/QHP 5/>YRS: CPT

## 2018-01-08 PROCEDURE — 65610000006 HC RM INTENSIVE CARE

## 2018-01-08 PROCEDURE — 74011250636 HC RX REV CODE- 250/636: Performed by: INTERNAL MEDICINE

## 2018-01-08 PROCEDURE — 71045 X-RAY EXAM CHEST 1 VIEW: CPT

## 2018-01-08 PROCEDURE — 74011250636 HC RX REV CODE- 250/636: Performed by: SPECIALIST

## 2018-01-08 PROCEDURE — 74011250637 HC RX REV CODE- 250/637: Performed by: HOSPITALIST

## 2018-01-08 PROCEDURE — 97161 PT EVAL LOW COMPLEX 20 MIN: CPT

## 2018-01-08 PROCEDURE — 97165 OT EVAL LOW COMPLEX 30 MIN: CPT

## 2018-01-08 PROCEDURE — 97530 THERAPEUTIC ACTIVITIES: CPT

## 2018-01-08 PROCEDURE — 36600 WITHDRAWAL OF ARTERIAL BLOOD: CPT

## 2018-01-08 PROCEDURE — 74011250636 HC RX REV CODE- 250/636: Performed by: HOSPITALIST

## 2018-01-08 PROCEDURE — 82803 BLOOD GASES ANY COMBINATION: CPT

## 2018-01-08 PROCEDURE — 96374 THER/PROPH/DIAG INJ IV PUSH: CPT

## 2018-01-08 RX ORDER — MIDAZOLAM HYDROCHLORIDE 1 MG/ML
.5-1 INJECTION, SOLUTION INTRAMUSCULAR; INTRAVENOUS
Status: DISCONTINUED | OUTPATIENT
Start: 2018-01-08 | End: 2018-01-08 | Stop reason: HOSPADM

## 2018-01-08 RX ORDER — SODIUM CHLORIDE 0.9 % (FLUSH) 0.9 %
10 SYRINGE (ML) INJECTION AS NEEDED
Status: DISCONTINUED | OUTPATIENT
Start: 2018-01-08 | End: 2018-01-08 | Stop reason: HOSPADM

## 2018-01-08 RX ORDER — ATROPINE SULFATE 0.1 MG/ML
1 INJECTION INTRAVENOUS AS NEEDED
Status: DISCONTINUED | OUTPATIENT
Start: 2018-01-08 | End: 2018-01-08 | Stop reason: HOSPADM

## 2018-01-08 RX ORDER — FENTANYL CITRATE 50 UG/ML
25-200 INJECTION, SOLUTION INTRAMUSCULAR; INTRAVENOUS
Status: DISCONTINUED | OUTPATIENT
Start: 2018-01-08 | End: 2018-01-08 | Stop reason: HOSPADM

## 2018-01-08 RX ADMIN — Medication 10 ML: at 17:32

## 2018-01-08 RX ADMIN — Medication 10 ML: at 21:24

## 2018-01-08 RX ADMIN — SODIUM CHLORIDE 75 ML/HR: 900 INJECTION, SOLUTION INTRAVENOUS at 05:10

## 2018-01-08 RX ADMIN — CIPROFLOXACIN AND DEXAMETHASONE 4 DROP: 3; 1 SUSPENSION/ DROPS AURICULAR (OTIC) at 09:07

## 2018-01-08 RX ADMIN — SODIUM CHLORIDE 75 ML/HR: 900 INJECTION, SOLUTION INTRAVENOUS at 19:19

## 2018-01-08 RX ADMIN — BUTALBITAL, ACETAMINOPHEN AND CAFFEINE 1 TABLET: 50; 325; 40 TABLET ORAL at 17:31

## 2018-01-08 RX ADMIN — FENTANYL CITRATE 50 MCG: 50 INJECTION, SOLUTION INTRAMUSCULAR; INTRAVENOUS at 11:45

## 2018-01-08 RX ADMIN — CEFTRIAXONE 2 G: 2 INJECTION, POWDER, FOR SOLUTION INTRAMUSCULAR; INTRAVENOUS at 21:24

## 2018-01-08 RX ADMIN — HEPARIN SODIUM 5000 UNITS: 5000 INJECTION, SOLUTION INTRAVENOUS; SUBCUTANEOUS at 17:31

## 2018-01-08 RX ADMIN — METHYLENE BLUE 91 MG: 5 INJECTION INTRAVENOUS at 19:28

## 2018-01-08 RX ADMIN — Medication 10 ML: at 06:00

## 2018-01-08 RX ADMIN — MIDAZOLAM HYDROCHLORIDE 1 MG: 1 INJECTION, SOLUTION INTRAMUSCULAR; INTRAVENOUS at 11:45

## 2018-01-08 RX ADMIN — FLUTICASONE PROPIONATE 2 SPRAY: 50 SPRAY, METERED NASAL at 09:07

## 2018-01-08 RX ADMIN — CIPROFLOXACIN AND DEXAMETHASONE 4 DROP: 3; 1 SUSPENSION/ DROPS AURICULAR (OTIC) at 21:24

## 2018-01-08 RX ADMIN — CEFTRIAXONE 2 G: 2 INJECTION, POWDER, FOR SOLUTION INTRAMUSCULAR; INTRAVENOUS at 09:06

## 2018-01-08 RX ADMIN — MIDAZOLAM HYDROCHLORIDE 1 MG: 1 INJECTION, SOLUTION INTRAMUSCULAR; INTRAVENOUS at 11:48

## 2018-01-08 NOTE — PROGRESS NOTES
DAVI complete  Mild MR and AR  Normal EF  No vegetations on any of 4 valves  Stable in procedure    Went to recovery and I spoke to family of results    Now called by staff in RR that he is awake , HR 75 stable Bp but sats 88-92%  I am now out of hospital but will have Cardiac team take a look, likely just recovery from sedation

## 2018-01-08 NOTE — PROGRESS NOTES
Patient brought to Procedure area for DAVI and arrived back to Recovery around 1230pm patient noted to have ashen skin color and low saturation levels of 88-89% on fingers then Pulse Ox moved to ear results where 91% patient having no other symptoms . At approximately 1315 Dr. Hermann Dwyer called to report about skin color and low Sats and he would send [facility] after 15 or 20 minutes [facility] called and came in to see patient . Orders given for Chest xray port and ABG's which where done . ABG's where normal . That when we find he has a rare allergic reaction to the spray used to do the DAVI. We are unable to get normal pulse ox reading but pulse ox readings are normal...

## 2018-01-08 NOTE — PROGRESS NOTES
Hospitalist Progress Note  Yann Quispe MD  Answering service: [phone] OR [phone] from in house phone  Cell: [phone]      Date of Service:  2018  NAME:  Richard Piña  :  1950  MRN:  [id number]      Admission Summary:     Patient is a 75year old male with past medical history of Hypertension, who was transferred from [facility] & [address] on account of altered mental status. He had a CT scan of the head done 18 showing opacity involving the right mastoid and middle ear cavity questioning otomastoditis. Lumbar puncture done showed wbc of 23,00, Neutrophils of 945 and CSF glu < 10    Patient was admitted for possible meningitis    Interval history / Subjective:       F/u for otomastoditis/meningitis    No acute overnight events. S/p Tympanomastoidectomy and ear tube right with placement of facial nerve electrodes. POD # 5  Sleeping, sister at bedside. Says did not eat much for breakfast and lunch. Had DAVI , no vegetations. Looks like he became hypoxic, cyanotic after the procedure, ?methhemoglobinemia sec to anaesthetic spray. Appreciate pulm input, pending co-oximetry results. Assessment & Plan:     Toxic metabolic encephalopathy: Secondary to Sepsis d/t Otomastoiditis causing meningitis as evidenced by CT scan of the head showing opacification of the right mastoid. CSF analysis also highly suggestive of meningitis. CT maxillofacial with contrast shows fluid/soft tissue opacification right middle ear and mastoid suggesting inflammation. S/p Tympanomastoidectomy and ear tube right with placement of facial nerve electrodes. POD # 5  Improving. Now oriented and conversant  Continue supportive care  Resolved. 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 Appreciate pulm input, pending co-oximetry results.      Otomastoiditis:S/p Tympanomastoidectomy and ear tube right with placement of facial nerve electrodes. Cultures negative so far from mastoid area  F/u with ENT one week post discharge- Dr Kaya Meza. Change cotton ball every shift in right ear. Continue ciprodex ear drops    Bacterial Meningitis; D/t pneumococcus  blood cx from [address] [facility]- Pneumococcus. Likely cause of meningitis. CSF culture 1/2/18 negative so far  Continue Ceftriaxone, and Vancomycin. Ampicillin discontinued. D/c Dexamethasone  F/u on HIV 1/2 ag/ab. Needs Pneumovax vaccine 13 and 23 as an out-patient. Blood culture 1/2/18: no growth till date. Got midline on 1/7/18  Discharge home on I.V Ceftriaxone 2 gm daily for 2 weeks - last dose 1/17/18  Start priobiotic  I.D- Dr Nereida Dolan following    Pneumococcus bacteremia: Continue current abx,  DAVI, no vegetations. Sepsis: Meets criterial on admission. Resolved  Continue abx  Blood culture's no growth    Right supra-hilar opacity: Presumed community acquired pneumonia d/t strep pneumonia. Continue Rocephin and Vancomycin  guaifenesin prn cough    Pt. May need OP sleep study. ENT, ID follow up. Code status: Full  DVT prophylaxis: heparin  Care Plan discussed with: Patient/Family and Nurse  Disposition: TBD, D/c home likely on Monday, pending PT/OT evaluation. Hospital Problems  Never Reviewed          Codes Class Noted POA    Pneumonia ICD-10-CM: J18.9  ICD-9-CM: 710  1/2/2018 Unknown        Bacterial meningitis ICD-10-CM: G00.9  ICD-9-CM: 320.9  1/2/2018 Unknown                Review of Systems:   Pertinent items are noted in HPI. Vital Signs:    Last 24hrs VS reviewed since prior progress note.  Most recent are:  Visit Vitals    /69 (BP 1 Location: Left arm, BP Patient Position: At rest)    Pulse 80    Temp 98.5 °F (36.9 °C)    Resp 12    Ht 5' 8\" (1.727 m)    Wt 91 kg (200 lb 9.6 oz)    SpO2 91%    BMI 30.5 kg/m2         Intake/Output Summary (Last 24 hours) at 01/08/18 9066  Last data filed at 01/08/18 1545   Gross per 24 hour   Intake 180 ml   Output             2425 ml   Net            -2245 ml        Physical Examination:     Evaluated prior to DAVI. Constitutional:  Not in any obvious distress. ENT:  Oral mucous dry, oropharynx benign. Neck supple, cotton ball right ear   Resp:  CTA bilaterally. No wheezing/rhonchi/rales. No accessory muscle use   CV:  Regular rhythm, normal rate, no murmurs, gallops, rubs    GI:  Soft, non distended, non tender. normoactive bowel sounds    Musculoskeletal:  No edema, warm, 2+ pulses    Neurologic:  Moves all extremities. Awake, and oriented x 3. Data Review:          Labs:     Recent Labs      01/08/18   0308  01/07/18   0637   WBC  9.8  10.0   HGB  11.2*  10.7*   HCT  33.1*  32.1*   PLT  371  340     Recent Labs      01/07/18   0637  01/06/18   0643   NA  139  142   K  3.9  3.8   CL  109*  113*   CO2  21  20*   BUN  22*  30*   CREA  0.96  1.05   GLU  86  85   CA  7.3*  7.7*   MG  2.1   --      No results for input(s): SGOT, GPT, ALT, AP, TBIL, TBILI, TP, ALB, GLOB, GGT, AML, LPSE in the last 72 hours. No lab exists for component: AMYP, HLPSE  No results for input(s): INR, PTP, APTT in the last 72 hours. No lab exists for component: INREXT, INREXT   No results for input(s): FE, TIBC, PSAT, FERR in the last 72 hours. No results found for: FOL, RBCF   No results for input(s): PH, PCO2, PO2 in the last 72 hours. No results for input(s): CPK, CKNDX, TROIQ in the last 72 hours.     No lab exists for component: CPKMB  No results found for: CHOL, CHOLX, CHLST, CHOLV, HDL, LDL, LDLC, DLDLP, TGLX, TRIGL, TRIGP, CHHD, CHHDX  No results found for: GLUCPOC  No results found for: COLOR, APPRN, SPGRU, REFSG, GERARDO, PROTU, GLUCU, KETU, BILU, UROU, TRISTIN, LEUKU, GLUKE, EPSU, BACTU, WBCU, RBCU, CASTS, UCRY      Medications Reviewed:     Current Facility-Administered Medications   Medication Dose Route Frequency    albuterol (PROVENTIL VENTOLIN) nebulizer solution 1.25 mg  1.25 mg Nebulization Q6H PRN    guaiFENesin-codeine (ROBITUSSIN AC) 100-10 mg/5 mL solution 5 mL  5 mL Oral Q4H PRN    butalbital-acetaminophen-caffeine (FIORICET, ESGIC) -40 mg per tablet 1 Tab  1 Tab Oral Q4H PRN    fluticasone (FLONASE) 50 mcg/actuation nasal spray 2 Spray  2 Spray Both Nostrils DAILY    tamsulosin (FLOMAX) capsule 0.4 mg  0.4 mg Oral DAILY    losartan (COZAAR) tablet 50 mg  50 mg Oral DAILY    pantoprazole (PROTONIX) tablet 40 mg  40 mg Oral ACB    ciprofloxacin-dexamethasone (CIPRODEX) 0.3-0.1 % otic suspension 4 Drop  4 Drop Right Ear BID    sodium chloride (NS) flush 5-10 mL  5-10 mL IntraVENous Q8H    sodium chloride (NS) flush 5-10 mL  5-10 mL IntraVENous PRN    0.9% sodium chloride infusion  75 mL/hr IntraVENous CONTINUOUS    HYDROcodone-acetaminophen (NORCO) 5-325 mg per tablet 1 Tab  1 Tab Oral Q4H PRN    HYDROmorphone (PF) (DILAUDID) injection 0.5 mg  0.5 mg IntraVENous Q4H PRN    naloxone (NARCAN) injection 0.4 mg  0.4 mg IntraVENous PRN    heparin (porcine) injection 5,000 Units  5,000 Units SubCUTAneous Q8H    haloperidol lactate (HALDOL) injection 1 mg  1 mg IntraVENous Q4H PRN    cefTRIAXone (ROCEPHIN) 2 g in 0.9% sodium chloride (MBP/ADV) 50 mL  2 g IntraVENous Q12H     ______________________________________________________________________  EXPECTED LENGTH OF STAY: 10d 7h  ACTUAL LENGTH OF STAY:          6                 Mal Pereira MD

## 2018-01-08 NOTE — PROGRESS NOTES
Cardiac Cath Lab Procedure Area Arrival Note:    Liyah Taylor arrived to Cardiac Cath Lab, Procedure Area. Patient identifiers verified with NAME and DATE OF BIRTH. Procedure verified with patient. Consent forms verified. Allergies verified. Patient informed of procedure and plan of care. Questions answered with review. Patient voiced understanding of procedure and plan of care. Patient on cardiac monitor, non-invasive blood pressure, SPO2 monitor. On   or O2 @ 2 lpm via NC.  IV of NS on pump at 25 ml/hr. Patient status doing well without problems. Patient is A&Ox 4. Patient reports no pain. Patient medicated during procedure with orders obtained and verified by Dr. Freida Clement.    Refer to patients Cardiac Cath Lab PROCEDURE REPORT for vital signs, assessment, status, and response during procedure, printed at end of case. Printed report on chart or scanned into chart.

## 2018-01-08 NOTE — PROGRESS NOTES
12:23 PM  Patient reviewed with Attending. CM received a consult for IV Antibiotics and RN needs. Patient resides in [address] CM submitted a referral to [employer] to review and check for insurance coverage. CM submitted order, H&P, ID Progress Notes, and Mid Line Note for review. CM currently awaiting receipt of referral.  CM to follow-up with [facility] to see if they can accommodate patient for RN needs at discharge. CM will continue to follow and assist with disposition needs as they arise. 2:18 PM  CM received updates from [employer] via [employer]. Agency is able to accept and can accommodate patient for services. Patient will have a co-pay responsibility for services. Patient has [employer] part D only. His cost would be $37.63 per day for ceftriaxone Iv therapy. [employer] to review with patient. CM awaiting updates from [facility] on wether or not they can accommodate patient for services at this. CM will continue to follow and assist with disposition needs as they arise. MADALYN Fisher/CRM    15:30 p.m.- CM notified that patient transferred to ICU due to breathing difficulties- CM will continue to follow.

## 2018-01-08 NOTE — PROGRESS NOTES
TRANSFER - OUT REPORT:    Verbal report given to [facility] RN on Adam's being transferred to ICU 7110 for routine progression of care       Report consisted of patients Situation, Background, Assessment and   Recommendations(SBAR). Information from the following report(s) Kardex and Procedure Summary was reviewed with the receiving nurse. Opportunity for questions and clarification was provided.

## 2018-01-08 NOTE — PROGRESS NOTES
physical Therapy EVALUATION/DISCHARGE  Patient: Donny Noriega (66 y.o. male)  Date: 1/8/2018  Primary Diagnosis: Bacterial meningitis  Pneumonia  unknown  Procedure(s) (LRB):  TYMPANOMASTOIDECTOMY AND EAR TUBES- RIGHT; PLACEMENT OF FACIAL NERVE ELECTRODES (Right) 5 Days Post-Op   Precautions:      ASSESSMENT :  Based on the objective data described below, the patient presents with near baseline mobility demonstrating safe bed  Mobility, sit to stand and gait with supervision. .    Skilled physical therapy is not indicated at this time. PLAN :  Discharge Recommendations: None  Further Equipment Recommendations for Discharge: none     SUBJECTIVE:   Patient stated I am feeling better today.     OBJECTIVE DATA SUMMARY:   HISTORY:    Past Medical History:   Diagnosis Date    GERD (gastroesophageal reflux disease)     Hypertension    History reviewed. No pertinent surgical history. Prior Level of Function/Home Situation: independent  Personal factors and/or comorbidities impacting plan of care:     Home Situation  Home Environment: Private residence  One/Two Story Residence: One story  Living Alone: No  Support Systems: Family member(s)  Patient Expects to be Discharged to[de-identified] Private residence  Current DME Used/Available at Home: None    EXAMINATION/PRESENTATION/DECISION MAKING:   Critical Behavior:  Neurologic State: Alert  Orientation Level: Oriented X4  Cognition: Appropriate for age attention/concentration, Follows commands, Appropriate safety awareness  Safety/Judgement: Awareness of environment, Fall prevention, Insight into deficits  Hearing: Auditory  Auditory Impairment: None  Skin:  See nursing notes  Edema: none  Range Of Motion:  AROM: Within functional limits           PROM: Within functional limits           Strength:    Strength:  Within functional limits                    Tone & Sensation:   Tone: Normal              Sensation: Intact               Coordination:  Coordination: Within functional limits  Vision:   Tracking: Able to track stimulus in all quadrants w/o difficulty  Acuity: Within Defined Limits  Functional Mobility:  Bed Mobility:     Supine to Sit: Independent  Sit to Supine: Independent  Scooting: Independent  Transfers:  Sit to Stand: Independent  Stand to Sit: Independent         Balance:   Sitting: Intact  Standing: Impaired  Standing - Static: Good  Standing - Dynamic : Good  Ambulation/Gait Training:  Distance (ft): 200 Feet (ft)  Assistive Device: Gait belt  Ambulation - Level of Assistance: Supervision     Gait Description (WDL): Exceptions to WDL  Gait Abnormalities: Path deviations              Stairs:  Number of Stairs Trained: 12  Stairs - Level of Assistance: Supervision   Rail Use: Right   Functional Measure:  Tinetti test:    Sitting Balance: 1  Arises: 1  Attempts to Rise: 2  Immediate Standing Balance: 2  Standing Balance: 2  Nudged: 2  Eyes Closed: 1  Turn 360 Degrees - Continuous/Discontinuous: 1  Turn 360 Degrees - Steady/Unsteady: 1  Sitting Down: 2  Balance Score: 15  Indication of Gait: 1  R Step Length/Height: 1  L Step Length/Height: 1  R Foot Clearance: 1  L Foot Clearance: 1  Step Symmetry: 1  Step Continuity: 1  Path: 1  Trunk: 2  Walking Time: 1  Gait Score: 11  Total Score: 26       Tinetti Test and G-code impairment scale:  Percentage of Impairment CH    0%   CI    1-19% CJ    20-39% CK    40-59% CL    60-79% CM    80-99% CN     100%   Tinetti  Score 0-28 28 23-27 17-22 12-16 6-11 1-5 0       Tinetti Tool Score Risk of Falls  <19 = High Fall Risk  19-24 = Moderate Fall Risk  25-28 = Low Fall Risk  Tinetti ME. Performance-Oriented Assessment of Mobility Problems in Elderly Patients. Rodriguez 65; [id number].  (Scoring Description: PT Bulletin Feb. 10, 1993)    Older adults: Aracelis Dietz et al, 2009; n = [address] elderly evaluated with ABC, BRAXTON, ADL, and IADL)  · Mean BRAXTON score for males aged 69-68 years = 26.21(3.40)  · Mean BRAXTON score for females age 69-68 years = 25.16(4.30)  · Mean BRAXTON score for males over 80 years = 23.29(6.02)  · Mean BRAXTON score for females over 80 years = 17.20(8.32)         G codes: In compliance with CMSs Claims Based Outcome Reporting, the following G-code set was chosen for this patient based on their primary functional limitation being treated: The outcome measure chosen to determine the severity of the functional limitation was the Tinetti with a score of 26/28 which was correlated with the impairment scale. ? Mobility - Walking and Moving Around:     - CURRENT STATUS: CI - 1%-19% impaired, limited or restricted    - GOAL STATUS: CI - 1%-19% impaired, limited or restricted    - D/C STATUS:  CI - 1%-19% impaired, limited or restricted        Physical Therapy Evaluation Charge Determination   History Examination Presentation Decision-Making   LOW Complexity : Zero comorbidities / personal factors that will impact the outcome / POC LOW Complexity : 1-2 Standardized tests and measures addressing body structure, function, activity limitation and / or participation in recreation  LOW Complexity : Stable, uncomplicated  LOW Complexity : FOTO score of       Based on the above components, the patient evaluation is determined to be of the following complexity level: LOW     Pain:   none      After treatment:   []   Patient left in no apparent distress sitting up in chair  [x]   Patient left in no apparent distress in bed  [x]   Call bell left within reach  [x]   Nursing notified  []   Caregiver present  [x]   Bed alarm activated    COMMUNICATION/EDUCATION:   Communication/Collaboration:  []   Fall prevention education was provided and the patient/caregiver indicated understanding. [x]   Patient/family have participated as able and agree with findings and recommendations. []   Patient is unable to participate in plan of care at this time.   Findings and recommendations were discussed with: Registered Nurse    Thank you for this referral.  Karina Lawrence, PT   Time Calculation: 12 mins

## 2018-01-08 NOTE — PROGRESS NOTES
PCCM    Called to see patient for post procedural hypoxemia. Full note to follow  S/P DAVI and use of topical anesthetic  Seen and examined.  Denies SOB or chest pain  VSS and ABG with adequate gas exchange  Co-oximetry for suspected metHb is pending  Given that he is asymptomatic, if MetHb is < 20% >> observation would be recommended  If more than 20%, further recommendations to follow  RT informed of need for stat Co-oximetry    D/W cardiology and RN    Juan Peralta MD

## 2018-01-08 NOTE — PROGRESS NOTES
TRANSFER - OUT REPORT:    Verbal report given to [facility], RN (name) on [facility]  being transferred to ICU (unit) for change in patient condition(allergic reaction to spray used in DAVI)       Report consisted of patients Situation, Background, Assessment and   Recommendations(SBAR). Information from the following report(s) SBAR, Kardex, [facility] and Recent Results was reviewed with the receiving nurse. Lines:   Peripheral IV 01/06/18 Left Wrist (Active)   Site Assessment Clean, dry, & intact 1/8/2018  9:00 AM   Phlebitis Assessment 0 1/8/2018  9:00 AM   Infiltration Assessment 0 1/8/2018  9:00 AM   Dressing Status Clean, dry, & intact 1/8/2018  9:00 AM   Dressing Type Transparent 1/7/2018  9:10 PM   Hub Color/Line Status Infusing 1/7/2018  9:10 PM   Action Taken Open ports on tubing capped 1/7/2018  9:10 PM   Alcohol Cap Used Yes 1/7/2018  9:10 PM        Opportunity for questions and clarification was provided.

## 2018-01-08 NOTE — PROGRESS NOTES
Problem: Self Care Deficits Care Plan (Adult)  Goal: *Acute Goals and Plan of Care (Insert Text)  Occupational Therapy Goals  Initiated 1/8/2018  1. Patient will perform ADLs standing 5 mins without fatigue or LOB with independence within 7 day(s). 2.  Patient will perform lower body ADLs with independence within 7 day(s). 3.  Patient will perform bathing with independence within 7 day(s). 4.  Patient will perform toilet transfers with independence within 7 day(s). 5.  Patient will perform all aspects of toileting with independence within 7 day(s). 6.  Patient will participate in upper extremity therapeutic exercise/activities to increase independence with ADLs with independence for 5 minutes within 7 day(s). Occupational Therapy EVALUATION  Patient: Rodrigue Thurman (32 y.o. male)  Date: 1/8/2018  Primary Diagnosis: Bacterial meningitis  Pneumonia  unknown  Procedure(s) (LRB):  TYMPANOMASTOIDECTOMY AND EAR TUBES- RIGHT; PLACEMENT OF FACIAL NERVE ELECTRODES (Right) 5 Days Post-Op   Precautions: Fall       ASSESSMENT :  Based on the objective data described below, the patient presents with overall supervision-CGA x1 for functional mobility and ADLs s/p meningitis with tympanomastoidectomy with ear tube placement POD 5. Patient received supine in bed with wife and multiple visitors present. Patient and family reporting he is feeling much better today, was functioning in \"slow motion\" over last few days, but appearing to be more like himself this morning. VSS, O2 saturation >95% on RA, HR 60-80s, /83 after ambulation. Patient able to complete bed mobility without assist, ambulated in hallway with supervision, and completed lower body ADLs seated EOB. Patient without complaint of dizziness, SOB, or weakness throughout knees; generally deconditioned. Patient without any further OT needs at discharge, but will benefit from HHPT or outpatient PT - will defer to PT. Spoke with RN.  Will continue to follow 3x/week to maximize activity tolerance and endurance. Patient will benefit from skilled intervention to address the above impairments. Patients rehabilitation potential is considered to be Good  Factors which may influence rehabilitation potential include:   []             None noted  []             Mental ability/status  []             Medical condition  []             Home/family situation and support systems  []             Safety awareness  []             Pain tolerance/management  []             Other:      PLAN :  Recommendations and Planned Interventions:  [x]               Self Care Training                  [x]        Therapeutic Activities  [x]               Functional Mobility Training    []        Cognitive Retraining  [x]               Therapeutic Exercises           [x]        Endurance Activities  [x]               Balance Training                   []        Neuromuscular Re-Education  []               Visual/Perceptual Training     [x]   Home Safety Training  [x]               Patient Education                 [x]        Family Training/Education  []               Other (comment):    Frequency/Duration: Patient will be followed by occupational therapy 3 times a week to address goals. Discharge Recommendations: Likely HHPT vs outpatient PT  Further Equipment Recommendations for Discharge: TBD     SUBJECTIVE:   Patient stated I'm feeling much better. I was in slow motion the last few days.     OBJECTIVE DATA SUMMARY:   HISTORY:   Past Medical History:   Diagnosis Date    GERD (gastroesophageal reflux disease)     Hypertension    History reviewed. No pertinent surgical history. Prior Level of Function/Environment/Context: Per patient report, lives at home with wife. Patient is independent at baseline, former , works in construction now. Drives. Active.     Home Situation  Home Environment: Private residence  One/Two Story Residence: One story  Living Alone: No  Support Systems: Family member(s)  Patient Expects to be Discharged to[de-identified] Private residence  Current DME Used/Available at Home: None  []  Right hand dominant   []  Left hand dominant    EXAMINATION OF PERFORMANCE DEFICITS:  Cognitive/Behavioral Status:  Neurologic State: Alert  Orientation Level: Oriented X4  Cognition: Appropriate for age attention/concentration; Follows commands; Appropriate safety awareness  Perception: Appears intact  Perseveration: No perseveration noted  Safety/Judgement: Awareness of environment; Fall prevention; Insight into deficits    Skin: Appears intact    Edema: None noted in BUEs    Hearing: Auditory  Auditory Impairment: None    Vision/Perceptual:    Tracking: Able to track stimulus in all quadrants w/o difficulty                      Acuity: Within Defined Limits         Range of Motion:  In BUEs  AROM: Within functional limits  PROM: Within functional limits                      Strength: In BUEs  Strength: Within functional limits                Coordination:  Coordination: Within functional limits  Fine Motor Skills-Upper: Left Intact; Right Intact    Gross Motor Skills-Upper: Left Intact; Right Intact    Tone & Sensation:  In BUEs  Tone: Normal  Sensation: Intact                      Balance:  Sitting: Intact  Standing: Impaired  Standing - Static: Good  Standing - Dynamic : Fair    Functional Mobility and Transfers for ADLs:  Bed Mobility:  Supine to Sit: Independent  Sit to Supine: Independent  Scooting: Independent    Transfers:  Sit to Stand: Contact guard assistance  Stand to Sit: Supervision  Toilet Transfer : Supervision (Inferred per obs of functional mobility)    ADL Assessment:  Feeding: Independent    Oral Facial Hygiene/Grooming: Independent    Bathing: Stand-by assistance    Upper Body Dressing: Independent    Lower Body Dressing: Stand-by assistance    Toileting: Independent   * Inferred per obs of BUE ROM, functional mobility, activity tolerance, and cognition    ADL Intervention and task modifications:    Lower Body Dressing Assistance  Socks: Independent  Leg Crossed Method Used: Yes  Position Performed: Seated edge of bed    Cognitive Retraining  Safety/Judgement: Awareness of environment; Fall prevention; Insight into deficits    Functional Measure:  Barthel Index:    Bathin  Bladder: 10  Bowels: 10  Groomin  Dressing: 10  Feeding: 10  Mobility: 15  Stairs: 0  Toilet Use: 10  Transfer (Bed to Chair and Back): 15  Total: 85       Barthel and G-code impairment scale:  Percentage of impairment CH  0% CI  1-19% CJ  20-39% CK  40-59% CL  60-79% CM  80-99% CN  100%   Barthel Score 0-100 100 99-80 79-60 59-40 20-39 1-19   0   Barthel Score 0-20 20 17-19 13-16 9-12 5-8 1-4 0      The Barthel ADL Index: Guidelines  1. The index should be used as a record of what a patient does, not as a record of what a patient could do. 2. The main aim is to establish degree of independence from any help, physical or verbal, however minor and for whatever reason. 3. The need for supervision renders the patient not independent. 4. A patient's performance should be established using the best available evidence. Asking the patient, friends/relatives and nurses are the usual sources, but direct observation and common sense are also important. However direct testing is not needed. 5. Usually the patient's performance over the preceding 24-48 hours is important, but occasionally longer periods will be relevant. 6. Middle categories imply that the patient supplies over 50 per cent of the effort. 7. Use of aids to be independent is allowed. Edmond G[place]., Barthel, D.W. (0797). Functional evaluation: the Barthel Index. [address] [facility] (14)2. Priti Mendes judith NELIDA Beckman, Trish Neal., Solomon Paul., Jeff, [address] [place] [address] (). Measuring the change indisability after inpatient rehabilitation; comparison of the responsiveness of the Barthel Index and Functional Knott Measure.  Journal of Neurology, Neurosurgery, and Psychiatry, 66(4), 107-811. JOSE ALBERTO Eugene, ISAIAS Ca, & Ria Perez M.A. (2004.) Assessment of post-stroke quality of life in cost-effectiveness studies: The usefulness of the Barthel Index and the EuroQoL-5D. Quality of Life Research, 13, 838-60         G codes: In compliance with CMSs Claims Based Outcome Reporting, the following G-code set was chosen for this patient based on their primary functional limitation being treated: The outcome measure chosen to determine the severity of the functional limitation was the Barthel Index with a score of 85/100 which was correlated with the impairment scale. ? Self Care:     - CURRENT STATUS: CI - 1%-19% impaired, limited or restricted    - GOAL STATUS: CH - 0% impaired, limited or restricted    - D/C STATUS:  ---------------To be determined---------------     Occupational Therapy Evaluation Charge Determination   History Examination Decision-Making   LOW Complexity : Brief history review  LOW Complexity : 1-3 performance deficits relating to physical, cognitive , or psychosocial skils that result in activity limitations and / or participation restrictions  MEDIUM Complexity : Patient may present with comorbidities that affect occupational performnce. Miniml to moderate modification of tasks or assistance (eg, physical or verbal ) with assesment(s) is necessary to enable patient to complete evaluation       Based on the above components, the patient evaluation is determined to be of the following complexity level: LOW   Activity Tolerance:   Good. Please refer to the flowsheet for vital signs taken during this treatment.   After treatment:   [] Patient left in no apparent distress sitting up in chair  [x] Patient left in no apparent distress in bed  [x] Call bell left within reach  [x] Nursing notified  [x] Caregiver present  [x] Bed alarm activated    COMMUNICATION/EDUCATION:   The patients plan of care was discussed with: Registered Nurse. [x] Home safety education was provided and the patient/caregiver indicated understanding. [x] Patient/family have participated as able in goal setting and plan of care. [] Patient/family agree to work toward stated goals and plan of care. [] Patient understands intent and goals of therapy, but is neutral about his/her participation. [] Patient is unable to participate in goal setting and plan of care. This patients plan of care is appropriate for delegation to CRISTIAN.     Thank you for this referral.  Ceci Velasquez OT  Time Calculation: 32 mins

## 2018-01-08 NOTE — PROGRESS NOTES
Amy Khalil is a 75 y.o. with a history of vertigo/meniere's disease GERD and hypertension who was transferred from [facility]. He presented to the outside ER due to a change in mental status. Per records he had been sick for six days with vomiting, headache, diarrhea and altered mental status. He had been seen by a physician and started on an antibiotic for bronchitis. Per chart review workup included CXR, CT head, lumbar puncture, blood cultures and urine culture. Outside [place] [address], Lactic acid of 2.4,   LP demonstrated 2300 white cells with 94% neutrophils,  CSF glucose < 10.  no RBC an total protein >300. GS CSF had many WBC's but no organisms. Phuc Kathryncathi He was treated with Zosyn 3.375g last night. Has received 2g of Ceftriaxone and 2g of Vancomycin this morning. He was transferred to [address] [place] [address] for higher level of care. Spoke to patient 's wife - he has been feeling unwell since before [place] for the past 10 days. 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 HE started with cough and then had headache - He went to [facility] over the weekend and was given zpak and prednisone. He started developing fever the next day and was c/o headache, nausea and vomiting HE was also dizzy. He was restless the whole night and on Monday as his temp was 104 and he was taken to [facility] on 1/1/18. CT head with out contrast showed opacity involving portion of rt mastoid and middle ear cavity questioning otomastoiditis  He got a dose of zosyn and then had LP which was as above and then given ceftriaxone/vanco and transferred                 Data  this hospitalization  Date  TMAX WBC Abn labs Cultures ABX   1/2/18 101.3 20.9  Cr 1.30  T. bilirubin - 1.9, Glucose 140 Bc-NG Ctx  Ampicillin  vanco    1/3/18    23. 3    mastoid fluid- NG      1/4/17  97.4  16.7  cr 1.03    DC ampicillin    1/5/18  N  12.1  cr 1.06        1/6/18  N  11.8  cr 1.05  BC-NG  DC vanco    1/7/18  N            1/8/18  N  9.8  cr 0.96                         subjective  Had DAVI - no endocarditis  In the recovery area he was found to be ashen and blue with sats in 80s. No tachycardia or hypotension  Transferred to ICU  Says he is feeling okay      Objective:   VITALS:     Visit Vitals    /60    Pulse 60    Temp 98.8 °F (37.1 °C)    Resp 18    Ht 5' 9\" (1.753 m)    Wt 197 lb 1.5 oz (89.4 kg)    SpO2 93%    BMI 29.11 kg/m2     PHYSICAL EXAM:   Awake and alert  Pale, lips blue  Chest b/l air entry  HS -S1s2  Abd soft  Rt mastoid surgical site - no erythema or discharge   CNS-non focal    Pertinent Labs   BC from OSH-   strep pneumo  Csf-NG     IMAGING RESULTS:     CT head  Chronic ethmoid sinusitis. Partial opacification of right mastoid air cell, question otomastoiditis.             Impression/Recommendation  74 yo male with history of hypertension and GERD who is admitted with altered mental status,headache    fever, high wbc, csf neutrophilic pleocytosis        Pneumococcus bacteremia with rt pneumonia and meningitis- r/o endocarditis as he may have [facility] triad-   Spoke with  as 2 d echo cannot completely rule out vegetations he had DAVI  which is negative. POST DAVI hypoxemia- asymptomatic- methemoglobinemia due to topical anesthesia being ruled out. Bacterial Meningitis  secondary to the right  otomastoiditis-  Encephalopathy resolved  Likely due to pneumococcus ( eventhough CSF culture neg)    S/p Right tympanomastoidectomy.   Myringotomy tubes  Will need  IV ceftriaxone ( 1/22/18)    Rt pneumonia - likely due to pneumococcus  HIV test-negative  Immuno compromised due to alcohol  Will need pneumococcal vaccines 13 and 23 after completion of antibiotic       HTN- management as per primary team     Discussed the management with hospitalist and the patient and his wife

## 2018-01-08 NOTE — PROGRESS NOTES
TRANSFER - IN REPORT:    Verbal report received from [place] [address] on Rito  being received from cath lab procedure area  for routine progression of care. Report consisted of patients Situation, Background, Assessment and Recommendations(SBAR). Information from the following report(s) Kardex and Procedure Summary was reviewed with the receiving clinician. Opportunity for questions and clarification was provided. Assessment completed upon patients arrival to [address] [place] [address] and care assumed. Cardiac Cath Lab Recovery Arrival Note:    Theresas arrived to [facility] recovery area. Patient procedure= DAVI. Patient on cardiac monitor, non-invasive blood pressure, SPO2 monitor. On  O2 @ 4 lpm via NC.  IV  of NS on pump at 25 ml/hr. Patient status doing well without problems. Patient is A&Ox 3. Patient reports NO pain. PROCEDURE SITE CHECK:    Procedure site:       No change in patient status. Continue to monitor patient and status.

## 2018-01-08 NOTE — CONSULTS
[facility]    Called to see patient for post procedural hypoxemia. Full note to follow  S/P DAVI and use of topical anesthetic  Seen and examined. Denies SOB or chest pain  VSS and ABG with adequate gas exchange  Co-oximetry for suspected metHb is pending  Given that he is asymptomatic, if MetHb is < 20% >> observation would be recommended  If more than 20%, further recommendations to follow  RT informed of need for stat Co-oximetry    D/W cardiology and RN    Chandu Carmona MD        PULMONARY/CRITICAL CARE/SLEEP MEDICINE    Initial Physician Consultation Note    Name: Selma Leyva   : 1950   MRN: [id number]   Date: 2018      Subjective:   Consult Note: 2018   Requesting Physician: Dr. Hermann Dwyer  Reason for consult: Post procedure hypoxic pulmonary insufficiency    Medical records and data reviewed. Patient is a 70 y.o. male who presented to the hospital with complaints of AMS. He was found to have meningitis related to mastoiditis. DAVI was recommended and ? felt necessary to rule out bacterial endocarditis also and he underwent the procedure under conscious sedation and topical anesthesia earlier today. Topical anesthetic was used and post procedure he was found to have central cyanosis. DAVI report does not mention if agitated saline was used to check for right to left shunt. ABG obtained showed normal gas exchange. Co-oximetry has been ordered and is pending. Patient reports he is not dyspneic, denies chest pain, cough, abd pain, nausea or vomiting.     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, currently asymptomatic  Meningitis with mastoiditis  No prior history of lung disease per patient    Recommendations:   Co-oximetry pending: discussed with RT and ordered stat  If Methb < 20 %, observation is planned  If mote severe Methb is identified or if he develops symptoms, further aggressive management would be indicated  D/W cardiology      Active Problem List:     Problem List  Never Reviewed          Codes Class    Pneumonia ICD-10-CM: J18.9  ICD-9-CM: 486         Bacterial meningitis ICD-10-CM: G00.9  ICD-9-CM: 320.9               Past Medical History:      has a past medical history of GERD (gastroesophageal reflux disease) and Hypertension. Past Surgical History:      has no past surgical history on file. Home Medications:     Prior to Admission medications    Medication Sig Start Date End Date Taking? Authorizing Provider   azithromycin (ZITHROMAX) 250 mg tablet Take 250 mg by mouth daily. Yes Historical Provider   irbesartan (AVAPRO) 150 mg tablet Take 150 mg by mouth nightly. Yes Historical Provider   Omeprazole delayed release (PRILOSEC D/R) 20 mg tablet Take 20 mg by mouth daily. Yes Historical Provider   fluticasone (FLONASE) 50 mcg/actuation nasal spray 2 Sprays by Both Nostrils route daily. Historical Provider   chlorpheniramine-HYDROcodone Lavella Hoyles [facility]) 10-8 mg/5 mL suspension Take 5 mL by mouth every twelve (12) hours as needed for Cough. Historical Provider   oxymetazoline (MARIA ELENA-SYNEPHRINE 12 H SPR, OXYM,) 0.05 % nasal spray 2 Sprays two (2) times a day. Historical Provider   tamsulosin (FLOMAX) 0.4 mg capsule Take 0.4 mg by mouth daily. Historical Provider       Allergies/Social/Family History:     No Known Allergies   Social History   Substance Use Topics    Smoking status: Former Smoker    Smokeless tobacco: Never Used    Alcohol use No      History reviewed. No pertinent family history.          Objective:   Vital Signs:  Visit Vitals    /69 (BP 1 Location: Left arm, BP Patient Position: At rest)    Pulse 80    Temp 98.5 °F (36.9 °C)    Resp 12    Ht 5' 8\" (1.727 m)    Wt 91 kg (200 lb 9.6 oz)    SpO2 91%    BMI 30.5 kg/m2    O2 Flow Rate (L/min): 4 l/min O2 Device: Nasal cannula Temp (24hrs), Av.6 °F (37 °C), Min:98.3 °F (36.8 °C), Max:98.9 °F (37.2 °C)           Intake/Output:     Intake/Output Summary (Last 24 hours) at 18 1812  Last data filed at 18 1545   Gross per 24 hour   Intake                0 ml   Output             2125 ml   Net            -2125 ml       Physical Exam:   General:  Alert, cooperative, no distress, appears stated age. Head:  Normocephalic, without obvious abnormality, atraumatic. Central cyanosis   Eyes:  Conjunctivae/corneas clear. PERRL, EOMs intact. Neck: Supple, symmetrical, trachea midline, no adenopathy, thyroid: no enlargment/tenderness/nodules, no carotid bruit and no JVD. Lungs:   Clear to auscultation bilaterally. Chest wall:  No tenderness or deformity. Heart:  Regular rate and rhythm, S1, S2 normal, no murmur, click, rub or gallop. Abdomen:   Soft, non-tender. Bowel sounds normal. No masses,  No organomegaly. Extremities: Extremities normal, atraumatic, no cyanosis or edema. Pulses: 2+ and symmetric all extremities. Skin: Skin color, texture, turgor normal. No rashes or lesions   Neurologic: Grossly nonfocal         LABS AND  DATA: Personally reviewed  Recent Labs      18   0308  18   0637   WBC  9.8  10.0   HGB  11.2*  10.7*   HCT  33.1*  32.1*   PLT  371  340     Recent Labs      18   0637  18   0643   NA  139  142   K  3.9  3.8   CL  109*  113*   CO2  21  20*   BUN  22*  30*   CREA  0.96  1.05   GLU  86  85   CA  7.3*  7.7*   MG  2.1   --      No results for input(s): SGOT, GPT, AP, TBIL, TP, ALB, GLOB, AML, LPSE in the last 72 hours. No lab exists for component: AMYP  No results for input(s): INR, PTP, APTT in the last 72 hours. No lab exists for component: INREXT   Recent Labs      18   1343   PHI  7.436   PCO2I  34.8*   PO2I  260*   FIO2I  100     No results for input(s): CPK, CKMB, TROIQ, BNPP in the last 72 hours.     MEDS: Reviewed    Chest Imaging: personally reviewed and report checked    Tele- reviewed      Thank you for allowing me to participate in this patient's care.     MD Dinora Stevens MD, [facility]  [facility]

## 2018-01-08 NOTE — PROGRESS NOTES
Called to see pt by cath lab recovery team d/t low O2 sats on pulse oximetry and cyanosis. Came to bedside- Pt denied chest pain, SOB. Pt dusky, cyanotic lips and nailbeds. Respirations nonlabored, lungs clear. Heart tones RRR, no murmur noted. Abdomen soft, nontender. Pulses 2+ bilaterally. O2 sat on bedside pulse oximetry is 90% on 100% NRM. Howewver, Obtained ABGs and ph, pCO2 normal, pO2 >200, O2 sat 100%. CXR no acute findings. Suspect methemoglobinemia. Confirmed with cath lab staff that hurricaine spray used during procedure. Dr. Rosalba Light came to bedside. Notified Primary team Dr. Chandra Guevara and consulted Pulmonary service- spoke with Dr. Andre Smalls. Pt and family notified.

## 2018-01-09 ENCOUNTER — HOME HEALTH ADMISSION (OUTPATIENT)
Dept: HOME HEALTH SERVICES | Facility: HOME HEALTH | Age: 68
End: 2018-01-09
Payer: MEDICARE

## 2018-01-09 VITALS
DIASTOLIC BLOOD PRESSURE: 63 MMHG | TEMPERATURE: 98 F | WEIGHT: 195.5 LBS | SYSTOLIC BLOOD PRESSURE: 146 MMHG | BODY MASS INDEX: 29.63 KG/M2 | HEIGHT: 68 IN | HEART RATE: 93 BPM | RESPIRATION RATE: 27 BRPM | OXYGEN SATURATION: 97 %

## 2018-01-09 LAB
ANION GAP SERPL CALC-SCNC: 5 MMOL/L (ref 5–15)
BUN SERPL-MCNC: 12 MG/DL (ref 6–20)
BUN/CREAT SERPL: 15 (ref 12–20)
CALCIUM SERPL-MCNC: 7.8 MG/DL (ref 8.5–10.1)
CHLORIDE SERPL-SCNC: 106 MMOL/L (ref 97–108)
CO2 SERPL-SCNC: 27 MMOL/L (ref 21–32)
COHGB MFR BLD: 1.3 % (ref 1–2)
COHGB MFR BLD: 1.7 % (ref 1–2)
CREAT SERPL-MCNC: 0.82 MG/DL (ref 0.7–1.3)
ERYTHROCYTE [DISTWIDTH] IN BLOOD BY AUTOMATED COUNT: 12.9 % (ref 11.5–14.5)
GLUCOSE SERPL-MCNC: 101 MG/DL (ref 65–100)
HCT VFR BLD AUTO: 34.2 % (ref 36.6–50.3)
HGB BLD OXIMETRY-MCNC: 12.1 G/DL (ref 14–17)
HGB BLD OXIMETRY-MCNC: 12.3 G/DL (ref 14–17)
HGB BLD-MCNC: 11.4 G/DL (ref 12.1–17)
MCH RBC QN AUTO: 29.6 PG (ref 26–34)
MCHC RBC AUTO-ENTMCNC: 33.3 G/DL (ref 30–36.5)
MCV RBC AUTO: 88.8 FL (ref 80–99)
METHGB MFR BLD: 0.9 % (ref 0–1.4)
METHGB MFR BLD: 38.6 % (ref 0–1.4)
OXYHGB MFR BLD: 59.5 % (ref 94–97)
OXYHGB MFR BLD: 95.7 % (ref 94–97)
PLATELET # BLD AUTO: 395 K/UL (ref 150–400)
POTASSIUM SERPL-SCNC: 3.9 MMOL/L (ref 3.5–5.1)
RBC # BLD AUTO: 3.85 M/UL (ref 4.1–5.7)
SAO2 % BLD: 98 % (ref 95–99)
SAO2 % BLD: 99 % (ref 95–99)
SODIUM SERPL-SCNC: 138 MMOL/L (ref 136–145)
WBC # BLD AUTO: 9.9 K/UL (ref 4.1–11.1)

## 2018-01-09 PROCEDURE — 74011000258 HC RX REV CODE- 258: Performed by: INTERNAL MEDICINE

## 2018-01-09 PROCEDURE — 74011250636 HC RX REV CODE- 250/636: Performed by: INTERNAL MEDICINE

## 2018-01-09 PROCEDURE — 80048 BASIC METABOLIC PNL TOTAL CA: CPT | Performed by: HOSPITALIST

## 2018-01-09 PROCEDURE — 36415 COLL VENOUS BLD VENIPUNCTURE: CPT | Performed by: HOSPITALIST

## 2018-01-09 PROCEDURE — 77010033678 HC OXYGEN DAILY

## 2018-01-09 PROCEDURE — 74011250637 HC RX REV CODE- 250/637: Performed by: SPECIALIST

## 2018-01-09 PROCEDURE — 85027 COMPLETE CBC AUTOMATED: CPT | Performed by: HOSPITALIST

## 2018-01-09 PROCEDURE — 82375 ASSAY CARBOXYHB QUANT: CPT | Performed by: INTERNAL MEDICINE

## 2018-01-09 RX ORDER — BUTALBITAL, ACETAMINOPHEN AND CAFFEINE 50; 325; 40 MG/1; MG/1; MG/1
1 TABLET ORAL
Qty: 10 TAB | Refills: 0 | Status: SHIPPED | OUTPATIENT
Start: 2018-01-09

## 2018-01-09 RX ORDER — CIPROFLOXACIN AND DEXAMETHASONE 3; 1 MG/ML; MG/ML
4 SUSPENSION/ DROPS AURICULAR (OTIC) 2 TIMES DAILY
Qty: 7.5 ML | Refills: 0 | Status: SHIPPED | OUTPATIENT
Start: 2018-01-09 | End: 2018-01-19

## 2018-01-09 RX ADMIN — LOSARTAN POTASSIUM 50 MG: 25 TABLET ORAL at 09:01

## 2018-01-09 RX ADMIN — PANTOPRAZOLE SODIUM 40 MG: 40 TABLET, DELAYED RELEASE ORAL at 07:17

## 2018-01-09 RX ADMIN — HEPARIN SODIUM 5000 UNITS: 5000 INJECTION, SOLUTION INTRAVENOUS; SUBCUTANEOUS at 08:54

## 2018-01-09 RX ADMIN — Medication 10 ML: at 05:50

## 2018-01-09 RX ADMIN — CIPROFLOXACIN AND DEXAMETHASONE 4 DROP: 3; 1 SUSPENSION/ DROPS AURICULAR (OTIC) at 08:56

## 2018-01-09 RX ADMIN — CEFTRIAXONE 2 G: 2 INJECTION, POWDER, FOR SOLUTION INTRAMUSCULAR; INTRAVENOUS at 08:53

## 2018-01-09 RX ADMIN — TAMSULOSIN HYDROCHLORIDE 0.4 MG: 0.4 CAPSULE ORAL at 09:00

## 2018-01-09 RX ADMIN — FLUTICASONE PROPIONATE 2 SPRAY: 50 SPRAY, METERED NASAL at 09:00

## 2018-01-09 NOTE — DISCHARGE SUMMARY
Discharge Summary       PATIENT ID: Jose Oliver  MRN: [id number]   YOB: 1950    DATE OF ADMISSION: 1/2/2018 10:15 AM    DATE OF DISCHARGE: 1/9/2018   PRIMARY CARE PROVIDER: Coco Desai MD     ATTENDING PHYSICIAN: Dr Nj Lo  DISCHARGING PROVIDER: Nj Lo MD    To contact this individual call [phone] and ask the  to page. If unavailable ask to be transferred the Adult Hospitalist Department. CONSULTATIONS: IP CONSULT TO HOSPITALIST  IP CONSULT TO INFECTIOUS DISEASES  IP CONSULT TO INFECTIOUS DISEASES  IP CONSULT TO OTOLARYNGOLOGY  IP CONSULT TO CARDIOLOGY  IP CONSULT TO PULMONOLOGY    PROCEDURES/SURGERIES: Procedure(s):  TYMPANOMASTOIDECTOMY AND EAR TUBES- RIGHT; PLACEMENT OF FACIAL NERVE ELECTRODES    ADMITTING DIAGNOSES & HOSPITAL COURSE:   Toxic metabolic encephalopathy: Secondary to Sepsis d/t Otomastoiditis causing meningitis as evidenced by CT scan of the head showing opacification of the right mastoid. CSF analysis also highly suggestive of meningitis. CT maxillofacial with contrast shows fluid/soft tissue opacification right middle ear and mastoid suggesting inflammation. S/p Tympanomastoidectomy and ear tube right with placement of facial nerve electrodes. POD # 6  Improving. Now oriented and conversant  Continue supportive care  Resolved. 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 -Appreciate pulm input, pending co-oximetry results.   -resolved    Otomastoiditis:S/p Tympanomastoidectomy and ear tube right with placement of facial nerve electrodes. Cultures negative so far from mastoid area  F/u with ENT one week post discharge- Dr Joaquina Silveira. Change cotton ball every shift in right ear. Continue ciprodex ear drops    Bacterial Meningitis; D/t pneumococcus  blood cx from [address] [place] [address]- Pneumococcus. Likely cause of meningitis. CSF culture 1/2/18 negative so far  Continue Ceftriaxone, and Vancomycin. Ampicillin discontinued.  D/c Dexamethasone  F/u on HIV 1/2 ag/ab. Needs Pneumovax vaccine 13 and 23 as an out-patient. Blood culture 1/2/18: no growth till date. Got midline on 1/7/18  Discharge home on I.V Ceftriaxone 2 gm daily for 2 weeks - last dose 1/22/18  Start priobiotic  I.D- Dr Suzanne Claire following    Pneumococcus bacteremia: Continue current abx,  DAVI, no vegetations. Sepsis: Meets criterial on admission. Resolved  Continue abx  Blood culture's no growth    Right supra-hilar opacity: Presumed community acquired pneumonia d/t strep pneumonia. Continue Rocephin and Vancomycin  guaifenesin prn cough    Pt. May need OP sleep study. ENT, ID follow up. Code status: Full  DVT prophylaxis: heparin    Plan: Discharge home with home IV antibiotics. CM has already arranged. Will discharge the patient. DISCHARGE DIAGNOSES / PLAN:      1. Otomastoiditis       PENDING TEST RESULTS:   At the time of discharge the following test results are still pending: none    FOLLOW UP APPOINTMENTS:    Follow-up Information     Follow up With Details Comments [address] IV medication 28[address] [facility].   [facility] [address]  [facility] [address]: [place] In 1 day Home  [address], MD In 1 week  [facility] [address]  [address] [facility]      Juanjo Pham MD In 1 week  [id number] [address] and [address] [address] [facility]      Robbie Gerber MD In 2 weeks  [address] [facility] [address] [facility],[address] Floor  [phone]             ADDITIONAL CARE RECOMMENDATIONS:   Follow up with PMD/ Dr Bernabe Sinha as outpatient  Care of Midline including biopatch  2) Ceftriaxone 2 grams IV Every 12 hours until 1/22/18  3) CBC/CMP on 1/15 and 1/23/18  4) remove midline on 1/23/18  5) Fax results to [phone] promptly  6) call Alecia Kohler on [phone] with any questions or for critical value    DIET: Cardiac Diet    ACTIVITY: Activity as tolerated      DISCHARGE MEDICATIONS:  Current Discharge Medication List      START taking these medications    Details   ciprofloxacin-dexamethasone (CIPRODEX) 0.3-0.1 % otic suspension Administer 4 Drops in right ear two (2) times a day for 10 days. Indications: Acute Otitis Media with Tympanostomy Tubes  Qty: 7.5 mL, Refills: 0      butalbital-acetaminophen-caffeine (FIORICET, ESGIC) -40 mg per tablet Take 1 Tab by mouth every four (4) hours as needed for Headache. Indications: TENSION-TYPE HEADACHE  Qty: 10 Tab, Refills: 0         CONTINUE these medications which have NOT CHANGED    Details   irbesartan (AVAPRO) 150 mg tablet Take 150 mg by mouth nightly. Omeprazole delayed release (PRILOSEC D/R) 20 mg tablet Take 20 mg by mouth daily. fluticasone (FLONASE) 50 mcg/actuation nasal spray 2 Sprays by Both Nostrils route daily. oxymetazoline (MARIA ELENA-SYNEPHRINE 12 H SPR, OXYM,) 0.05 % nasal spray 2 Sprays two (2) times a day. tamsulosin (FLOMAX) 0.4 mg capsule Take 0.4 mg by mouth daily. STOP taking these medications       azithromycin (ZITHROMAX) 250 mg tablet Comments:   Reason for Stopping:         chlorpheniramine-HYDROcodone (TUSSIONEX PENNKINETIC ER) 10-8 mg/5 mL suspension Comments:   Reason for Stopping:                 NOTIFY YOUR PHYSICIAN FOR ANY OF THE FOLLOWING:   Fever over 101 degrees for 24 hours. Chest pain, shortness of breath, fever, chills, nausea, vomiting, diarrhea, change in mentation, falling, weakness, bleeding. Severe pain or pain not relieved by medications. Or, any other signs or symptoms that you may have questions about.     DISPOSITION:   x Home With:   OT  PT  HH  RN       Long term SNF/Inpatient Rehab    Independent/assisted living    Hospice    Other:       PATIENT CONDITION AT DISCHARGE:     Functional status    Poor     Deconditioned    x Independent      Cognition    x Lucid     Forgetful Dementia      Catheters/lines (plus indication)    Fall     PICC     PEG    x None      Code status    x Full code     DNR      PHYSICAL EXAMINATION AT DISCHARGE:  Please see progress note      CHRONIC MEDICAL DIAGNOSES:  Problem List as of 1/9/2018  Never Reviewed          Codes Class Noted - Resolved    Pneumonia ICD-10-CM: J18.9  ICD-9-CM: 518  1/2/2018 - Present        Bacterial meningitis ICD-10-CM: G00.9  ICD-9-CM: 320.9  1/2/2018 - Present              Greater than 39 minutes were spent with the patient on counseling and coordination of care    Signed:   Jody Liz MD  1/9/2018  11:52 AM

## 2018-01-09 NOTE — PROGRESS NOTES
1315: I have reviewed discharge instructions with the patient and spouse. The patient and spouse verbalized understanding. PIV removed. Patient wheeled down by PCT.

## 2018-01-09 NOTE — PROGRESS NOTES
CM reviewed chart. CM spoke with [facility] who confirmed that they could accept pt and that they would be by soon to provide teaching to pt and wife. Pt and wife are agreeable to cost of medication. CM also called [address] out of [facility] and they have also accepted pt for services and can start care tomorrow. Pt and wife are in agreement with plan. Wife will transport patient home.   Endy Raymond, BSW, ACM

## 2018-01-09 NOTE — ROUTINE PROCESS
1915 Primary Nurse Steven Strange RN and Myra Nickerson RN performed a dual skin assessment on this patient No impairment noted (blanchable bruising noted to upper sacrum)  Peter score is 16

## 2018-01-09 NOTE — PROGRESS NOTES
2330 Bedside and Verbal shift change report given to Dee Gallegos RN  (oncoming nurse) by Gina Luong  (offgoing nurse). Report included the following information SBAR, Kardex, Intake/Output, MAR, Accordion, Recent Results, Med Rec Status, Cardiac Rhythm normal sinus and Alarm Parameters . Patient had uneventful shift, patient walked hallway this am, no distress noted throughout the night. 0730 Bedside and Verbal shift change report given to Tarun Fleming RN  (oncoming nurse) by Astrid freeman (offgoing nurse). Report included the following information SBAR, Kardex, Procedure Summary, Intake/Output, MAR, Accordion, Recent Results, Med Rec Status, Cardiac Rhythm normal sinus and Alarm Parameters .

## 2018-01-09 NOTE — PROGRESS NOTES
PULMONARY/CRITICAL CARE/SLEEP MEDICINE    Physician Consultation Note    Name: Merline Heidelberg   : 1950   MRN: [id number]   Date: 2018      Subjective:       Seen and examined earlier today. No symptoms of pain or shortness of breath and normal color. Received methylene blue for metHb of 35%. MetHb this am is 0.3. OK to discharge from pulmonary standpoint and we will see him again as needed    Consult Note:   Requesting Physician: Dr. Marcie Santana  Reason for consult: Post procedure hypoxic pulmonary insufficiency    Medical records and data reviewed. Patient is a 70 y.o. male who presented to the hospital with complaints of AMS. He was found to have meningitis related to mastoiditis. DAVI was recommended and ? felt necessary to rule out bacterial endocarditis also and he underwent the procedure under conscious sedation and topical anesthesia earlier today. Topical anesthetic was used and post procedure he was found to have central cyanosis. DAVI report does not mention if agitated saline was used to check for right to left shunt. ABG obtained showed normal gas exchange. Co-oximetry has been ordered and is pending. Patient reports he is not dyspneic, denies chest pain, cough, abd pain, nausea or vomiting.     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 Past Surgical History:      has no past surgical history on file. Home Medications:     Prior to Admission medications    Medication Sig Start Date End Date Taking? Authorizing Provider   ciprofloxacin-dexamethasone (CIPRODEX) 0.3-0.1 % otic suspension Administer 4 Drops in right ear two (2) times a day for 10 days. Indications: Acute Otitis Media with Tympanostomy Tubes 18 Yes Dayton More MD   butalbital-acetaminophen-caffeine (FIORICET, ESGIC) -40 mg per tablet Take 1 Tab by mouth every four (4) hours as needed for Headache. Indications: TENSION-TYPE HEADACHE 18  Yes Dayton More MD   irbesartan (AVAPRO) 150 mg tablet Take 150 mg by mouth nightly. Yes Historical Provider   Omeprazole delayed release (PRILOSEC D/R) 20 mg tablet Take 20 mg by mouth daily. Yes Historical Provider   fluticasone (FLONASE) 50 mcg/actuation nasal spray 2 Sprays by Both Nostrils route daily. Historical Provider   oxymetazoline (MARIA ELENA-SYNEPHRINE 12 H SPR, OXYM,) 0.05 % nasal spray 2 Sprays two (2) times a day. Historical Provider   tamsulosin (FLOMAX) 0.4 mg capsule Take 0.4 mg by mouth daily. Historical Provider       Allergies/Social/Family History: Allergies   Allergen Reactions    Hurricaine [Benzocaine] Other (comments)     Methemoglobinemia      Social History   Substance Use Topics    Smoking status: Former Smoker    Smokeless tobacco: Never Used    Alcohol use No      History reviewed. No pertinent family history.          Objective:   Vital Signs:  Visit Vitals    /63    Pulse 93    Temp 98 °F (36.7 °C)    Resp 27    Ht 5' 8\" (1.727 m)    Wt 88.7 kg (195 lb 8 oz)    SpO2 97%    BMI 29.73 kg/m2    O2 Flow Rate (L/min): 4 l/min O2 Device: Room air Temp (24hrs), Av.3 °F (36.8 °C), Min:98 °F (36.7 °C), Max:98.5 °F (36.9 °C)           Intake/Output:     Intake/Output Summary (Last 24 hours) at 18 1407  Last data filed at 01/09/18 0800   Gross per 24 hour   Intake             1025 ml   Output             1350 ml   Net             -325 ml       Physical Exam:   General:  Alert, cooperative, no distress, appears stated age. Head:  Normocephalic, without obvious abnormality, atraumatic. Eyes:  Conjunctivae/corneas clear. PERRL, EOMs intact. Neck: Supple, symmetrical, trachea midline, no adenopathy, thyroid: no enlargment/tenderness/nodules, no carotid bruit and no JVD. Lungs:   Clear to auscultation bilaterally. Chest wall:  No tenderness or deformity. Heart:  Regular rate and rhythm, S1, S2 normal, no murmur, click, rub or gallop. Abdomen:   Soft, non-tender. Bowel sounds normal. No masses,  No organomegaly. Extremities: Extremities normal, atraumatic, no cyanosis or edema. Pulses: 2+ and symmetric all extremities. Skin: Skin color, texture, turgor normal. No rashes or lesions   Neurologic: Grossly nonfocal         LABS AND  DATA: Personally reviewed  Recent Labs      01/09/18   0540  01/08/18   0308   WBC  9.9  9.8   HGB  11.4*  11.2*   HCT  34.2*  33.1*   PLT  395  371     Recent Labs      01/09/18   0540  01/07/18   0637   NA  138  139   K  3.9  3.9   CL  106  109*   CO2  27  21   BUN  12  22*   CREA  0.82  0.96   GLU  101*  86   CA  7.8*  7.3*   MG   --   2.1     No results for input(s): SGOT, GPT, AP, TBIL, TP, ALB, GLOB, AML, LPSE in the last 72 hours. No lab exists for component: AMYP  No results for input(s): INR, PTP, APTT in the last 72 hours. No lab exists for component: INREXT, INREXT   Recent Labs      01/08/18   1343   PHI  7.436   PCO2I  34.8*   PO2I  260*   FIO2I  100     No results for input(s): CPK, CKMB, TROIQ, BNPP in the last 72 hours. MEDS: Reviewed    Chest Imaging: personally reviewed and report checked    Tele- reviewed      Thank you for allowing me to participate in this patient's care.     MD Kyle Manning MD, [facility]  [facility]

## 2018-01-09 NOTE — PROGRESS NOTES
1645 Patient received from cath lab post DAVI, NSR on monitor, all vitals stable. Patient without any complaints of shortness of breath although skin very ashen and extremities cool. 1740 Co-oximetry results noted to be 38%, Dr. Brittny Mccracken notified and orders received for methylene blue to be given and repeat co-oximetry 1 hour post infusion. 1928 Methylene blue started.       2130 Co-oximetry results reported at 0.9% (see results in paper chart)

## 2018-01-09 NOTE — PROGRESS NOTES
Hospitalist Progress Note  Woody Mckeon MD  Answering service: [phone] OR [phone] from in house phone  Cell: [phone]      Date of Service:  2018  NAME:  Kesha Daniel  :  1950  MRN:  [id number]      Admission Summary:     Patient is a 75year old male with past medical history of Hypertension, who was transferred from [facility] & [address] on account of altered mental status. He had a CT scan of the head done 18 showing opacity involving the right mastoid and middle ear cavity questioning otomastoditis. Lumbar puncture done showed wbc of 23,00, Neutrophils of 945 and CSF glu < 10    Patient was admitted for possible meningitis    Interval history / Subjective:       F/u for otomastoditis/meningitis    No acute overnight events. S/p Tympanomastoidectomy and ear tube right with placement of facial nerve electrodes. POD # 5  Sleeping, sister at bedside. Says did not eat much for breakfast and lunch. Had DAVI , no vegetations. Looks like he became hypoxic, cyanotic after the procedure, ?methhemoglobinemia sec to anaesthetic spray. Appreciate pulm input, pending co-oximetry results. Assessment & Plan:     Toxic metabolic encephalopathy: Secondary to Sepsis d/t Otomastoiditis causing meningitis as evidenced by CT scan of the head showing opacification of the right mastoid. CSF analysis also highly suggestive of meningitis. CT maxillofacial with contrast shows fluid/soft tissue opacification right middle ear and mastoid suggesting inflammation. S/p Tympanomastoidectomy and ear tube right with placement of facial nerve electrodes. POD # 6  Improving. Now oriented and conversant  Continue supportive care  Resolved. 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     -Appreciate pulm input, pending co-oximetry results.   -resolved    Otomastoiditis:S/p Tympanomastoidectomy and ear tube right with placement of facial nerve electrodes. Cultures negative so far from mastoid area  F/u with ENT one week post discharge- Dr Hema Bell. Change cotton ball every shift in right ear. Continue ciprodex ear drops    Bacterial Meningitis; D/t pneumococcus  [facility] [address] [facility] Pneumococcus. Likely cause of meningitis. CSF culture 1/2/18 negative so far  Continue Ceftriaxone, and Vancomycin. Ampicillin discontinued. D/c Dexamethasone  F/u on HIV 1/2 ag/ab. Needs Pneumovax vaccine 13 and 23 as an out-patient. Blood culture 1/2/18: no growth till date. Got midline on 1/7/18  Discharge home on I.V Ceftriaxone 2 gm daily for 2 weeks - last dose 1/22/18  Start priobiotic  I.D- Dr Ajay Vasquez following    Pneumococcus bacteremia: Continue current abx,  DAVI, no vegetations. Sepsis: Meets criterial on admission. Resolved  Continue abx  Blood culture's no growth    Right supra-hilar opacity: Presumed community acquired pneumonia d/t strep pneumonia. Continue Rocephin and Vancomycin  guaifenesin prn cough    Pt. May need OP sleep study. ENT, ID follow up. Code status: Full  DVT prophylaxis: heparin    Plan: Discharge home with home IV antibiotics. CM has already arranged. Will discharge the patient. Care Plan discussed with: Patient/Family and Nurse  Disposition: home     Hospital Problems  Never Reviewed          Codes Class Noted POA    Pneumonia ICD-10-CM: J18.9  ICD-9-CM: 699  1/2/2018 Unknown        Bacterial meningitis ICD-10-CM: G00.9  ICD-9-CM: 320.9  1/2/2018 Unknown                Review of Systems:   Pertinent items are noted in HPI. Vital Signs:    Last 24hrs VS reviewed since prior progress note.  Most recent are:  Visit Vitals    /70    Pulse (!) 59    Temp 98.2 °F (36.8 °C)    Resp 15    Ht 5' 8\" (1.727 m)    Wt 91 kg (200 lb 9.6 oz)    SpO2 96%    BMI 30.5 kg/m2         Intake/Output Summary (Last 24 hours) at 01/09/18 1128  Last data filed at 01/09/18 0400   Gross per 24 hour   Intake 725 ml   Output             1600 ml   Net             -875 ml        Physical Examination:     Evaluated prior to DAVI. Constitutional:  Not in any obvious distress. ENT:  Oral mucous dry, oropharynx benign. Neck supple, cotton ball right ear   Resp:  CTA bilaterally. No wheezing/rhonchi/rales. No accessory muscle use   CV:  Regular rhythm, normal rate, no murmurs, gallops, rubs    GI:  Soft, non distended, non tender. normoactive bowel sounds    Musculoskeletal:  No edema, warm, 2+ pulses    Neurologic:  Moves all extremities. Awake, and oriented x 3. Data Review:          Labs:     Recent Labs      01/09/18   0540  01/08/18   0308   WBC  9.9  9.8   HGB  11.4*  11.2*   HCT  34.2*  33.1*   PLT  395  371     Recent Labs      01/09/18   0540  01/07/18   0637   NA  138  139   K  3.9  3.9   CL  106  109*   CO2  27  21   BUN  12  22*   CREA  0.82  0.96   GLU  101*  86   CA  7.8*  7.3*   MG   --   2.1     No results for input(s): SGOT, GPT, ALT, AP, TBIL, TBILI, TP, ALB, GLOB, GGT, AML, LPSE in the last 72 hours. No lab exists for component: AMYP, HLPSE  No results for input(s): INR, PTP, APTT in the last 72 hours. No lab exists for component: INREXT, INREXT   No results for input(s): FE, TIBC, PSAT, FERR in the last 72 hours. No results found for: FOL, RBCF   No results for input(s): PH, PCO2, PO2 in the last 72 hours. No results for input(s): CPK, CKNDX, TROIQ in the last 72 hours.     No lab exists for component: CPKMB  No results found for: CHOL, CHOLX, CHLST, CHOLV, HDL, LDL, LDLC, DLDLP, TGLX, TRIGL, TRIGP, CHHD, CHHDX  No results found for: GLUCPOC  No results found for: COLOR, APPRN, SPGRU, REFSG, GERARDO, PROTU, GLUCU, KETU, BILU, UROU, TRISTIN, LEUKU, GLUKE, EPSU, BACTU, WBCU, RBCU, CASTS, UCRY      Medications Reviewed:     Current Facility-Administered Medications   Medication Dose Route Frequency    albuterol (PROVENTIL VENTOLIN) nebulizer solution 1.25 mg  1.25 mg Nebulization Q6H PRN    guaiFENesin-codeine (ROBITUSSIN AC) 100-10 mg/5 mL solution 5 mL  5 mL Oral Q4H PRN    butalbital-acetaminophen-caffeine (FIORICET, ESGIC) -40 mg per tablet 1 Tab  1 Tab Oral Q4H PRN    fluticasone (FLONASE) 50 mcg/actuation nasal spray 2 Spray  2 Spray Both Nostrils DAILY    tamsulosin (FLOMAX) capsule 0.4 mg  0.4 mg Oral DAILY    losartan (COZAAR) tablet 50 mg  50 mg Oral DAILY    pantoprazole (PROTONIX) tablet 40 mg  40 mg Oral ACB    ciprofloxacin-dexamethasone (CIPRODEX) 0.3-0.1 % otic suspension 4 Drop  4 Drop Right Ear BID    sodium chloride (NS) flush 5-10 mL  5-10 mL IntraVENous Q8H    sodium chloride (NS) flush 5-10 mL  5-10 mL IntraVENous PRN    0.9% sodium chloride infusion  75 mL/hr IntraVENous CONTINUOUS    HYDROcodone-acetaminophen (NORCO) 5-325 mg per tablet 1 Tab  1 Tab Oral Q4H PRN    HYDROmorphone (PF) (DILAUDID) injection 0.5 mg  0.5 mg IntraVENous Q4H PRN    naloxone (NARCAN) injection 0.4 mg  0.4 mg IntraVENous PRN    heparin (porcine) injection 5,000 Units  5,000 Units SubCUTAneous Q8H    haloperidol lactate (HALDOL) injection 1 mg  1 mg IntraVENous Q4H PRN    cefTRIAXone (ROCEPHIN) 2 g in 0.9% sodium chloride (MBP/ADV) 50 mL  2 g IntraVENous Q12H     ______________________________________________________________________  EXPECTED LENGTH OF STAY: 10d 7h  ACTUAL LENGTH OF STAY:          Prema Lowe MD

## 2018-01-09 NOTE — DISCHARGE INSTRUCTIONS
Discharge Instructions       PATIENT ID: [facility]  MRN: [id number]   YOB: 1950    DATE OF ADMISSION: 1/2/2018 10:15 AM    DATE OF DISCHARGE: 1/9/2018    PRIMARY CARE PROVIDER: Moreno Caballero MD     ATTENDING PHYSICIAN: Demetris Craft MD  DISCHARGING PROVIDER: Demetris Craft MD    To contact this individual call [phone] and ask the  to page. If unavailable ask to be transferred the Adult Hospitalist Department. DISCHARGE DIAGNOSES     CONSULTATIONS: IP CONSULT TO HOSPITALIST  IP CONSULT TO INFECTIOUS DISEASES  IP CONSULT TO INFECTIOUS DISEASES  IP CONSULT TO OTOLARYNGOLOGY  IP CONSULT TO CARDIOLOGY  IP CONSULT TO PULMONOLOGY    PROCEDURES/SURGERIES: Procedure(s):  TYMPANOMASTOIDECTOMY AND EAR TUBES- RIGHT; PLACEMENT OF FACIAL NERVE ELECTRODES    PENDING TEST RESULTS:   At the time of discharge the following test results are still pending: none    FOLLOW UP APPOINTMENTS:   Follow-up Information     Follow up With Details Comments [address] IV medication 2801 [facility].   [address] 30[address]: [place] In 1 day Home [phone]    Dharmesh Eaton MD In 1 week  Praccl[address] Salas [address]  53[address] [facility] 1755 [facility]      Aline Fleischer, MD In 1 week  [address] [address] and [address] [place] [address] [facility]      Steven Brown MD In 2 weeks  22[address] [facility] [address] [facility],[address] Floor  [phone]             ADDITIONAL CARE RECOMMENDATIONS:   Follow up with Dr Barbie Welch and PMD    Care of Midline including biopatch  2) Ceftriaxone 2 grams IV Every 12 hours until 1/22/18  3) CBC/CMP on 1/15 and 1/23/18  4) remove midline on 1/23/18  5) Fax results to [phone] promptly  6) call Stacey Taylor on [phone] with any questions or for critical value    DIET: Cardiac Diet    ACTIVITY: Activity as tolerated      DISCHARGE MEDICATIONS:   See Medication Reconciliation Form    · It is important that you take the medication exactly as they are prescribed. · Keep your medication in the bottles provided by the pharmacist and keep a list of the medication names, dosages, and times to be taken in your wallet. · Do not take other medications without consulting your doctor. NOTIFY YOUR PHYSICIAN FOR ANY OF THE FOLLOWING:   Fever over 101 degrees for 24 hours. Chest pain, shortness of breath, fever, chills, nausea, vomiting, diarrhea, change in mentation, falling, weakness, bleeding. Severe pain or pain not relieved by medications. Or, any other signs or symptoms that you may have questions about.       DISPOSITION:   x Home With:   OT  PT  HH  RN       SNF/Inpatient Rehab/LTAC    Independent/assisted living    Hospice    Other:     CDMP Checked:   Yes x     PROBLEM LIST Updated:  Yes x       Signed:   Leena Saunders MD  1/9/2018  11:51 AM

## 2018-01-09 NOTE — PROGRESS NOTES
OPAT  1) Care of Midline including biopatch  2) Ceftriaxone 2 grams IV Every 12 hours until 1/22/18  3) CBC/CMP on 1/15 and 1/23/18  4) remove midline on 1/23/18  5) Fax results to [phone] promptly  6) call Lisa Nguyen on [phone] with any questions or for critical value

## 2018-01-09 NOTE — PROGRESS NOTES
Cardiology Progress Note            Admit Date: 1/2/2018  Admit Diagnosis: Bacterial meningitis  Pneumonia  unknown  Date: 1/9/2018     Time: 11:34 AM       Assessment and Plan     1. Methemoglobinemia   -Suspect occurred due to use of hurricaine/benzocaine spray for DAVI  -D/W pharmacy to denote allergy to benzocaine in chart.  -Last methemoglobin level 0.9 s/p methylene blue transfusion  -Appreciate pulmonary assistance with management. 2. S/p DAVI: no vegetations on any of 4 valves. Mild MR and AR      Subjective:   [facility] denies chest pain, SOB. Wants to go home. Objective:      Physical Exam:                Visit Vitals    /70    Pulse (!) 59    Temp 98.2 °F (36.8 °C)    Resp 15    Ht 5' 8\" (1.727 m)    Wt 91 kg (200 lb 9.6 oz)    SpO2 96%    BMI 30.5 kg/m2          General Appearance:   Well developed, alert and oriented x 3, and   individual in no acute distress. Ears/Nose/Mouth/Throat:    Hearing grossly normal.         Neck:  Supple. Chest:    Lungs clear to auscultation bilaterally. Cardiovascular:    Regular rate and rhythm, S1, S2 normal, no murmur. Abdomen:    Soft, non-tender, bowel sounds are active. Extremities:  No edema bilaterally. Skin:  Warm and dry.  Color pink     Telemetry: normal sinus rhythm          Data Review:    Labs:    Recent Results (from the past 24 hour(s))   POC G3 - PUL    Collection Time: 01/08/18  1:43 PM   Result Value Ref Range    FIO2 (POC) 100 %    pH (POC) 7.436 7.35 - 7.45      pCO2 (POC) 34.8 (L) 35.0 - 45.0 MMHG    pO2 (POC) 260 (H) 80 - 100 MMHG    HCO3 (POC) 23.4 22 - 26 MMOL/L    sO2 (POC) 100 (H) 92 - 97 %    Base deficit (POC) 1 mmol/L    Site LEFT RADIAL      Device: Non rebreather      Flow rate (POC) 15 L/M    Allens test (POC) YES      Specimen type (POC) ARTERIAL     CBC W/O DIFF    Collection Time: 01/09/18  5:40 AM   Result Value Ref Range WBC 9.9 4.1 - 11.1 K/uL    RBC 3.85 (L) 4.10 - 5.70 M/uL    HGB 11.4 (L) 12.1 - 17.0 g/dL    HCT 34.2 (L) 36.6 - 50.3 %    MCV 88.8 80.0 - 99.0 FL    MCH 29.6 26.0 - 34.0 PG    MCHC 33.3 30.0 - 36.5 g/dL    RDW 12.9 11.5 - 14.5 %    PLATELET 716 843 - 239 K/uL   METABOLIC PANEL, BASIC    Collection Time: 01/09/18  5:40 AM   Result Value Ref Range    Sodium 138 136 - 145 mmol/L    Potassium 3.9 3.5 - 5.1 mmol/L    Chloride 106 97 - 108 mmol/L    CO2 27 21 - 32 mmol/L    Anion gap 5 5 - 15 mmol/L    Glucose 101 (H) 65 - 100 mg/dL    BUN 12 6 - 20 MG/DL    Creatinine 0.82 0.70 - 1.30 MG/DL    BUN/Creatinine ratio 15 12 - 20      GFR est AA >60 >60 ml/min/1.73m2    GFR est non-AA >60 >60 ml/min/1.73m2    Calcium 7.8 (L) 8.5 - 10.1 MG/DL          Radiology:        Current Facility-Administered Medications   Medication Dose Route Frequency    albuterol (PROVENTIL VENTOLIN) nebulizer solution 1.25 mg  1.25 mg Nebulization Q6H PRN    guaiFENesin-codeine (ROBITUSSIN AC) 100-10 mg/5 mL solution 5 mL  5 mL Oral Q4H PRN    butalbital-acetaminophen-caffeine (FIORICET, ESGIC) -40 mg per tablet 1 Tab  1 Tab Oral Q4H PRN    fluticasone (FLONASE) 50 mcg/actuation nasal spray 2 Spray  2 Spray Both Nostrils DAILY    tamsulosin (FLOMAX) capsule 0.4 mg  0.4 mg Oral DAILY    losartan (COZAAR) tablet 50 mg  50 mg Oral DAILY    pantoprazole (PROTONIX) tablet 40 mg  40 mg Oral ACB    ciprofloxacin-dexamethasone (CIPRODEX) 0.3-0.1 % otic suspension 4 Drop  4 Drop Right Ear BID    sodium chloride (NS) flush 5-10 mL  5-10 mL IntraVENous Q8H    sodium chloride (NS) flush 5-10 mL  5-10 mL IntraVENous PRN    0.9% sodium chloride infusion  75 mL/hr IntraVENous CONTINUOUS    HYDROcodone-acetaminophen (NORCO) 5-325 mg per tablet 1 Tab  1 Tab Oral Q4H PRN    HYDROmorphone (PF) (DILAUDID) injection 0.5 mg  0.5 mg IntraVENous Q4H PRN    naloxone (NARCAN) injection 0.4 mg  0.4 mg IntraVENous PRN    heparin (porcine) injection 5,000 Units  5,000 Units SubCUTAneous Q8H    haloperidol lactate (HALDOL) injection 1 mg  1 mg IntraVENous Q4H PRN    cefTRIAXone (ROCEPHIN) 2 g in 0.9% sodium chloride (MBP/ADV) 50 mL  2 g IntraVENous Q12H          Manju Mills, JANAE     [facility] [address] [place] [address], [address] [place] [address]   [phone]      Pt was dc before Cards attending rds  The pharmacy indicated no further txment needed as did pulmonary  DAVI was normal no CV issues

## 2018-01-10 ENCOUNTER — HOME CARE VISIT (OUTPATIENT)
Dept: SCHEDULING | Facility: HOME HEALTH | Age: 68
End: 2018-01-10
Payer: MEDICARE

## 2018-01-10 ENCOUNTER — HOME CARE VISIT (OUTPATIENT)
Dept: HOME HEALTH SERVICES | Facility: HOME HEALTH | Age: 68
End: 2018-01-10

## 2018-01-10 ENCOUNTER — DOCUMENTATION ONLY (OUTPATIENT)
Dept: INTERNAL MEDICINE CLINIC | Age: 68
End: 2018-01-10

## 2018-01-10 PROCEDURE — G0299 HHS/HOSPICE OF RN EA 15 MIN: HCPCS

## 2018-01-10 NOTE — PROGRESS NOTES
Visiting nurse and then wife called to say that he had a temp of 101 today. 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   Told wife to get mucinex, incentive spirometry  Monitor temp and if it recurs to take to his PCP to test for virus  If he has other symptoms like headache/vomiting/then he would got to ED

## 2018-01-11 ENCOUNTER — APPOINTMENT (OUTPATIENT)
Dept: GENERAL RADIOLOGY | Age: 68
DRG: 871 | End: 2018-01-11
Attending: EMERGENCY MEDICINE
Payer: MEDICARE

## 2018-01-11 ENCOUNTER — APPOINTMENT (OUTPATIENT)
Dept: CT IMAGING | Age: 68
DRG: 871 | End: 2018-01-11
Attending: EMERGENCY MEDICINE
Payer: MEDICARE

## 2018-01-11 ENCOUNTER — HOSPITAL ENCOUNTER (INPATIENT)
Age: 68
LOS: 5 days | Discharge: HOME HEALTH | DRG: 871 | End: 2018-01-16
Attending: EMERGENCY MEDICINE | Admitting: HOSPITALIST
Payer: MEDICARE

## 2018-01-11 VITALS
HEART RATE: 78 BPM | DIASTOLIC BLOOD PRESSURE: 74 MMHG | RESPIRATION RATE: 20 BRPM | OXYGEN SATURATION: 97 % | SYSTOLIC BLOOD PRESSURE: 132 MMHG | TEMPERATURE: 98.4 F

## 2018-01-11 DIAGNOSIS — G00.9 BACTERIAL MENINGITIS: ICD-10-CM

## 2018-01-11 DIAGNOSIS — A41.9 SEPSIS, DUE TO UNSPECIFIED ORGANISM: Primary | ICD-10-CM

## 2018-01-11 PROBLEM — G03.9 MENINGITIS: Status: ACTIVE | Noted: 2018-01-11

## 2018-01-11 LAB
ALBUMIN SERPL-MCNC: 2.6 G/DL (ref 3.5–5)
ALBUMIN/GLOB SERPL: 0.7 {RATIO} (ref 1.1–2.2)
ALP SERPL-CCNC: 92 U/L (ref 45–117)
ALT SERPL-CCNC: 44 U/L (ref 12–78)
ANION GAP SERPL CALC-SCNC: 10 MMOL/L (ref 5–15)
APPEARANCE CSF: CLEAR
APPEARANCE CSF: CLEAR
APPEARANCE UR: CLEAR
AST SERPL-CCNC: 24 U/L (ref 15–37)
ATRIAL RATE: 92 BPM
B PERT DNA SPEC QL NAA+PROBE: NOT DETECTED
BACTERIA SPEC CULT: NORMAL
BACTERIA URNS QL MICRO: NEGATIVE /HPF
BASOPHILS # BLD: 0 K/UL (ref 0–0.1)
BASOPHILS NFR BLD: 0 % (ref 0–1)
BILIRUB SERPL-MCNC: 0.8 MG/DL (ref 0.2–1)
BILIRUB UR QL: NEGATIVE
BUN SERPL-MCNC: 10 MG/DL (ref 6–20)
BUN/CREAT SERPL: 11 (ref 12–20)
C DIFF TOX GENS STL QL NAA+PROBE: NEGATIVE
C PNEUM DNA SPEC QL NAA+PROBE: NOT DETECTED
CALCIUM SERPL-MCNC: 8 MG/DL (ref 8.5–10.1)
CALCULATED P AXIS, ECG09: 41 DEGREES
CALCULATED R AXIS, ECG10: 20 DEGREES
CALCULATED T AXIS, ECG11: 54 DEGREES
CHLORIDE SERPL-SCNC: 100 MMOL/L (ref 97–108)
CO2 SERPL-SCNC: 24 MMOL/L (ref 21–32)
COLOR CSF: COLORLESS
COLOR CSF: COLORLESS
COLOR SPUN CSF: COLORLESS
COLOR SPUN CSF: COLORLESS
COLOR UR: NORMAL
CREAT SERPL-MCNC: 0.94 MG/DL (ref 0.7–1.3)
DIAGNOSIS, 93000: NORMAL
DIFFERENTIAL METHOD BLD: ABNORMAL
EOSINOPHIL # BLD: 0.3 K/UL (ref 0–0.4)
EOSINOPHIL NFR BLD: 2 % (ref 0–7)
EPITH CASTS URNS QL MICRO: NORMAL /LPF
ERYTHROCYTE [DISTWIDTH] IN BLOOD BY AUTOMATED COUNT: 13.1 % (ref 11.5–14.5)
FLUAV AG NPH QL IA: POSITIVE
FLUAV H1 2009 PAND RNA SPEC QL NAA+PROBE: NOT DETECTED
FLUAV H1 RNA SPEC QL NAA+PROBE: NOT DETECTED
FLUAV H3 RNA SPEC QL NAA+PROBE: DETECTED
FLUAV SUBTYP SPEC NAA+PROBE: NOT DETECTED
FLUBV AG NOSE QL IA: NEGATIVE
FLUBV RNA SPEC QL NAA+PROBE: NOT DETECTED
GLOBULIN SER CALC-MCNC: 3.8 G/DL (ref 2–4)
GLUCOSE BLD STRIP.AUTO-MCNC: 74 MG/DL (ref 65–100)
GLUCOSE BLD STRIP.AUTO-MCNC: 87 MG/DL (ref 65–100)
GLUCOSE CSF-MCNC: 37 MG/DL (ref 40–70)
GLUCOSE SERPL-MCNC: 125 MG/DL (ref 65–100)
GLUCOSE UR STRIP.AUTO-MCNC: NEGATIVE MG/DL
GRAM STN SPEC: NORMAL
GRAM STN SPEC: NORMAL
HADV DNA SPEC QL NAA+PROBE: NOT DETECTED
HCOV 229E RNA SPEC QL NAA+PROBE: NOT DETECTED
HCOV HKU1 RNA SPEC QL NAA+PROBE: NOT DETECTED
HCOV NL63 RNA SPEC QL NAA+PROBE: NOT DETECTED
HCOV OC43 RNA SPEC QL NAA+PROBE: NOT DETECTED
HCT VFR BLD AUTO: 36 % (ref 36.6–50.3)
HEMOCCULT STL QL: POSITIVE
HGB BLD-MCNC: 11.8 G/DL (ref 12.1–17)
HGB UR QL STRIP: NEGATIVE
HMPV RNA SPEC QL NAA+PROBE: NOT DETECTED
HPIV1 RNA SPEC QL NAA+PROBE: NOT DETECTED
HPIV2 RNA SPEC QL NAA+PROBE: NOT DETECTED
HPIV3 RNA SPEC QL NAA+PROBE: NOT DETECTED
HPIV4 RNA SPEC QL NAA+PROBE: NOT DETECTED
KETONES UR QL STRIP.AUTO: NEGATIVE MG/DL
LACTATE SERPL-SCNC: 1 MMOL/L (ref 0.4–2)
LEUKOCYTE ESTERASE UR QL STRIP.AUTO: NEGATIVE
LYMPHOCYTES # BLD: 0.5 K/UL (ref 0.8–3.5)
LYMPHOCYTES NFR BLD: 3 % (ref 12–49)
LYMPHOCYTES NFR CSF MANUAL: 35 %
LYMPHOCYTES NFR CSF MANUAL: 37 %
M PNEUMO DNA SPEC QL NAA+PROBE: NOT DETECTED
MACROPHAGES NFR CSF MANUAL: 11 %
MACROPHAGES NFR CSF MANUAL: 6 %
MCH RBC QN AUTO: 29.1 PG (ref 26–34)
MCHC RBC AUTO-ENTMCNC: 32.8 G/DL (ref 30–36.5)
MCV RBC AUTO: 88.9 FL (ref 80–99)
MONOCYTES # BLD: 0.3 K/UL (ref 0–1)
MONOCYTES NFR BLD: 2 % (ref 5–13)
NEUTROPHILS NFR CSF MANUAL: 54 % (ref 0–6)
NEUTROPHILS NFR CSF MANUAL: 57 % (ref 0–6)
NEUTS BAND NFR BLD MANUAL: 1 % (ref 0–6)
NEUTS SEG # BLD: 13.9 K/UL (ref 1.8–8)
NEUTS SEG NFR BLD: 92 % (ref 32–75)
NITRITE UR QL STRIP.AUTO: NEGATIVE
P-R INTERVAL, ECG05: 172 MS
PH UR STRIP: 6 [PH] (ref 5–8)
PLATELET # BLD AUTO: 386 K/UL (ref 150–400)
POTASSIUM SERPL-SCNC: 3.4 MMOL/L (ref 3.5–5.1)
PROT CSF-MCNC: 92 MG/DL (ref 15–45)
PROT SERPL-MCNC: 6.4 G/DL (ref 6.4–8.2)
PROT UR STRIP-MCNC: NEGATIVE MG/DL
Q-T INTERVAL, ECG07: 354 MS
QRS DURATION, ECG06: 92 MS
QTC CALCULATION (BEZET), ECG08: 437 MS
RBC # BLD AUTO: 4.05 M/UL (ref 4.1–5.7)
RBC # CSF: 0 /CU MM
RBC # CSF: 1 /CU MM
RBC #/AREA URNS HPF: NORMAL /HPF (ref 0–5)
RBC MORPH BLD: ABNORMAL
RSV RNA SPEC QL NAA+PROBE: NOT DETECTED
RV+EV RNA SPEC QL NAA+PROBE: NOT DETECTED
SERVICE CMNT-IMP: NORMAL
SODIUM SERPL-SCNC: 134 MMOL/L (ref 136–145)
SP GR UR REFRACTOMETRY: 1.01 (ref 1–1.03)
TUBE # CSF: 1
TUBE # CSF: 3
UR CULT HOLD, URHOLD: NORMAL
UROBILINOGEN UR QL STRIP.AUTO: 0.2 EU/DL (ref 0.2–1)
VENTRICULAR RATE, ECG03: 92 BPM
WBC # BLD AUTO: 15 K/UL (ref 4.1–11.1)
WBC # CSF: 66 /CU MM (ref 0–5)
WBC # CSF: 72 /CU MM (ref 0–5)
WBC URNS QL MICRO: NORMAL /HPF (ref 0–4)

## 2018-01-11 PROCEDURE — 87493 C DIFF AMPLIFIED PROBE: CPT | Performed by: HOSPITALIST

## 2018-01-11 PROCEDURE — 83605 ASSAY OF LACTIC ACID: CPT | Performed by: EMERGENCY MEDICINE

## 2018-01-11 PROCEDURE — 74011250636 HC RX REV CODE- 250/636: Performed by: HOSPITALIST

## 2018-01-11 PROCEDURE — 81001 URINALYSIS AUTO W/SCOPE: CPT | Performed by: INTERNAL MEDICINE

## 2018-01-11 PROCEDURE — 96365 THER/PROPH/DIAG IV INF INIT: CPT

## 2018-01-11 PROCEDURE — 36415 COLL VENOUS BLD VENIPUNCTURE: CPT | Performed by: EMERGENCY MEDICINE

## 2018-01-11 PROCEDURE — 96368 THER/DIAG CONCURRENT INF: CPT

## 2018-01-11 PROCEDURE — 80053 COMPREHEN METABOLIC PANEL: CPT | Performed by: EMERGENCY MEDICINE

## 2018-01-11 PROCEDURE — 96361 HYDRATE IV INFUSION ADD-ON: CPT

## 2018-01-11 PROCEDURE — 85025 COMPLETE CBC W/AUTO DIFF WBC: CPT | Performed by: EMERGENCY MEDICINE

## 2018-01-11 PROCEDURE — 87804 INFLUENZA ASSAY W/OPTIC: CPT | Performed by: INTERNAL MEDICINE

## 2018-01-11 PROCEDURE — 89050 BODY FLUID CELL COUNT: CPT | Performed by: EMERGENCY MEDICINE

## 2018-01-11 PROCEDURE — 82945 GLUCOSE OTHER FLUID: CPT | Performed by: EMERGENCY MEDICINE

## 2018-01-11 PROCEDURE — 99285 EMERGENCY DEPT VISIT HI MDM: CPT

## 2018-01-11 PROCEDURE — 74011250637 HC RX REV CODE- 250/637: Performed by: HOSPITALIST

## 2018-01-11 PROCEDURE — 74011250637 HC RX REV CODE- 250/637: Performed by: EMERGENCY MEDICINE

## 2018-01-11 PROCEDURE — 74011000258 HC RX REV CODE- 258: Performed by: EMERGENCY MEDICINE

## 2018-01-11 PROCEDURE — 77003 FLUOROGUIDE FOR SPINE INJECT: CPT

## 2018-01-11 PROCEDURE — 75810000133 HC LUMBAR PUNCTURE

## 2018-01-11 PROCEDURE — 65270000029 HC RM PRIVATE

## 2018-01-11 PROCEDURE — 82962 GLUCOSE BLOOD TEST: CPT

## 2018-01-11 PROCEDURE — 74011250636 HC RX REV CODE- 250/636: Performed by: EMERGENCY MEDICINE

## 2018-01-11 PROCEDURE — 009U3ZX DRAINAGE OF SPINAL CANAL, PERCUTANEOUS APPROACH, DIAGNOSTIC: ICD-10-PCS | Performed by: RADIOLOGY

## 2018-01-11 PROCEDURE — 87798 DETECT AGENT NOS DNA AMP: CPT | Performed by: INTERNAL MEDICINE

## 2018-01-11 PROCEDURE — 84157 ASSAY OF PROTEIN OTHER: CPT | Performed by: EMERGENCY MEDICINE

## 2018-01-11 PROCEDURE — 87205 SMEAR GRAM STAIN: CPT | Performed by: EMERGENCY MEDICINE

## 2018-01-11 PROCEDURE — 70450 CT HEAD/BRAIN W/O DYE: CPT

## 2018-01-11 PROCEDURE — 96375 TX/PRO/DX INJ NEW DRUG ADDON: CPT

## 2018-01-11 PROCEDURE — 74011250637 HC RX REV CODE- 250/637: Performed by: INTERNAL MEDICINE

## 2018-01-11 PROCEDURE — 71045 X-RAY EXAM CHEST 1 VIEW: CPT

## 2018-01-11 PROCEDURE — 77030003666 HC NDL SPINAL BD -A

## 2018-01-11 PROCEDURE — 82272 OCCULT BLD FECES 1-3 TESTS: CPT | Performed by: INTERNAL MEDICINE

## 2018-01-11 PROCEDURE — 87040 BLOOD CULTURE FOR BACTERIA: CPT | Performed by: EMERGENCY MEDICINE

## 2018-01-11 PROCEDURE — 93005 ELECTROCARDIOGRAM TRACING: CPT

## 2018-01-11 RX ORDER — SODIUM CHLORIDE 0.9 % (FLUSH) 0.9 %
5-10 SYRINGE (ML) INJECTION AS NEEDED
Status: DISCONTINUED | OUTPATIENT
Start: 2018-01-11 | End: 2018-01-16 | Stop reason: HOSPADM

## 2018-01-11 RX ORDER — PANTOPRAZOLE SODIUM 40 MG/1
40 TABLET, DELAYED RELEASE ORAL DAILY
Status: DISCONTINUED | OUTPATIENT
Start: 2018-01-11 | End: 2018-01-16 | Stop reason: HOSPADM

## 2018-01-11 RX ORDER — OXYCODONE HYDROCHLORIDE 5 MG/1
5 TABLET ORAL
Status: DISCONTINUED | OUTPATIENT
Start: 2018-01-11 | End: 2018-01-16 | Stop reason: HOSPADM

## 2018-01-11 RX ORDER — VANCOMYCIN 2 GRAM/500 ML IN 0.9 % SODIUM CHLORIDE INTRAVENOUS
2000
Status: COMPLETED | OUTPATIENT
Start: 2018-01-11 | End: 2018-01-11

## 2018-01-11 RX ORDER — OXYMETAZOLINE HCL 0.05 %
2 SPRAY, NON-AEROSOL (ML) NASAL
Status: DISCONTINUED | OUTPATIENT
Start: 2018-01-11 | End: 2018-01-16 | Stop reason: HOSPADM

## 2018-01-11 RX ORDER — TAMSULOSIN HYDROCHLORIDE 0.4 MG/1
0.4 CAPSULE ORAL DAILY
Status: DISCONTINUED | OUTPATIENT
Start: 2018-01-11 | End: 2018-01-16 | Stop reason: HOSPADM

## 2018-01-11 RX ORDER — OSELTAMIVIR PHOSPHATE 75 MG/1
75 CAPSULE ORAL 2 TIMES DAILY
Status: COMPLETED | OUTPATIENT
Start: 2018-01-11 | End: 2018-01-16

## 2018-01-11 RX ORDER — ONDANSETRON 4 MG/1
4 TABLET, ORALLY DISINTEGRATING ORAL
Status: DISCONTINUED | OUTPATIENT
Start: 2018-01-11 | End: 2018-01-16 | Stop reason: HOSPADM

## 2018-01-11 RX ORDER — POTASSIUM CHLORIDE AND SODIUM CHLORIDE 900; 300 MG/100ML; MG/100ML
INJECTION, SOLUTION INTRAVENOUS CONTINUOUS
Status: DISCONTINUED | OUTPATIENT
Start: 2018-01-11 | End: 2018-01-16 | Stop reason: HOSPADM

## 2018-01-11 RX ORDER — CIPROFLOXACIN AND DEXAMETHASONE 3; 1 MG/ML; MG/ML
4 SUSPENSION/ DROPS AURICULAR (OTIC) 2 TIMES DAILY
Status: DISCONTINUED | OUTPATIENT
Start: 2018-01-11 | End: 2018-01-16 | Stop reason: HOSPADM

## 2018-01-11 RX ORDER — OXYMETAZOLINE HCL 0.05 %
2 SPRAY, NON-AEROSOL (ML) NASAL 2 TIMES DAILY
Status: DISCONTINUED | OUTPATIENT
Start: 2018-01-11 | End: 2018-01-11

## 2018-01-11 RX ORDER — CEFTRIAXONE 1 G/1
2 INJECTION, POWDER, FOR SOLUTION INTRAMUSCULAR; INTRAVENOUS EVERY 12 HOURS
COMMUNITY

## 2018-01-11 RX ORDER — SODIUM CHLORIDE 0.9 % (FLUSH) 0.9 %
5-10 SYRINGE (ML) INJECTION EVERY 8 HOURS
Status: DISCONTINUED | OUTPATIENT
Start: 2018-01-11 | End: 2018-01-16 | Stop reason: HOSPADM

## 2018-01-11 RX ORDER — FLUTICASONE PROPIONATE 50 MCG
2 SPRAY, SUSPENSION (ML) NASAL DAILY
Status: DISCONTINUED | OUTPATIENT
Start: 2018-01-11 | End: 2018-01-16 | Stop reason: HOSPADM

## 2018-01-11 RX ORDER — ACETAMINOPHEN 500 MG
1000 TABLET ORAL
Status: COMPLETED | OUTPATIENT
Start: 2018-01-11 | End: 2018-01-11

## 2018-01-11 RX ADMIN — ACETAMINOPHEN 650 MG: 650 SOLUTION ORAL at 17:56

## 2018-01-11 RX ADMIN — CIPROFLOXACIN AND DEXAMETHASONE 4 DROP: 3; 1 SUSPENSION/ DROPS AURICULAR (OTIC) at 11:21

## 2018-01-11 RX ADMIN — SODIUM CHLORIDE AND POTASSIUM CHLORIDE: 9; 2.98 INJECTION, SOLUTION INTRAVENOUS at 11:12

## 2018-01-11 RX ADMIN — ACETAMINOPHEN 1000 MG: 500 TABLET, FILM COATED ORAL at 08:21

## 2018-01-11 RX ADMIN — TAMSULOSIN HYDROCHLORIDE 0.4 MG: 0.4 CAPSULE ORAL at 17:56

## 2018-01-11 RX ADMIN — CEFTRIAXONE 2 G: 2 INJECTION, POWDER, FOR SOLUTION INTRAMUSCULAR; INTRAVENOUS at 08:10

## 2018-01-11 RX ADMIN — CEFTRIAXONE 2 G: 2 INJECTION, POWDER, FOR SOLUTION INTRAMUSCULAR; INTRAVENOUS at 17:55

## 2018-01-11 RX ADMIN — Medication 10 ML: at 17:56

## 2018-01-11 RX ADMIN — SODIUM CHLORIDE 2667 ML: 900 INJECTION, SOLUTION INTRAVENOUS at 06:30

## 2018-01-11 RX ADMIN — OSELTAMIVIR PHOSPHATE 75 MG: 75 CAPSULE ORAL at 17:55

## 2018-01-11 RX ADMIN — PANTOPRAZOLE SODIUM 40 MG: 40 TABLET, DELAYED RELEASE ORAL at 17:56

## 2018-01-11 RX ADMIN — VANCOMYCIN HYDROCHLORIDE 2000 MG: 10 INJECTION, POWDER, LYOPHILIZED, FOR SOLUTION INTRAVENOUS at 08:23

## 2018-01-11 RX ADMIN — CIPROFLOXACIN AND DEXAMETHASONE 4 DROP: 3; 1 SUSPENSION/ DROPS AURICULAR (OTIC) at 17:56

## 2018-01-11 RX ADMIN — AZITHROMYCIN MONOHYDRATE 500 MG: 500 INJECTION, POWDER, LYOPHILIZED, FOR SOLUTION INTRAVENOUS at 11:12

## 2018-01-11 RX ADMIN — FLUTICASONE PROPIONATE 2 SPRAY: 50 SPRAY, METERED NASAL at 11:21

## 2018-01-11 NOTE — PROGRESS NOTES
TRANSFER - OUT REPORT:    Verbal report given to MADAY Oh(name) on Vinayak Wolf  being transferred to [facility] bed 569(unit) for routine progression of care       Report consisted of patients Situation, Background, Assessment and   Recommendations(SBAR). Information from the following report(s) SBAR, Kardex, ED Summary and Recent Results was reviewed with the receiving nurse. Lines:   Peripheral IV 01/11/18 Left Hand (Active)       Peripheral IV 01/11/18 Right Hand (Active)        Opportunity for questions and clarification was provided. Patient was found to be OCCULT  STOOL Positive- This RN informed Infectious disease physician- no new orders at this time.     Patient transported with:   [employer]

## 2018-01-11 NOTE — PROGRESS NOTES
Noted pt's current treatment in ED. Pt recently discharged home and was followed by [facility] and [facility] in [address] neck and followed by Dr. Shalini Brandon. Per Dr. Shalini Brandon, infectious disease in note dated yesterday 1/10/18,     \"Visiting nurse and then wife called to say that he had a temp of 101 today. 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 Told wife to get mucinex, incentive spirometry  Monitor temp and if it recurs to take to his PCP to test for virus  If he has other symptoms like headache/vomiting/then he would got to ED\"    ED CM can assist as needed.     Kayren Klinefelter, MSW

## 2018-01-11 NOTE — IP AVS SNAPSHOT
[address] [facility] 
[phone] Patient: Kentrell Lai MRN: [id number] KFI:[id number]/2/[id number] About your hospitalization You were admitted on:  January 11, 2018 [facility] on:  January 16, 2018 Why you were hospitalized Your primary diagnosis was:  Sepsis (Hcc) Your diagnoses also included:  Meningitis Follow-up Information Follow up With Details Comments Contact Info Nicole Escamilla MD   [address] [place] [address] [address] 401 [facility] 
[phone] 111 [facility] skilled nursing [facility] IV antibiotics. [phone] [address]  For home IV antibiotics 28[address] [facility]. [address] [place] [address] [phone] Your Scheduled Appointments Wednesday January 17, 2018 To Be Determined START OF CARE with [facility] [address] ([place]) [address] ([place]) Thursday January 18, 2018 To Be Determined ROUTINE with [facility] [address] ([place]) [address] ([place]) Monday January 22, 2018 To Be Determined ROUTINE with [facility] [address] ([place]) [address] ([place]) Thursday January 25, 2018 To Be Determined ROUTINE with [facility] [address] ([place]) [address] ([place]) Discharge Orders None A check cathy indicates which time of day the medication should be taken. My Medications START taking these medications Instructions Each Dose to Equal  
 Morning Noon Evening Bedtime  
 albuterol 90 mcg/actuation inhaler Commonly known as:  PROVENTIL HFA, VENTOLIN HFA, PROAIR HFA Your last dose was: Your next dose is: Take 2 Puffs by inhalation every six (6) hours as needed for Wheezing. 2 Puff  
    
   
   
   
  
 cefTRIAXone 2 gram 2 g, ADDaptor 1 Device IVPB Your last dose was: Your next dose is:    
   
   
 2 g by IntraVENous route every twelve (12) hours every twelve (12) hours. 2 g  
    
   
   
   
  
 guaiFENesin  mg ER tablet Commonly known as:  Edis & Edis Your last dose was: Your next dose is: Take 1 Tab by mouth every twelve (12) hours. 600 mg  
    
   
   
   
  
 ondansetron 4 mg disintegrating tablet Commonly known as:  ZOFRAN ODT Your last dose was: Your next dose is: Take 1 Tab by mouth every four (4) hours as needed. 4 mg  
    
   
   
   
  
 oxyCODONE IR 5 mg immediate release tablet Commonly known as:  Zoë Boykin Your last dose was: Your next dose is: Take 1 Tab by mouth every four (4) hours as needed. Max Daily Amount: 30 mg.  
 5 mg CONTINUE taking these medications Instructions Each Dose to Equal  
 Morning Noon Evening Bedtime AVAPRO 150 mg tablet Generic drug:  irbesartan Your last dose was: Your next dose is: Take 150 mg by mouth nightly. 150 mg  
    
   
   
   
  
 butalbital-acetaminophen-caffeine -40 mg per tablet Commonly known as:  Vale Host Your last dose was: Your next dose is: Take 1 Tab by mouth every four (4) hours as needed for Headache. Indications: TENSION-TYPE HEADACHE  
 1 Tab  
    
   
   
   
  
 cefTRIAXone 1 gram injection Commonly known as:  ROCEPHIN Your last dose was: Your next dose is:    
   
   
 2 g by IntraVENous route every twelve (12) hours every twelve (12) hours. 2 g  
    
   
   
   
  
 ciprofloxacin-dexamethasone 0.3-0.1 % otic suspension Commonly known as:  Frederic Mohan Your last dose was: Your next dose is:    
   
   
 Administer 4 Drops in right ear two (2) times a day for 10 days. Indications: Acute Otitis Media with Tympanostomy Tubes 4 Drop FLOMAX 0.4 mg capsule Generic drug:  tamsulosin Your last dose was: Your next dose is: Take 0.4 mg by mouth daily. 0.4 mg  
    
   
   
   
  
 FLONASE 50 mcg/actuation nasal spray Generic drug:  fluticasone Your last dose was: Your next dose is: 2 Sprays by Both Nostrils route daily. 2 [place] HEPARIN FLUSH 10 unit/mL Kit Generic drug:  heparin (porcine) in ns Your last dose was: Your next dose is:    
   
   
 3 mL by IntraVENous route daily. after final saline flush using s-a-s-h technique 3 mL MARIA ELENA-SYNEPHRINE 12 H SPR (OXYM) 0.05 % nasal spray Generic drug:  oxymetazoline Your last dose was: Your next dose is: 2 Sprays two (2) times daily as needed. 2 Spray NORMAL SALINE FLUSH INJECTION Your last dose was: Your next dose is:    
   
   
 10 mL by IntraVENous route daily. before and after each antibiotic infusion 10 mL Omeprazole delayed release 20 mg tablet Commonly known as:  PRILOSEC D/R Your last dose was: Your next dose is: Take 20 mg by mouth daily. 20 mg Where to Get Your Medications Information on where to get these meds will be given to you by the nurse or doctor. ! Ask your nurse or doctor about these medications  
  albuterol 90 mcg/actuation inhaler cefTRIAXone 2 gram 2 g, ADDaptor 1 Device IVPB  
 guaiFENesin  mg ER tablet  
 ondansetron 4 mg disintegrating tablet  
 oxyCODONE IR 5 mg immediate release tablet Discharge Instructions Patient: Evelyn Macdonald               Sex: male          DOA: 1/11/2018 
  
   
YOB: 1950      Age:  70 y.o.        LOS:  LOS: 5 days             
  
Admit Date: 1/11/2018 
  
Discharge Date: 1/16/2018 
  
Admission Diagnoses: Sepsis [facility]) 
  
Discharge Diagnoses:   
Problem List as of 1/16/2018  Never Reviewed          Codes Class Noted - Resolved  
  * (Principal)Sepsis ([address] ICD-10-CM: A41.9 ICD-9-CM: 038.9, 995.91   1/11/2018 - Present  
     
  Meningitis ICD-10-CM: G03.9 ICD-9-CM: 130. 9   1/11/2018 - Present  
     
  Pneumonia ICD-10-CM: J18.9 ICD-9-CM: 813   1/2/2018 - Present  
     
  Bacterial meningitis ICD-10-CM: G00.9 ICD-9-CM: 320. 9   1/2/2018 - Present  
     
  
  
  
Discharge Medications:    
    
Current Discharge Medication List  
   
    
START taking these medications  
  Details  
guaiFENesin ER (MUCINEX) 600 mg ER tablet Take 1 Tab by mouth every twelve (12) hours. Qty: 30 Tab, Refills: 0  
   
ondansetron (ZOFRAN ODT) 4 mg disintegrating tablet Take 1 Tab by mouth every four (4) hours as needed. Qty: 30 Tab, Refills: 0  
   
oxyCODONE IR (ROXICODONE) 5 mg immediate release tablet Take 1 Tab by mouth every four (4) hours as needed. Max Daily Amount: 30 mg. 
Qty: 12 Tab, Refills: 0  
  Associated Diagnoses: Bacterial meningitis  
   
cefTRIAXone 2 gram 2 g, ADDaptor 1 Device IVPB 2 g by IntraVENous route every twelve (12) hours every twelve (12) hours.  
Qty: 12 Dose, Refills: 0  
   
   
   
CONTINUE these medications which have NOT CHANGED  
  Details  
cefTRIAXone (ROCEPHIN) 1 gram injection 2 g by IntraVENous route every twelve (12) hours every twelve (12) hours.  
   
0.9 % SODIUM CHLORIDE (NORMAL SALINE FLUSH INJECTION) 10 mL by IntraVENous route daily. before and after each antibiotic infusion  
   
heparin, porcine, in ns (HEPARIN FLUSH) 10 unit/mL kit 3 mL by IntraVENous route daily. after final saline flush using s-a-s-h technique  
   
ciprofloxacin-dexamethasone (CIPRODEX) 0.3-0.1 % otic suspension Administer 4 Drops in right ear two (2) times a day for 10 days. Indications: Acute Otitis Media with Tympanostomy Tubes Qty: 7.5 mL, Refills: 0  
   
butalbital-acetaminophen-caffeine (FIORICET, ESGIC) -40 mg per tablet Take 1 Tab by mouth every four (4) hours as needed for Headache. Indications: TENSION-TYPE HEADACHE Qty: 10 Tab, Refills: 0  
   
fluticasone (FLONASE) 50 mcg/actuation nasal spray 2 Sprays by Both Nostrils route daily.  
   
irbesartan (AVAPRO) 150 mg tablet Take 150 mg by mouth nightly.  
   
Omeprazole delayed release (PRILOSEC D/R) 20 mg tablet Take 20 mg by mouth daily.  
   
tamsulosin (FLOMAX) 0.4 mg capsule Take 0.4 mg by mouth daily.  
   
oxymetazoline (MARIA ELENA-SYNEPHRINE 12 H SPR, OXYM,) 0.05 % nasal spray 2 Sprays two (2) times daily as needed.  
   
   
  
  
Follow-up:pcp and infectious disease 
  
Discharge Condition:good 
  
Activity:as tolerated 
  
Diet:heart healthy 
  
Labs: 
Labs: Results:  
     
Chemistry Recent Labs  
    01/16/18 
 6668  01/15/18 
 0403 GLU  99  114* NA  136  137  
K  4.1  4.0  
CL  104  105 CO2  26  23 BUN  6  6 CREA  0.84  0.73 CA  8.6  8.4* AGAP  6  9 BUCR  7*  8*  
   
CBC w/Diff Recent Labs  
    01/16/18 
 2869  01/15/18 
 0403 WBC  7.3  8.2  
RBC  3.58*  3.77* HGB  10.5*  10.9* HCT  31.6*  33.3*  
PLT  262  280 GRANS  74  75 LYMPH  16  18 EOS  2  1  
   
Cardiac Enzymes No results for input(s): CPK, CKND1, XIAO in the last 72 hours. 
  
No lab exists for component: Sinda Coup Coagulation No results for input(s): PTP, INR, APTT in the last 72 hours. 
  
No lab exists for component: INREXT   
 Lipid Panel No results found for: CHOL, CHOLPOCT, CHOLX, CHLST, CHOLV, 188820, HDL, LDL, LDLC, DLDLP, 219850, VLDLC, VLDL, TGLX, TRIGL, TRIGP, TGLPOCT, CHHD, CHHDX  
BNP No results for input(s): BNPP in the last 72 hours. Liver Enzymes No results for input(s): TP, ALB, TBIL, AP, SGOT, GPT in the last 72 hours. 
  
No lab exists for component: DBIL Thyroid Studies No results found for: T4, T3U, TSH, TSHEXT    
  
Imaging: CXR on 1/11/2017 IMPRESSION: Patch of opacification right lung base medially. 
  
 CT head on 1/11/2018 IMPRESSION: No acute intracranial findings. Chronic left parietal scalp thinning 
with coarse dystrophic calcification.  
    
CT chest on 1/12/2018 1. There is persistent right lower lobe pneumonia but this has decreased 
significantly when compared with the CT scan of 1-2-18. Follow-up to resolution 
is suggested. There are bibasilar areas of atelectasis adjacent to the new 
pleural effusions. The central airways are patent. Follow-up to resolution is 
suggested. 2. The apical segmental bronchus arises directly from the right mainstem 
bronchus which is a normal variant. 3. Possible pulmonary artery hypertension.  
4. Subcarinal lymph node is enlarged this could be reactive to the pneumonia in 
the right lower lobe 
  
Consults:  
Infectious disease 
  
Treatment Team: Treatment Team: Attending Provider: Gilma Tony MD; Consulting Provider: Janice Buck MD; Consulting Provider: Alexx Morrissey MD; Hospitalist: Alexx Morrissey MD; Utilization Review: Lisandro Morin RN; Care Manager: Joseph Mclaughlin 
  
Significant Diagnostic Studies:see recent lab results 
  
HISTORY OF PRESENT ILLNESS:  The patient is a 69-year-old gentleman who was discharged from the hospital 2 days ago after he was hospitalized for acute bacterial meningitis. Leora Rose was discharged on the 9th on IV antibiotics via a right PICC line.  Since he left the hospital, he continued spiking a fever.  The same night he was discharged, he had a temperature of 99.5.  He had higher temperature yesterday of 101, and this morning he spiked a temperature of 100.7.  They reached out to the infectious disease doctor yesterday, who advised them to return to the emergency room.  Patient had new symptoms of diarrhea with 2-3 bowel movements since discharge.  No recurrence of fever.  Headache is improved.  No photophobia, nausea or vomiting.  Denies urinary symptoms. Alida Grimm is no redness, swelling and pain around the PICC line and he was compliant with his treatment.   
   
Upon arrival in the ER, he was found to have a temperature of 100.5 and subsequently 102.7, heart rate 107.  His blood pressure was 150/80, respiration of 18, oxygen saturation of 99% on room air.  His blood work showed a leukocytosis with 92% neutrophil.  Serum chemistry shows sodium of 134, potassium of 3.4.  Blood culture and CSF culture collected.  CT of the head is pending.  A portable chest x-ray showed patchy opacification, right lung base, medially, and an EKG showed sinus rhythm.  Infectious Disease was consulted and asked for admission evaluation.  The ED had administered normal saline at 30 mL/kg, Tylenol 1000 mg and antibiotics, erythromycin and his ceftriaxone were continued.  
   
Per review of records, the patient was discharged on ceftriaxone 2 grams b.i.d. to be completed on the 22nd.  During that admission, the patient had otomastoiditis and he underwent tympanomastoidectomy and ear tube right with the placement of the facial nerve electrodes.  During that hospitalization, he developed what was believed to be methemoglobinemia and he was encephalopathic, toxic metabolic. 
  
Hospital Course: 1. Influenza- On Tamiflu and droplet precautions - last dose on 1/16/18 
   
2. Diarrhea-cdiff neg-occult blood positive probably antibiotic associated Diarrhea,added probiotics.  stool WBC neg; stool culture negative. Diarrhea resolved 
   
3. Bacterial meningitis due to pneumococcus being treated since 1/1/18- repeat LP much improved on Rocephin continue till 1/22/18. Previous LP showed LP demonstrated 2300 white cells with 94% neutrophils,  CSF glucose < 10. 
no RBC an total protein >300. GS CSF had many WBC's but no organisms. Repeat LP on admission showed 72 WBC protein 92.  
   
4. Right  otomastoiditis- S/p Right tympanomastoidectomy. Myringotomy tubes 
   
5. Pneumococcus bacteremia with rt pneumonia and meningitis DAVI no endocarditis   
HIV test-negative Immuno compromised due to alcohol Will need pneumococcal vaccines 13 and 23 after completion of antibiotic 
   
6. 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 
   
7. Rt  Lung base opacity- -  Pneumonia  Seen on CT :There is persistent right lower lobe pneumonia but this has decreased 
significantly when compared with the CT scan of 1-2-18. On Rocephin and Zithromax. D/c zithromax. Pt is on ceftriaxone. 
   
8. HTN- continue routine medications 
  
Code status: full 
  
Patient clinically stable for discharge. Case d/w ID(),CM,Patient and Pt's wife 
  
Time for discharge:50 minutes. 
  
Emeterio Carver MD 
January 16, 2018 [employer] Announcement We are excited to announce that we are making your provider's discharge notes available to you in [employer]. You will see these notes when they are completed and signed by the physician that discharged you from your recent hospital stay. If you have any questions or concerns about any information you see in [employer], please call the Health Information Department where you were seen or reach out to your Primary Care Provider for more information about your plan of care. Introducing [address]!    
 Olivier Andrade introduces [employer] patient portal. Now you can access parts of your medical record, email your doctor's office, and request medication refills online. 1. In your internet browser, go to [email or web address] 2. Click on the First Time User? Click Here link in the Sign In box. You will see the New Member Sign Up page. 3. Enter your [employer] Access Code exactly as it appears below. You will not need to use this code after youve completed the sign-up process. If you do not sign up before the expiration date, you must request a new code. · [employer] Access Code: [email or web address] Expires: 4/2/2018 10:47 AM 
 
4. Enter the last four digits of your Social Security Number (xxxx) and Date of Birth (mm/dd/yyyy) as indicated and click Submit. You will be taken to the next sign-up page. 5. Create a [employer] ID. This will be your [employer] login ID and cannot be changed, so think of one that is secure and easy to remember. 6. Create a [employer] password. You can change your password at any time. 7. Enter your Password Reset Question and Answer. This can be used at a later time if you forget your password. 8. Enter your e-mail address. You will receive e-mail notification when new information is available in [address] 9. Click Sign Up. You can now view and download portions of your medical record. 10. Click the Download Summary menu link to download a portable copy of your medical information. If you have questions, please visit the Frequently Asked Questions section of the [employer] website. Remember, [employer] is NOT to be used for urgent needs. For medical emergencies, dial 911. Now available from your iPhone and Android! Unresulted Labs-Please follow up with your PCP about these lab tests Order Current Status GLUCOSE-6-PHOSPHATE DEHYDROGENASE In process CULTURE, CSF W GRAM STAIN Preliminary result Providers Seen During Your Hospitalization Provider Specialty Primary office phone Wolfgang Metz MD Emergency Medicine [phone] Miesha Silverio MD Internal Medicine [phone] Marilee Morrison MD Internal Medicine [phone] Chad Persaud MD Internal Medicine [phone] Jaswinder Nieto MD Internal Medicine [phone] Your Primary Care Physician (PCP) Primary Care Physician Office Phone Office Fax Gab Hastings  You are allergic to the following Allergen Reactions Hurricaine (Benzocaine) Other (comments) Methemoglobinemia Recent Documentation Weight BMI Smoking Status 88.9 kg 29.8 kg/m2 Former Smoker Emergency Contacts Name Discharge Info Relation Home Work Mobile Sebastián Olsen DISCHARGE CAREGIVER [3] Spouse [3] [phone] Patient Belongings The following personal items are in your possession at time of discharge: 
  Dental Appliances: None  Visual Aid: None      Home Medications: None   Jewelry: None  Clothing: With patient    Other Valuables: Cell Phone Please provide this summary of care documentation to your next provider. Signatures-by signing, you are acknowledging that this After Visit Summary has been reviewed with you and you have received a copy. Patient Signature:  ____________________________________________________________ Date:  ____________________________________________________________  
  
[facility] Provider Signature:  ____________________________________________________________ Date:  ____________________________________________________________

## 2018-01-11 NOTE — ED NOTES
Bedside and Verbal shift change report given to [address] (oncoming nurse) by Anderson Briggs RN (offgoing nurse). Report included the following information SBAR, ED Summary, MAR and Med Rec Status.

## 2018-01-11 NOTE — IP AVS SNAPSHOT
1111 [facility] [address] [place] [address] 
[phone] Patient: Stafford Lennox MRN: [id number] MAP:4/2/6988 A check cathy indicates which time of day the medication should be taken. My Medications START taking these medications Instructions Each Dose to Equal  
 Morning Noon Evening Bedtime  
 albuterol 90 mcg/actuation inhaler Commonly known as:  PROVENTIL HFA, VENTOLIN HFA, PROAIR HFA Your last dose was: Your next dose is: Take 2 Puffs by inhalation every six (6) hours as needed for Wheezing. 2 Puff  
    
   
   
   
  
 cefTRIAXone 2 gram 2 g, ADDaptor 1 Device IVPB Your last dose was: Your next dose is:    
   
   
 2 g by IntraVENous route every twelve (12) hours every twelve (12) hours. 2 g  
    
   
   
   
  
 guaiFENesin  mg ER tablet Commonly known as:  Edis & Edis Your last dose was: Your next dose is: Take 1 Tab by mouth every twelve (12) hours. 600 mg  
    
   
   
   
  
 ondansetron 4 mg disintegrating tablet Commonly known as:  ZOFRAN ODT Your last dose was: Your next dose is: Take 1 Tab by mouth every four (4) hours as needed. 4 mg  
    
   
   
   
  
 oxyCODONE IR 5 mg immediate release tablet Commonly known as:  Primitivo Herb Your last dose was: Your next dose is: Take 1 Tab by mouth every four (4) hours as needed. Max Daily Amount: 30 mg.  
 5 mg CONTINUE taking these medications Instructions Each Dose to Equal  
 Morning Noon Evening Bedtime AVAPRO 150 mg tablet Generic drug:  irbesartan Your last dose was: Your next dose is: Take 150 mg by mouth nightly. 150 mg  
    
   
   
   
  
 butalbital-acetaminophen-caffeine -40 mg per tablet Commonly known as:  Consuello Sida Your last dose was: Your next dose is: Take 1 Tab by mouth every four (4) hours as needed for Headache. Indications: TENSION-TYPE HEADACHE  
 1 Tab  
    
   
   
   
  
 cefTRIAXone 1 gram injection Commonly known as:  ROCEPHIN Your last dose was: Your next dose is:    
   
   
 2 g by IntraVENous route every twelve (12) hours every twelve (12) hours. 2 g  
    
   
   
   
  
 ciprofloxacin-dexamethasone 0.3-0.1 % otic suspension Commonly known as:  Gustavo Girard Your last dose was: Your next dose is:    
   
   
 Administer 4 Drops in right ear two (2) times a day for 10 days. Indications: Acute Otitis Media with Tympanostomy Tubes 4 Drop FLOMAX 0.4 mg capsule Generic drug:  tamsulosin Your last dose was: Your next dose is: Take 0.4 mg by mouth daily. 0.4 mg  
    
   
   
   
  
 FLONASE 50 mcg/actuation nasal spray Generic drug:  fluticasone Your last dose was: Your next dose is: 2 Sprays by Both Nostrils route daily. 2 [place] HEPARIN FLUSH 10 unit/mL Kit Generic drug:  heparin (porcine) in ns Your last dose was: Your next dose is:    
   
   
 3 mL by IntraVENous route daily. after final saline flush using s-a-s-h technique 3 mL MARIA ELENA-SYNEPHRINE 12 H SPR (OXYM) 0.05 % nasal spray Generic drug:  oxymetazoline Your last dose was: Your next dose is: 2 Sprays two (2) times daily as needed. 2 Spray NORMAL SALINE FLUSH INJECTION Your last dose was: Your next dose is:    
   
   
 10 mL by IntraVENous route daily. before and after each antibiotic infusion 10 mL Omeprazole delayed release 20 mg tablet Commonly known as:  PRILOSEC D/R Your last dose was: Your next dose is: Take 20 mg by mouth daily.   
 20 mg  
    
   
   
 Where to Get Your Medications Information on where to get these meds will be given to you by the nurse or doctor. ! Ask your nurse or doctor about these medications  
  albuterol 90 mcg/actuation inhaler  
 cefTRIAXone 2 gram 2 g, ADDaptor 1 Device IVPB  
 guaiFENesin  mg ER tablet  
 ondansetron 4 mg disintegrating tablet  
 oxyCODONE IR 5 mg immediate release tablet

## 2018-01-11 NOTE — ED PROVIDER NOTES
Patient is a 70 y.o. male presenting with fever and morbid obesity. Fever      Morbid Obesity      The patient is a 79-year-old white male just discharged 2 days prior to admission after 9 day course of treatment for acute bacterial meningitis secondary to otitis media and mastoiditis and pneumococcal pneumonia. Nila Hemphill He was discharged home on IV Rocephin but he has been spiking fevers to 101 for the past 2 days. I spoke with Dr. Kate Subramanian recommended home reevaluation emergency room and possible readmission. He states he's been doing better at home and has no headache at this time he is still coughing somewhat. He has had no nausea vomiting he does not appear to be toxic this but he has a fever in the emergency department. Past Medical History:   Diagnosis Date    Bacterial meningitis     GERD (gastroesophageal reflux disease)     Hypertension        History reviewed. No pertinent surgical history. History reviewed. No pertinent family history. Social History     Social History    Marital status:      Spouse name: N/A    Number of children: N/A    Years of education: N/A     Occupational History    Not on file. Social History Main Topics    Smoking status: Former Smoker    Smokeless tobacco: Never Used    Alcohol use No    Drug use: No    Sexual activity: Not on file     Other Topics Concern    Not on file     Social History Narrative         ALLERGIES: Hurricaine [benzocaine]    Review of Systems   Constitutional: Positive for fever. All other systems reviewed and are negative. Vitals:    01/11/18 0625   BP: 150/80   Pulse: (!) 107   Resp: 18   Temp: (!) 100.5 °F (38.1 °C)   SpO2: 99%   Weight: 88.9 kg (196 lb)            Physical Exam   Constitutional: He is oriented to person, place, and time. He appears well-developed and well-nourished. HENT:   Head: Normocephalic and atraumatic. Mouth/Throat: Oropharynx is clear and moist. No oropharyngeal exudate.    Eyes: Conjunctivae are normal. No scleral icterus. Neck: Neck supple. No thyromegaly present. supple   Cardiovascular: Normal rate, regular rhythm and normal heart sounds. Exam reveals no gallop and no friction rub. No murmur heard. Pulmonary/Chest: Effort normal and breath sounds normal. No stridor. No respiratory distress. He has no wheezes. He has no rales. Abdominal: Soft. Bowel sounds are normal. There is no tenderness. There is no rebound and no guarding. Musculoskeletal: Normal range of motion. Lymphadenopathy:     He has no cervical adenopathy. Neurological: He is alert and oriented to person, place, and time. Skin: Skin is warm and dry. Psychiatric: He has a normal mood and affect. [facility]  ED Course       Procedures  ED EKG interpretation:  Rhythm: This EKG was interpreted by Inderjit Mock MD,ED Provider. nsr rate 92 no st changes     7:58 AM  Patient was discharged 2 days ago after an admission for meningitis. He is still spiking fevers to 101. He is currently being treated for pneumococcal meningitis and pneumonia with Rocephin 2g q12. Spoke with Dr. Robe Ferreira who recommends admission to the hospitalist and re-LP. Patient has no meningismus at this time. LP has been ordered through fluoro. 8:39 AM  Patient developed methemoglobinemia after receiving benzocaine spray for DAVI. Can probably use lidocaine for local anesthetic - am checking with pharmacy. Will make Dr. Cielo Braun aware. Pharmacy does not see any contraindication to lidocaine.

## 2018-01-11 NOTE — H&P
H&P dictated:434103          A/p    Returned with sepsis while on IV ceftriaxone via right arm PICC after recent hospitalization for meningitis. He was discharged on 1/9. Full code.

## 2018-01-11 NOTE — ED NOTES
CONSULT NOTE:  8:42 AM Lulu He MD spoke with Dr. Amaris Patel, Consult for Hospitalist.  Discussed available diagnostic tests and clinical findings. He is in agreement with care plans as outlined. Dr. Amaris Patel will admit patient.

## 2018-01-11 NOTE — ED TRIAGE NOTES
Triage; Patient arrives with family from home with c/o ongoing fever, chills, malaise and neck pain. Recently d/c with bacterial meningitis, believes year is 18 which family states is abnormal. Placed on droplet precautions upon triage.

## 2018-01-11 NOTE — H&P
[facility]  ACUTE CARE HISTORY AND PHYSICAL    Name:Silvia BAPTISTE  MR#: [id number]  : 1950  ACCOUNT #: [de-identified]   DATE OF SERVICE: 2018    CHIEF COMPLAINT:  Recurrent fever. HISTORY OF PRESENT ILLNESS:  The patient is a 65-year-old gentleman who was discharged from the hospital 2 days ago after he was hospitalized for acute bacterial meningitis. He was discharged on the  on IV antibiotics via a right PICC line. Since he left the hospital, he continued spiking a fever. The same night he was discharged, he had a temperature of 99.5. He had higher temperature yesterday of 101, and this morning he spiked a temperature of 100.7. They reached out to the infectious disease doctor yesterday, who advised them to return to the emergency room. Patient had new symptoms of diarrhea with 2-3 bowel movements since discharge. No recurrence of fever. Headache is improved. No photophobia, nausea or vomiting. Denies urinary symptoms. There is no redness, swelling and pain around the PICC line and he was compliant with his treatment. Upon arrival in the ER, he was found to have a temperature of 100.5 and subsequently 102.7, heart rate 107. His blood pressure was 150/80, respiration of 18, oxygen saturation of 99% on room air. His blood work showed a leukocytosis with 92% neutrophil. Serum chemistry shows sodium of 134, potassium of 3.4. Blood culture and CSF culture collected. CT of the head is pending. A portable chest x-ray showed patchy opacification, right lung base, medially, and an EKG showed sinus rhythm. Infectious Disease was consulted and asked for admission evaluation. The ED had administered normal saline at 30 mL/kg, Tylenol 1000 mg and antibiotics, erythromycin and his ceftriaxone were continued. Per review of records, the patient was discharged on ceftriaxone 2 grams b.i.d. to be completed on the .   During that admission, the patient had otomastoiditis and he underwent tympanomastoidectomy and ear tube right with the placement of the facial nerve electrodes. During that hospitalization, he developed what was believed to be methemoglobinemia and he was encephalopathic, toxic metabolic. PAST MEDICAL HISTORY:  As above. CURRENT MEDICATIONS:  Fioricet, irbesartan, Ciprodex 0.3%-0.1% otic suspension, Flonase nasal spray, Prilosec 20 mg, Mane-Synephrine nasal spray, Flomax 0.4 mg, Ceftriaxone 2 grams q.12h. ALLERGIES:  BENZOCAINE     SOCIAL HISTORY:  He is . Former smoker. Drinks alcohol socially. FAMILY MEDICAL HISTORY:  Negative. REVIEW OF SYSTEMS:  Positives include, fever, headache, diarrhea. Negatives include no photophobia, nausea, and vomiting. Headache is improving. No erythema, swelling or discharge around the PICC line. There is cough. No chest pain. No abdominal pain. The rest of the review of systems is negative. PHYSICAL EXAMINATION  GENERAL:  Patient is alert and oriented x4. He is not in any distress. VITAL SIGNS:  Most recent vital signs:  Temperature peaked at 100.7, now 98.6, blood pressure currently 132/58, respirations 20, oxygen saturation 94% on room air. HEENT:  Head is atraumatic. He has a healing surgical wound on the right post-auricular area. Right ear otoscopic examination was dried fluid. No sinus tenderness. LUNGS:  Clear air entry bilaterally. HEART:  S1, S2 well heard. No gallop or murmur. ABDOMEN:  Normoactive, soft, nontender, no hepatosplenomegaly appreciated. GENITOURINARY:  There is no costovertebral or suprapubic tenderness. EXTREMITIES:  He has PICC line on the right medial arm. Slight rash that looks like it is from the tape. No discharge, no tenderness, no swelling, no deformity. Edema on the lower extremities. NEUROLOGIC:  CNS:  Mental status:  He is alert and oriented x4. Cranial nerve examination II through XII are within normal limits.   Motor examination 5/5 on all upper and lower extremities. Light touch sensation is intact. Deep tendon 2/4. Meningeal signs: The patient is lying flat post-spinal tap and meningeal sign examination was deferred. CLINICAL DATA:  Discussed above. ASSESSMENT AND PLAN:  This is a 76-year-old gentleman recently diagnosed with meningitis and discharged 2 days ago on IV ceftriaxone through a right arm PICC line. He came back for fever and he was found to have leukocytosis and high grade fever. He underwent lumbar puncture and fluid is showing elevated protein of 92 and low blood sugar of 37. CAT scan of the head is pending. Sepsis. Patient presented with fever and leukocytosis while on IV ceftriaxone for meningitis via right PICC line. The exact etiology for this is not clear. There is a patchy consolidation on chest x-ray. Spinal tap and clinical findings are suggestive of meningitis, which he is being treated for. He has diarrhea with 2-3 bowel movements because of exposure to broad spectrum antibiotics throughout hospitalization. He is at risk for Clostridium difficile colitis. Patient to be admitted to medical floor. He has received 30 mL normal saline 4 mL/kg per sepsis protocol. Continue maintenance fluid. Antibiotics were expanded, Zithromax and vancomycin are added and  ceftriaxone is continued. Spinal tap, as above. Further studies pending. He had a bowel movement this morning. Nurse said it was soft and it was not sent for C. diff testing. I would like to make sure he does not have any new collection from the mastoid surgery. CT of the head is pending as patient has to lay flat after his spinal tap. The portable x-ray at this time reported patchy opacification in the right lung base medially and review of the x-ray on admission last time showed right suprahilar opacity. This could be the same process and could as well be atelectasis. He is appropriately covered with the above antibiotics. Chest x-ray is not expected to clear until 6-8 weeks. Mild hypokalemia. Mild hyponatremia due to dehydration, sepsis, high grade fever. Fluid rehydration, as above. Recent mastoidectomy. Outside wound looks clean. CT is pending. Continue his eardrops. Prophylaxis, gastrointestinal.  He is on Prilosec. Deep vein thrombosis, use SCDs for now post-lumbar puncture. We will put him on Lovenox maybe from tomorrow. CODE STATUS:  FULL. Case was discussed with infectious disease doctor, Dr. Suzanne Claire. I have discussed plan of care with patient and his wife who was present at the bedside. Advance care planning is discussed. HE IS FULL CODE. His wife, Neil Hanks, would be his surrogate decision maker if he is unable to speak for himself.       MD ROSITA Sandoval / [place]  D: 01/11/2018 11:56     T: 01/11/2018 14:36  JOB #: [id number]

## 2018-01-11 NOTE — PROGRESS NOTES
1230- pt not in room  1244- ID physician would like FLU tested, patient does not need to be on contact for bacterial meningitis since it was treated with ABX- pt is on Contact now for rule out Cdiff and rule out Flu.

## 2018-01-11 NOTE — PROGRESS NOTES
Admission Medication Reconciliation:    Information obtained from: RX query, discharge summary from 18, patient's wife    Significant PMH/Disease States:   Past Medical History:   Diagnosis Date    Bacterial meningitis     GERD (gastroesophageal reflux disease)     Hypertension        Chief Complaint for this Admission:  Fever    Allergies:  Hurricaine [benzocaine]    Prior to Admission Medications:   Prior to Admission Medications   Prescriptions Last Dose Informant Patient Reported? Taking?   0.9 % SODIUM CHLORIDE (NORMAL SALINE FLUSH INJECTION) 1/10/2018 at Unknown time  Yes Yes   Sig: 10 mL by IntraVENous route daily. before and after each antibiotic infusion   Omeprazole delayed release (PRILOSEC D/R) 20 mg tablet 1/10/2018 at Unknown time  Yes Yes   Sig: Take 20 mg by mouth daily. butalbital-acetaminophen-caffeine (FIORICET, ESGIC) -40 mg per tablet 1/10/2018 at Unknown time  No Yes   Sig: Take 1 Tab by mouth every four (4) hours as needed for Headache. Indications: TENSION-TYPE HEADACHE   cefTRIAXone (ROCEPHIN) 1 gram injection 1/10/2018 at 2000  Yes Yes   Si g by IntraVENous route every twelve (12) hours every twelve (12) hours. ciprofloxacin-dexamethasone (CIPRODEX) 0.3-0.1 % otic suspension 1/10/2018 at Unknown time  No Yes   Sig: Administer 4 Drops in right ear two (2) times a day for 10 days. Indications: Acute Otitis Media with Tympanostomy Tubes   fluticasone (FLONASE) 50 mcg/actuation nasal spray 1/10/2018 at Unknown time  Yes Yes   Si Sprays by Both Nostrils route daily. heparin, porcine, in ns (HEPARIN FLUSH) 10 unit/mL kit 1/10/2018 at Unknown time  Yes Yes   Sig: 3 mL by IntraVENous route daily. after final saline flush using s-a-s-h technique   irbesartan (AVAPRO) 150 mg tablet 1/10/2018 at Unknown time  Yes Yes   Sig: Take 150 mg by mouth nightly.    oxymetazoline (MARIA ELENA-SYNEPHRINE 12 H SPR, OXYM,) 0.05 % nasal spray   Yes yes   Si Sprays two (2) times daily as needed. tamsulosin (FLOMAX) 0.4 mg capsule 1/10/2018 at Unknown time  Yes Yes   Sig: Take 0.4 mg by mouth daily. Facility-Administered Medications: None         Comments/Recommendations:   1. Changed oxymetazoline BID to BID PRN  2. Added ceftriaxone - last received yesterday ~ 20:00.  Originally planned to continue until 1/22/18

## 2018-01-12 ENCOUNTER — APPOINTMENT (OUTPATIENT)
Dept: CT IMAGING | Age: 68
DRG: 871 | End: 2018-01-12
Attending: HOSPITALIST
Payer: MEDICARE

## 2018-01-12 ENCOUNTER — APPOINTMENT (OUTPATIENT)
Dept: GENERAL RADIOLOGY | Age: 68
DRG: 871 | End: 2018-01-12
Attending: HOSPITALIST
Payer: MEDICARE

## 2018-01-12 LAB
BASOPHILS # BLD: 0 K/UL (ref 0–0.1)
BASOPHILS NFR BLD: 0 % (ref 0–1)
BLASTS NFR BLD MANUAL: 0 %
DIFFERENTIAL METHOD BLD: ABNORMAL
EOSINOPHIL # BLD: 0 K/UL (ref 0–0.4)
EOSINOPHIL NFR BLD: 0 % (ref 0–7)
ERYTHROCYTE [DISTWIDTH] IN BLOOD BY AUTOMATED COUNT: 13.5 % (ref 11.5–14.5)
HCT VFR BLD AUTO: 32.2 % (ref 36.6–50.3)
HGB BLD-MCNC: 10.7 G/DL (ref 12.1–17)
LYMPHOCYTES # BLD: 0.4 K/UL (ref 0.8–3.5)
LYMPHOCYTES NFR BLD: 3 % (ref 12–49)
MANUAL DIFFERENTIAL PERFORMED BLD QL: ABNORMAL
MCH RBC QN AUTO: 29.8 PG (ref 26–34)
MCHC RBC AUTO-ENTMCNC: 33.2 G/DL (ref 30–36.5)
MCV RBC AUTO: 89.7 FL (ref 80–99)
METAMYELOCYTES NFR BLD MANUAL: 0 %
MONOCYTES # BLD: 0.9 K/UL (ref 0–1)
MONOCYTES NFR BLD: 7 % (ref 5–13)
MYELOCYTES NFR BLD MANUAL: 0 %
NEUTS BAND NFR BLD MANUAL: 2 % (ref 0–6)
NEUTS SEG # BLD: 11.7 K/UL (ref 1.8–8)
NEUTS SEG NFR BLD: 88 % (ref 32–75)
NRBC # BLD: 0 K/UL (ref 0–0.01)
NRBC BLD-RTO: 0 PER 100 WBC
OTHER CELLS NFR BLD MANUAL: 0 %
PLATELET # BLD AUTO: 304 K/UL (ref 150–400)
PROMYELOCYTES NFR BLD MANUAL: 0 %
RBC # BLD AUTO: 3.59 M/UL (ref 4.1–5.7)
RBC MORPH BLD: ABNORMAL
WBC # BLD AUTO: 13 K/UL (ref 4.1–11.1)
WBC MORPH BLD: ABNORMAL

## 2018-01-12 PROCEDURE — 36415 COLL VENOUS BLD VENIPUNCTURE: CPT | Performed by: HOSPITALIST

## 2018-01-12 PROCEDURE — 74011250636 HC RX REV CODE- 250/636: Performed by: HOSPITALIST

## 2018-01-12 PROCEDURE — 65270000029 HC RM PRIVATE

## 2018-01-12 PROCEDURE — 74011250636 HC RX REV CODE- 250/636: Performed by: EMERGENCY MEDICINE

## 2018-01-12 PROCEDURE — 71250 CT THORAX DX C-: CPT

## 2018-01-12 PROCEDURE — 74011250637 HC RX REV CODE- 250/637: Performed by: HOSPITALIST

## 2018-01-12 PROCEDURE — 71045 X-RAY EXAM CHEST 1 VIEW: CPT

## 2018-01-12 PROCEDURE — 74011000258 HC RX REV CODE- 258: Performed by: EMERGENCY MEDICINE

## 2018-01-12 PROCEDURE — 85027 COMPLETE CBC AUTOMATED: CPT | Performed by: HOSPITALIST

## 2018-01-12 PROCEDURE — 74011250637 HC RX REV CODE- 250/637: Performed by: INTERNAL MEDICINE

## 2018-01-12 RX ADMIN — CIPROFLOXACIN AND DEXAMETHASONE 4 DROP: 3; 1 SUSPENSION/ DROPS AURICULAR (OTIC) at 11:56

## 2018-01-12 RX ADMIN — TAMSULOSIN HYDROCHLORIDE 0.4 MG: 0.4 CAPSULE ORAL at 11:06

## 2018-01-12 RX ADMIN — OSELTAMIVIR PHOSPHATE 75 MG: 75 CAPSULE ORAL at 18:28

## 2018-01-12 RX ADMIN — Medication 1 CAPSULE: at 11:06

## 2018-01-12 RX ADMIN — SODIUM CHLORIDE AND POTASSIUM CHLORIDE: 9; 2.98 INJECTION, SOLUTION INTRAVENOUS at 01:55

## 2018-01-12 RX ADMIN — SODIUM CHLORIDE AND POTASSIUM CHLORIDE: 9; 2.98 INJECTION, SOLUTION INTRAVENOUS at 22:17

## 2018-01-12 RX ADMIN — FLUTICASONE PROPIONATE 2 SPRAY: 50 SPRAY, METERED NASAL at 11:55

## 2018-01-12 RX ADMIN — OSELTAMIVIR PHOSPHATE 75 MG: 75 CAPSULE ORAL at 11:54

## 2018-01-12 RX ADMIN — AZITHROMYCIN MONOHYDRATE 500 MG: 500 INJECTION, POWDER, LYOPHILIZED, FOR SOLUTION INTRAVENOUS at 11:19

## 2018-01-12 RX ADMIN — SODIUM CHLORIDE AND POTASSIUM CHLORIDE: 9; 2.98 INJECTION, SOLUTION INTRAVENOUS at 11:49

## 2018-01-12 RX ADMIN — CEFTRIAXONE 2 G: 2 INJECTION, POWDER, FOR SOLUTION INTRAMUSCULAR; INTRAVENOUS at 07:05

## 2018-01-12 RX ADMIN — CIPROFLOXACIN AND DEXAMETHASONE 4 DROP: 3; 1 SUSPENSION/ DROPS AURICULAR (OTIC) at 18:30

## 2018-01-12 RX ADMIN — ACETAMINOPHEN 650 MG: 650 SOLUTION ORAL at 02:30

## 2018-01-12 RX ADMIN — PANTOPRAZOLE SODIUM 40 MG: 40 TABLET, DELAYED RELEASE ORAL at 11:07

## 2018-01-12 RX ADMIN — OXYMETAZOLINE HYDROCHLORIDE 2 SPRAY: 5 SPRAY NASAL at 11:49

## 2018-01-12 RX ADMIN — ACETAMINOPHEN 650 MG: 650 SOLUTION ORAL at 11:06

## 2018-01-12 RX ADMIN — CEFTRIAXONE 2 G: 2 INJECTION, POWDER, FOR SOLUTION INTRAMUSCULAR; INTRAVENOUS at 19:05

## 2018-01-12 NOTE — PROGRESS NOTES
CM attempted to assess patient but patient currently off the floor- CM will follow-up at a later time. Christiano Watkins MSW.     14:45 p.m.- CM spoke with the patient's wife, Mrs. Zoraida Mota, briefly to introduce self and discuss role. Mrs. Artis Romero verbalized her concerns with the patient's recent discharge and fever spiking. RN completing care to patient, CM will return to fully assess once care is completed. Christiano Watkins, MSW. The patient was just recently discharged on 1/9 on home IV antibiotics- Ceftriaxone 2g Q12 utilizing [facility] and [facility]. The patient has had fevers since discharge and advised by Infectious Disease to return to ED. The patient lives at home with his wife, and wife and family present at bedside. The family verbalized satisfaction with current services in place- will await orders to resume [facility] and IV antibiotics. The family verbalized no changes in information since discharge on 1/9. CM will continue to follow. Christiano Watkins, MSW    Care Management Interventions  PCP Verified by CM:  Yes (Patient's PCP is Dr. Haim Horan)  Transition of Care Consult (CM Consult): Discharge Planning (Patient previously receiving [facility] for IV antibiotics prior to hospitalization)  MyChart Signup: No  Discharge Durable Medical Equipment: No  Physical Therapy Consult: No  Occupational Therapy Consult: No  Speech Therapy Consult: No  Current Support Network: Lives with Spouse (Patient lives with spouse, spouse and family present at bedside)  Confirm Follow Up Transport: Family (Patient has familial support and can provide transportation upon discharge)  Plan discussed with Pt/Family/Caregiver: Yes  Freedom of Choice Offered: Yes  [place] Resource Information Provided?: No  Discharge Location  Discharge Placement: Home with home health (Patient receiving [facility] prior to hospitalization)

## 2018-01-12 NOTE — PROGRESS NOTES
Raúl is a 75 y. o. with a history of vertigo/meniere's disease GERD and hypertension was in [facility] between 1/2/18-1/9/18. He was treated for pneumococcal bacteremia/meningits/rt mastoiditis/rt pneumonia  HE was sent home on IV ceftriaxone 2 grams Q12. After being discharged he started having fever of 101 and his wife talked to me yesterday and brought him back to the ED early this morning because of persistent fever  Wife says that his granddaughter is also sick with cough and fever and she had visited him in the hospital on Sunday 1/7/18 and hugged and kissed him     Medical history  Pt presented to [facility]  ER due to a change in mental status on 1/1/18.    workup at [facility] included CXR, CT head, lumbar puncture, blood cultures and urine culture. labs showed [place]BC of 23440, Lactic acid of 2.4,   LP demonstrated 2300 white cells with 94% neutrophils,  CSF glucose < 10.  no RBC an total protein >300. GS CSF had many WBC's but no organisms. Jessica Carrasquillo He was transferred to [facility] for higher level of care. As per his wife  been feeling unwell since before gigi for the past 10 days. 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 HE started with cough and then had headache - He went to [facility] over the weekend and was given zpak and prednisone. He started developing fever the next day and was c/o headache, nausea and vomiting HE was also dizzy. He was restless the whole night and on Monday as his temp was 104 and he was taken to [facility] on 1/1/18. CT head with out contrast showed opacity involving portion of rt mastoid and middle ear cavity questioning otomastoiditis  He got a dose of zosyn and then had LP which was as above and then given ceftriaxone/vanco and transferred to [facility]     In [facility] he had a CT mastoid which showed opacification rt mastoid- ENT saw him and he was taken for mastoidectomy on 1/3/18.  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 He recovered with oxygen and it was 0.9 in 4 hrs and was in ICU overnight. He was discharged on 1/9  But he is back with fever        Subjective  Doing better  No fever  Cough better         Objective:   VITALS:     Patient Vitals for the past 12 hrs:   Temp Pulse Resp BP SpO2   01/12/18 0257 100 °F (37.8 °C) 86 18 164/86 93 %     PHYSICAL EXAM:   Awake and alert,   Chest b/l air entry- few rhonchi   Hs s1s2  Abd soft  Rt mastoid surgical site - no erythema or discharge   CNS-non focal  Rt Midline      Pertinent Labs   WBC 15>13  HB 11.8>10.7  >304  Cr 0.94  Csf wbc 66 ( 57% N)  Protein 92  Glucose 37  Blood culture  Influenza A- H3N2 positive  Cdiff neg  Stool -occult blood positive         IMAGING RESULTS:      CT head-Chronic left parietal scalp thinning  with coarse dystrophic calcification.                     Impression/Recommendation  74 yo male with history of hypertension and GERD who is admitted with Fever after being discharged on 1/9     Fever- due to Influenza A H3N2- on tamiflu 75 mg BID for 5 days ( 1/16/18)-     Diarrhea-cdiff neg-occult blood positive     Bacterial meningitis due to pneumococcus being treated since 1/1/18- repeat LP much improved   Continue ceftriaxone 2 grams IV Q 12 -as planned until 1/22/18  ( see previous orders for home antibiotics from 1/8/18 progress note)       Right  otomastoiditis-   S/p Right tympanomastoidectomy.   Myringotomy tubes        Pneumococcus bacteremia with rt pneumonia and meningitis-[place] triad ruled out by DAVI no endocarditis    HIV test-negative  Immuno compromised due to alcohol  Will need pneumococcal vaccines 13 and 23 after completion of antibiotic      POST DAVI hypoxemia- asymptomatic- methemoglobinemia due to topical anesthesia . Will need  IV ceftriaxone ( 1/22/18)      Rt hilar opacity- - ?  Pneumonia- persist from 1/2/  ? CT chest      HTN-          Discussed the management with patient,   Tried to call his wife-but her phone is not working     will follow him peripherally over the weekend- call her if needed thru the

## 2018-01-12 NOTE — ROUTINE PROCESS
Bedside shift change report given to Genet Oropeza (oncoming nurse) by Lynn Maher (offgoing nurse). Report included the following information SBAR.

## 2018-01-12 NOTE — PROGRESS NOTES
TRANSFER - IN REPORT:    Verbal report received from Monica(name) on [address]  being received from [employer]) for routine post - op      Report consisted of patients Situation, Background, Assessment and   Recommendations(SBAR). Information from the following report(s) SBAR, Kardex, Procedure Summary, Intake/Output, MAR and Recent Results was reviewed with the receiving nurse. Opportunity for questions and clarification was provided. Assessment completed upon patients arrival to unit and care assumed.
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
## 2018-01-12 NOTE — PROGRESS NOTES
Bedside and Verbal shift change report given to Dory Bueno [facility] (oncoming nurse) by Kyle Garland RN (offgoing nurse). Report included the following information SBAR, Kardex and MAR.

## 2018-01-12 NOTE — PROGRESS NOTES
Bedside and Verbal shift change report given to [employer] (oncoming nurse) by Ulisses Granger (offgoing nurse). Report included the following information SBAR, Kardex, Procedure Summary, Intake/Output, MAR and Recent Results.

## 2018-01-12 NOTE — PROGRESS NOTES
Primary Nurse Dariela Rollins RN and Ioana Huffman RN performed a dual skin assessment on this patient No impairment noted  Peter score is 18

## 2018-01-12 NOTE — PROGRESS NOTES
Hospitalist Progress Note  Ayleen Hawthorne MD  Answering service: [phone] from in house phone  Cell:       Date of Service:  2018  NAME:  Marlin Lane  :  1950  MRN:  [id number]      Admission Summary:    The patient is a 30-year-old gentleman who was discharged from the hospital 2 days ago after he was hospitalized for acute bacterial meningitis. He was discharged on the  on IV antibiotics via a right PICC line. Since he left the hospital, he continued spiking a fever. The same night he was discharged, he had a temperature of 99.5. He had higher temperature yesterday of 101, and this morning he spiked a temperature of 100.7. They reached out to the infectious disease doctor yesterday, who advised them to return to the emergency room. Patient had new symptoms of diarrhea with 2-3 bowel movements since discharge. No recurrence of fever. Headache is improved. No photophobia, nausea or vomiting. Denies urinary symptoms. There is no redness, swelling and pain around the PICC line and he was compliant with his treatment. Interval history / Subjective:     Complaints of Diarrhea and dry cough. He denies any abdominal pain or nausea. Assessment & Plan:      Influenza- On Tamiflu and droplet precautions.   Diarrhea-cdiff neg-occult blood positive probably antibiotic associated Diarrhea add probiotics. I will order stool WBC and culture as well.   Bacterial meningitis due to pneumococcus being treated since 18- repeat LP much improved on Rocephin continue till 18. Previous LP showed LP demonstrated 2300 white cells with 94% neutrophils,  CSF glucose < 10.  no RBC an total protein >300. GS CSF had many WBC's but no organisms. Repeat LP yesterday showed 72 WBC protein 92. Right  otomastoiditis- S/p Right tympanomastoidectomy.   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   Rt  Lung base opacity- - ? Pneumonia will order CT chest      HTN-   Code status: full  DVT prophylaxis:     Care Plan discussed with: Patient/Family  Disposition: Home w/Family and TBD     Hospital Problems  Never Reviewed          Codes Class Noted POA    * (Principal)Sepsis ([facility] [address] ICD-10-CM: A41.9  ICD-9-CM: 038.9, 995.91  1/11/2018 Unknown        Meningitis ICD-10-CM: G03.9  ICD-9-CM: 322.9  1/11/2018 Unknown                Review of Systems:   A comprehensive review of systems was negative except for that written in the HPI. Vital Signs:    Last 24hrs VS reviewed since prior progress note. Most recent are:  Visit Vitals    /86 (BP 1 Location: Left arm, BP Patient Position: At rest)    Pulse 86    Temp 100 °F (37.8 °C)    Resp 18    Wt 88.9 kg (196 lb)    SpO2 93%    BMI 29.8 kg/m2         Intake/Output Summary (Last 24 hours) at 01/12/18 0850  Last data filed at 01/12/18 0400   Gross per 24 hour   Intake                0 ml   Output              350 ml   Net             -350 ml        Physical Examination:             Constitutional:  No acute distress, cooperative, pleasant    ENT:  Oral mucous moist, oropharynx benign. Neck supple,    Resp:  CTA bilaterally. No wheezing/rhonchi/rales. No accessory muscle use   CV:  Regular rhythm, normal rate, no murmurs, gallops, rubs    GI:  Soft, non distended, non tender. normoactive bowel sounds, no hepatosplenomegaly     Musculoskeletal:  No edema, warm, 2+ pulses throughout    Neurologic:  Moves all extremities. AAOx3, CN II-XII reviewed     Psych:  Good insight, Not anxious nor agitated.        Data Review:    Review and/or order of clinical lab test      Labs:     Recent Labs      01/11/18   0642   WBC  15.0* HGB  11.8*   HCT  36.0*   PLT  386     Recent Labs      01/11/18   0642   NA  134*   K  3.4*   CL  100   CO2  24   BUN  10   CREA  0.94   GLU  125*   CA  8.0*     Recent Labs      01/11/18   0642   SGOT  24   ALT  44   AP  92   TBILI  0.8   TP  6.4   ALB  2.6*   GLOB  3.8     No results for input(s): INR, PTP, APTT in the last 72 hours. No lab exists for component: INREXT   No results for input(s): FE, TIBC, PSAT, FERR in the last 72 hours. No results found for: FOL, RBCF   No results for input(s): PH, PCO2, PO2 in the last 72 hours. No results for input(s): CPK, CKNDX, TROIQ in the last 72 hours.     No lab exists for component: CPKMB  No results found for: CHOL, CHOLX, CHLST, CHOLV, HDL, LDL, LDLC, DLDLP, TGLX, TRIGL, TRIGP, CHHD, CHHDX  Lab Results   Component Value Date/Time    Glucose (POC) 87 01/11/2018 05:27 PM    Glucose (POC) 74 01/11/2018 04:58 PM     Lab Results   Component Value Date/Time    Color YELLOW/STRAW 01/11/2018 10:31 PM    Appearance CLEAR 01/11/2018 10:31 PM    Specific gravity 1.007 01/11/2018 10:31 PM    pH (UA) 6.0 01/11/2018 10:31 PM    Protein NEGATIVE  01/11/2018 10:31 PM    Glucose NEGATIVE  01/11/2018 10:31 PM    Ketone NEGATIVE  01/11/2018 10:31 PM    Bilirubin NEGATIVE  01/11/2018 10:31 PM    Urobilinogen 0.2 01/11/2018 10:31 PM    Nitrites NEGATIVE  01/11/2018 10:31 PM    Leukocyte Esterase NEGATIVE  01/11/2018 10:31 PM    Epithelial cells FEW 01/11/2018 10:31 PM    Bacteria NEGATIVE  01/11/2018 10:31 PM    WBC 0-4 01/11/2018 10:31 PM    RBC 0-5 01/11/2018 10:31 PM         Medications Reviewed:     Current Facility-Administered Medications   Medication Dose Route Frequency    sodium chloride (NS) flush 5-10 mL  5-10 mL IntraVENous PRN    cefTRIAXone (ROCEPHIN) 2 g in 0.9% sodium chloride (MBP/ADV) 50 mL  2 g IntraVENous Q12H    sodium chloride (NS) flush 5-10 mL  5-10 mL IntraVENous Q8H    sodium chloride (NS) flush 5-10 mL  5-10 mL IntraVENous PRN    ondansetron (ZOFRAN ODT) tablet 4 mg  4 mg Oral Q4H PRN    acetaminophen (TYLENOL) solution 650 mg  650 mg Oral Q6H PRN    oxyCODONE IR (ROXICODONE) tablet 5 mg  5 mg Oral Q4H PRN    pantoprazole (PROTONIX) tablet 40 mg  40 mg Oral DAILY    ciprofloxacin-dexamethasone (CIPRODEX) 0.3-0.1 % otic suspension 4 Drop  4 Drop Right Ear BID    fluticasone (FLONASE) 50 mcg/actuation nasal spray 2 Spray  2 Spray Both Nostrils DAILY    tamsulosin (FLOMAX) capsule 0.4 mg  0.4 mg Oral DAILY    0.9% sodium chloride with KCl 40 mEq/L infusion   IntraVENous CONTINUOUS    azithromycin (ZITHROMAX) 500 mg in 0.9% sodium chloride (MBP/ADV) 250 mL  500 mg IntraVENous Q24H    lactobac ac& pc-s.therm-b.anim (JOSE L Q/RISAQUAD)  1 Cap Oral DAILY    oxymetazoline (AFRIN) 0.05 % nasal spray 2 Spray  2 Spray Both Nostrils BID PRN    oseltamivir (TAMIFLU) capsule 75 mg  75 mg Oral BID     ______________________________________________________________________  EXPECTED LENGTH OF STAY: - - -  ACTUAL LENGTH OF STAY:          1                 Kristina Brunner MD

## 2018-01-13 LAB
ANION GAP SERPL CALC-SCNC: 10 MMOL/L (ref 5–15)
BUN SERPL-MCNC: 6 MG/DL (ref 6–20)
BUN/CREAT SERPL: 8 (ref 12–20)
CALCIUM SERPL-MCNC: 7.6 MG/DL (ref 8.5–10.1)
CHLORIDE SERPL-SCNC: 104 MMOL/L (ref 97–108)
CO2 SERPL-SCNC: 21 MMOL/L (ref 21–32)
CREAT SERPL-MCNC: 0.75 MG/DL (ref 0.7–1.3)
ERYTHROCYTE [DISTWIDTH] IN BLOOD BY AUTOMATED COUNT: 13.5 % (ref 11.5–14.5)
GLUCOSE SERPL-MCNC: 86 MG/DL (ref 65–100)
HCT VFR BLD AUTO: 30.5 % (ref 36.6–50.3)
HGB BLD-MCNC: 10 G/DL (ref 12.1–17)
MCH RBC QN AUTO: 29.1 PG (ref 26–34)
MCHC RBC AUTO-ENTMCNC: 32.8 G/DL (ref 30–36.5)
MCV RBC AUTO: 88.7 FL (ref 80–99)
PLATELET # BLD AUTO: 275 K/UL (ref 150–400)
POTASSIUM SERPL-SCNC: 4 MMOL/L (ref 3.5–5.1)
RBC # BLD AUTO: 3.44 M/UL (ref 4.1–5.7)
SODIUM SERPL-SCNC: 135 MMOL/L (ref 136–145)
WBC # BLD AUTO: 10.8 K/UL (ref 4.1–11.1)

## 2018-01-13 PROCEDURE — 65270000029 HC RM PRIVATE

## 2018-01-13 PROCEDURE — 80048 BASIC METABOLIC PNL TOTAL CA: CPT | Performed by: HOSPITALIST

## 2018-01-13 PROCEDURE — 36415 COLL VENOUS BLD VENIPUNCTURE: CPT | Performed by: HOSPITALIST

## 2018-01-13 PROCEDURE — 74011000258 HC RX REV CODE- 258: Performed by: EMERGENCY MEDICINE

## 2018-01-13 PROCEDURE — 74011250636 HC RX REV CODE- 250/636: Performed by: HOSPITALIST

## 2018-01-13 PROCEDURE — 74011250637 HC RX REV CODE- 250/637: Performed by: INTERNAL MEDICINE

## 2018-01-13 PROCEDURE — 74011250637 HC RX REV CODE- 250/637: Performed by: HOSPITALIST

## 2018-01-13 PROCEDURE — 85027 COMPLETE CBC AUTOMATED: CPT | Performed by: HOSPITALIST

## 2018-01-13 PROCEDURE — 74011250636 HC RX REV CODE- 250/636: Performed by: EMERGENCY MEDICINE

## 2018-01-13 RX ADMIN — Medication 1 CAPSULE: at 10:13

## 2018-01-13 RX ADMIN — SODIUM CHLORIDE AND POTASSIUM CHLORIDE: 9; 2.98 INJECTION, SOLUTION INTRAVENOUS at 06:12

## 2018-01-13 RX ADMIN — AZITHROMYCIN MONOHYDRATE 500 MG: 500 INJECTION, POWDER, LYOPHILIZED, FOR SOLUTION INTRAVENOUS at 10:13

## 2018-01-13 RX ADMIN — OSELTAMIVIR PHOSPHATE 75 MG: 75 CAPSULE ORAL at 10:29

## 2018-01-13 RX ADMIN — OSELTAMIVIR PHOSPHATE 75 MG: 75 CAPSULE ORAL at 17:53

## 2018-01-13 RX ADMIN — CEFTRIAXONE 2 G: 2 INJECTION, POWDER, FOR SOLUTION INTRAMUSCULAR; INTRAVENOUS at 21:58

## 2018-01-13 RX ADMIN — Medication 10 ML: at 21:58

## 2018-01-13 RX ADMIN — SODIUM CHLORIDE AND POTASSIUM CHLORIDE: 9; 2.98 INJECTION, SOLUTION INTRAVENOUS at 17:53

## 2018-01-13 RX ADMIN — Medication 10 ML: at 06:14

## 2018-01-13 RX ADMIN — CEFTRIAXONE 2 G: 2 INJECTION, POWDER, FOR SOLUTION INTRAMUSCULAR; INTRAVENOUS at 09:40

## 2018-01-13 RX ADMIN — CIPROFLOXACIN AND DEXAMETHASONE 4 DROP: 3; 1 SUSPENSION/ DROPS AURICULAR (OTIC) at 17:53

## 2018-01-13 RX ADMIN — PANTOPRAZOLE SODIUM 40 MG: 40 TABLET, DELAYED RELEASE ORAL at 10:13

## 2018-01-13 RX ADMIN — TAMSULOSIN HYDROCHLORIDE 0.4 MG: 0.4 CAPSULE ORAL at 10:13

## 2018-01-13 RX ADMIN — FLUTICASONE PROPIONATE 2 SPRAY: 50 SPRAY, METERED NASAL at 10:14

## 2018-01-13 RX ADMIN — CIPROFLOXACIN AND DEXAMETHASONE 4 DROP: 3; 1 SUSPENSION/ DROPS AURICULAR (OTIC) at 10:14

## 2018-01-13 NOTE — CDMP QUERY
Hi Dr. Humberto Gutierrez,     Sepsis has noted in the H/P; however, it is not noted in subsequent documentation. Kerry Snowden clarify if this condition was:    Treated & resolved  Ongoing/Improving  Ruled Out  Other Explanation of the Clinical Findings  Unable to Determine (no explanation for clinical findings)    The medical record reflects the following clinical findings, risk factors and treatment:     Risk Factors:  71 y/o pt  Clinical Indicators:  leukocytosis, fever, tachycardia   Treatments: IV abxs, serial lab monitoring    Please clarify and document your clinical opinion in the progress notes and discharge summary including the definitive and/or presumptive diagnosis, (suspected or probable), related to the above clinical findings. Please include clinical findings supporting your diagnosis.     Thank you,  Anamika Way, [address] [facility]

## 2018-01-13 NOTE — PROGRESS NOTES
Problem: Falls - Risk of  Goal: *Absence of Falls  Document Lisbeth Fall Risk and appropriate interventions in the flowsheet.    Outcome: Progressing Towards Goal  Fall Risk Interventions:  Mobility Interventions: Assess mobility with egress test         Medication Interventions: Patient to call before getting OOB    Elimination Interventions: Call light in reach, Patient to call for help with toileting needs

## 2018-01-13 NOTE — PROGRESS NOTES
Vascular Access Team : Called to assess a Midline that was placed on a prior admission (1/7/18 10 cm). Dressing is dry and intact. Insertion site under Biopatch and is not visible however, from what I can observe, the site is benign (without bruising, redness, or tenderness). While there is no blood return, the midline does flush easily. Duration of Midline use and duration of admission TBD. Patient care nurse, Esasimba Matters, RN, aware.

## 2018-01-13 NOTE — PROGRESS NOTES
Bedside and Verbal shift change report given to [facility], RN (oncoming nurse) by Hilaria Jain RN (offgoing nurse). Report included the following information SBAR, Kardex, ED Summary, Procedure Summary, Intake/Output, MAR and Recent Results.

## 2018-01-13 NOTE — PROGRESS NOTES
Problem: Falls - Risk of  Goal: *Absence of Falls  Document Lisbeth Fall Risk and appropriate interventions in the flowsheet.    Outcome: Progressing Towards Goal  Fall Risk Interventions:  Mobility Interventions: Patient to call before getting OOB, Utilize walker, cane, or other assitive device         Medication Interventions: Patient to call before getting OOB, Teach patient to arise slowly    Elimination Interventions: Elevated toilet seat, Call light in reach, Urinal in reach, Patient to call for help with toileting needs

## 2018-01-13 NOTE — PROGRESS NOTES
Hospitalist Progress Note  Tara Tinsley MD  Answering service: [phone] from in house phone  Cell:       Date of Service:  2018  NAME:  Jimmy Ruiz  :  1950  MRN:  [id number]      Admission Summary:    The patient is a 69-year-old gentleman who was discharged from the hospital 2 days ago after he was hospitalized for acute bacterial meningitis. He was discharged on the  on IV antibiotics via a right PICC line. Since he left the hospital, he continued spiking a fever. The same night he was discharged, he had a temperature of 99.5. He had higher temperature yesterday of 101, and this morning he spiked a temperature of 100.7. They reached out to the infectious disease doctor yesterday, who advised them to return to the emergency room. Patient had new symptoms of diarrhea with 2-3 bowel movements since discharge. No recurrence of fever. Headache is improved. No photophobia, nausea or vomiting. Denies urinary symptoms. There is no redness, swelling and pain around the PICC line and he was compliant with his treatment. Interval history / Subjective:     Complaints of Diarrhea and dry cough. He denies any abdominal pain or nausea. Still has mild diarrhea     Assessment & Plan:      Influenza- On Tamiflu and droplet precautions.   Diarrhea-cdiff neg-occult blood positive probably antibiotic associated Diarrhea add probiotics. I will order stool WBC and culture as well.   Bacterial meningitis due to pneumococcus being treated since 18- repeat LP much improved on Rocephin continue till 18. Previous LP showed LP demonstrated 2300 white cells with 94% neutrophils,  CSF glucose < 10.  no RBC an total protein >300. GS CSF had many WBC's but no organisms. Repeat LP yesterday showed 72 WBC protein 92. Right  otomastoiditis- S/p Right tympanomastoidectomy.   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   Rt  Lung base opacity- -  Pneumonia  Seen on CT :There is persistent right lower lobe pneumonia but this has decreased  significantly when compared with the CT scan of 1-2-18. On Rocephin and Zithromax.      HTN-   Code status: full  DVT prophylaxis:     Care Plan discussed with: Patient/Family  Disposition: Home w/Family and TBD     Hospital Problems  Never Reviewed          Codes Class Noted POA    * (Principal)Sepsis ([facility] [address] ICD-10-CM: A41.9  ICD-9-CM: 038.9, 995.91  1/11/2018 Unknown        Meningitis ICD-10-CM: G03.9  ICD-9-CM: 322.9  1/11/2018 Unknown                Review of Systems:   A comprehensive review of systems was negative except for that written in the HPI. Vital Signs:    Last 24hrs VS reviewed since prior progress note. Most recent are:  Visit Vitals    /86 (BP 1 Location: Left arm, BP Patient Position: At rest)    Pulse 76    Temp 98.4 °F (36.9 °C)    Resp 16    Wt 88.9 kg (196 lb)    SpO2 94%    BMI 29.8 kg/m2         Intake/Output Summary (Last 24 hours) at 01/13/18 1059  Last data filed at 01/13/18 0840   Gross per 24 hour   Intake             1215 ml   Output             1175 ml   Net               40 ml        Physical Examination:             Constitutional:  No acute distress, cooperative, pleasant    ENT:  Oral mucous moist, oropharynx benign. Neck supple,    Resp:  CTA bilaterally. No wheezing/rhonchi/rales. No accessory muscle use   CV:  Regular rhythm, normal rate, no murmurs, gallops, rubs    GI:  Soft, non distended, non tender. normoactive bowel sounds, no hepatosplenomegaly     Musculoskeletal:  No edema, warm, 2+ pulses throughout    Neurologic:  Moves all extremities.   AAOx3, CN II-XII reviewed     Psych: Good insight, Not anxious nor agitated. Data Review:    Review and/or order of clinical lab test      Labs:     Recent Labs      01/13/18   0302  01/12/18   0921   WBC  10.8  13.0*   HGB  10.0*  10.7*   HCT  30.5*  32.2*   PLT  275  304     Recent Labs      01/13/18   0302  01/11/18   0642   NA  135*  134*   K  4.0  3.4*   CL  104  100   CO2  21  24   BUN  6  10   CREA  0.75  0.94   GLU  86  125*   CA  7.6*  8.0*     Recent Labs      01/11/18   0642   SGOT  24   ALT  44   AP  92   TBILI  0.8   TP  6.4   ALB  2.6*   GLOB  3.8     No results for input(s): INR, PTP, APTT in the last 72 hours. No lab exists for component: INREXT, INREXT   No results for input(s): FE, TIBC, PSAT, FERR in the last 72 hours. No results found for: FOL, RBCF   No results for input(s): PH, PCO2, PO2 in the last 72 hours. No results for input(s): CPK, CKNDX, TROIQ in the last 72 hours.     No lab exists for component: CPKMB  No results found for: CHOL, CHOLX, CHLST, CHOLV, HDL, LDL, LDLC, DLDLP, TGLX, TRIGL, TRIGP, CHHD, CHHDX  Lab Results   Component Value Date/Time    Glucose (POC) 87 01/11/2018 05:27 PM    Glucose (POC) 74 01/11/2018 04:58 PM     Lab Results   Component Value Date/Time    Color YELLOW/STRAW 01/11/2018 10:31 PM    Appearance CLEAR 01/11/2018 10:31 PM    Specific gravity 1.007 01/11/2018 10:31 PM    pH (UA) 6.0 01/11/2018 10:31 PM    Protein NEGATIVE  01/11/2018 10:31 PM    Glucose NEGATIVE  01/11/2018 10:31 PM    Ketone NEGATIVE  01/11/2018 10:31 PM    Bilirubin NEGATIVE  01/11/2018 10:31 PM    Urobilinogen 0.2 01/11/2018 10:31 PM    Nitrites NEGATIVE  01/11/2018 10:31 PM    Leukocyte Esterase NEGATIVE  01/11/2018 10:31 PM    Epithelial cells FEW 01/11/2018 10:31 PM    Bacteria NEGATIVE  01/11/2018 10:31 PM    WBC 0-4 01/11/2018 10:31 PM    RBC 0-5 01/11/2018 10:31 PM         Medications Reviewed:     Current Facility-Administered Medications   Medication Dose Route Frequency    sodium chloride (NS) flush 5-10 mL 5-10 mL IntraVENous PRN    cefTRIAXone (ROCEPHIN) 2 g in 0.9% sodium chloride (MBP/ADV) 50 mL  2 g IntraVENous Q12H    sodium chloride (NS) flush 5-10 mL  5-10 mL IntraVENous Q8H    sodium chloride (NS) flush 5-10 mL  5-10 mL IntraVENous PRN    ondansetron (ZOFRAN ODT) tablet 4 mg  4 mg Oral Q4H PRN    acetaminophen (TYLENOL) solution 650 mg  650 mg Oral Q6H PRN    oxyCODONE IR (ROXICODONE) tablet 5 mg  5 mg Oral Q4H PRN    pantoprazole (PROTONIX) tablet 40 mg  40 mg Oral DAILY    ciprofloxacin-dexamethasone (CIPRODEX) 0.3-0.1 % otic suspension 4 Drop  4 Drop Right Ear BID    fluticasone (FLONASE) 50 mcg/actuation nasal spray 2 Spray  2 Spray Both Nostrils DAILY    tamsulosin (FLOMAX) capsule 0.4 mg  0.4 mg Oral DAILY    0.9% sodium chloride with KCl 40 mEq/L infusion   IntraVENous CONTINUOUS    azithromycin (ZITHROMAX) 500 mg in 0.9% sodium chloride (MBP/ADV) 250 mL  500 mg IntraVENous Q24H    lactobac ac& pc-s.therm-b.anim (JOSE L Q/RISAQUAD)  1 Cap Oral DAILY    oxymetazoline (AFRIN) 0.05 % nasal spray 2 Spray  2 Spray Both Nostrils BID PRN    oseltamivir (TAMIFLU) capsule 75 mg  75 mg Oral BID     ______________________________________________________________________  EXPECTED LENGTH OF STAY: 4d 21h  ACTUAL LENGTH OF STAY:          2                 Nydia Carcamo MD

## 2018-01-14 LAB — WBC #/AREA STL HPF: NORMAL /HPF (ref 0–4)

## 2018-01-14 PROCEDURE — 65270000029 HC RM PRIVATE

## 2018-01-14 PROCEDURE — 74011250637 HC RX REV CODE- 250/637: Performed by: HOSPITALIST

## 2018-01-14 PROCEDURE — 74011250637 HC RX REV CODE- 250/637: Performed by: INTERNAL MEDICINE

## 2018-01-14 PROCEDURE — 74011250636 HC RX REV CODE- 250/636: Performed by: HOSPITALIST

## 2018-01-14 PROCEDURE — 77030020847 HC STATLOK BARD -A

## 2018-01-14 PROCEDURE — 74011000258 HC RX REV CODE- 258: Performed by: EMERGENCY MEDICINE

## 2018-01-14 PROCEDURE — 74011250636 HC RX REV CODE- 250/636: Performed by: EMERGENCY MEDICINE

## 2018-01-14 PROCEDURE — 87045 FECES CULTURE AEROBIC BACT: CPT | Performed by: HOSPITALIST

## 2018-01-14 PROCEDURE — 89055 LEUKOCYTE ASSESSMENT FECAL: CPT | Performed by: HOSPITALIST

## 2018-01-14 RX ADMIN — TAMSULOSIN HYDROCHLORIDE 0.4 MG: 0.4 CAPSULE ORAL at 09:33

## 2018-01-14 RX ADMIN — CEFTRIAXONE 2 G: 2 INJECTION, POWDER, FOR SOLUTION INTRAMUSCULAR; INTRAVENOUS at 21:40

## 2018-01-14 RX ADMIN — PANTOPRAZOLE SODIUM 40 MG: 40 TABLET, DELAYED RELEASE ORAL at 09:33

## 2018-01-14 RX ADMIN — CIPROFLOXACIN AND DEXAMETHASONE 4 DROP: 3; 1 SUSPENSION/ DROPS AURICULAR (OTIC) at 17:37

## 2018-01-14 RX ADMIN — CIPROFLOXACIN AND DEXAMETHASONE 4 DROP: 3; 1 SUSPENSION/ DROPS AURICULAR (OTIC) at 09:33

## 2018-01-14 RX ADMIN — FLUTICASONE PROPIONATE 2 SPRAY: 50 SPRAY, METERED NASAL at 09:33

## 2018-01-14 RX ADMIN — AZITHROMYCIN MONOHYDRATE 500 MG: 500 INJECTION, POWDER, LYOPHILIZED, FOR SOLUTION INTRAVENOUS at 09:32

## 2018-01-14 RX ADMIN — Medication 10 ML: at 21:40

## 2018-01-14 RX ADMIN — SODIUM CHLORIDE AND POTASSIUM CHLORIDE: 9; 2.98 INJECTION, SOLUTION INTRAVENOUS at 04:51

## 2018-01-14 RX ADMIN — ACETAMINOPHEN 650 MG: 650 SOLUTION ORAL at 21:50

## 2018-01-14 RX ADMIN — Medication 1 CAPSULE: at 09:33

## 2018-01-14 RX ADMIN — Medication 10 ML: at 06:00

## 2018-01-14 RX ADMIN — CEFTRIAXONE 2 G: 2 INJECTION, POWDER, FOR SOLUTION INTRAMUSCULAR; INTRAVENOUS at 09:32

## 2018-01-14 RX ADMIN — OSELTAMIVIR PHOSPHATE 75 MG: 75 CAPSULE ORAL at 17:37

## 2018-01-14 RX ADMIN — OSELTAMIVIR PHOSPHATE 75 MG: 75 CAPSULE ORAL at 09:33

## 2018-01-14 NOTE — PROGRESS NOTES
Bedside and Verbal shift change report given to Titus Keller RN (oncoming nurse) by Nisreen Rios RN (offgoing nurse). Report included the following information SBAR, Kardex, Intake/Output, MAR and Recent Results.

## 2018-01-14 NOTE — PROGRESS NOTES
Problem: Falls - Risk of  Goal: *Absence of Falls  Document Lisbeth Fall Risk and appropriate interventions in the flowsheet.    Outcome: Progressing Towards Goal  Fall Risk Interventions:  Mobility Interventions: Patient to call before getting OOB, Utilize walker, cane, or other assitive device         Medication Interventions: Patient to call before getting OOB, Teach patient to arise slowly    Elimination Interventions: Call light in reach, Elevated toilet seat, Urinal in reach, Toilet paper/wipes in reach, Patient to call for help with toileting needs

## 2018-01-14 NOTE — PROGRESS NOTES
Hospitalist Progress Note  Ayleen Hawthorne MD  Answering service: [phone] from in house phone  Cell:       Date of Service:  2018  NAME:  Marlin Lane  :  1950  MRN:  [id number]      Admission Summary:    The patient is a 39-year-old gentleman who was discharged from the hospital 2 days ago after he was hospitalized for acute bacterial meningitis. He was discharged on the  on IV antibiotics via a right PICC line. Since he left the hospital, he continued spiking a fever. The same night he was discharged, he had a temperature of 99.5. He had higher temperature yesterday of 101, and this morning he spiked a temperature of 100.7. They reached out to the infectious disease doctor yesterday, who advised them to return to the emergency room. Patient had new symptoms of diarrhea with 2-3 bowel movements since discharge. No recurrence of fever. Headache is improved. No photophobia, nausea or vomiting. Denies urinary symptoms. There is no redness, swelling and pain around the PICC line and he was compliant with his treatment. Interval history / Subjective:     Complaints of Diarrhea and dry cough. He denies any abdominal pain or nausea. Bowel movement is better had 1-2 loose BM yesterday     Assessment & Plan:      Influenza- On Tamiflu and droplet precautions.   Diarrhea-cdiff neg-occult blood positive probably antibiotic associated Diarrhea add probiotics. stool WBC and culture pending.   Bacterial meningitis due to pneumococcus being treated since 18- repeat LP much improved on Rocephin continue till 18. Previous LP showed LP demonstrated 2300 white cells with 94% neutrophils,  CSF glucose < 10.  no RBC an total protein >300. GS CSF had many WBC's but no organisms. Repeat LP on admission showed 72 WBC protein 92. Right  otomastoiditis- S/p Right tympanomastoidectomy.   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   Rt  Lung base opacity- -  Pneumonia  Seen on CT :There is persistent right lower lobe pneumonia but this has decreased  significantly when compared with the CT scan of 1-2-18. On Rocephin and Zithromax. Advance diet as tolerated.      HTN-   Code status: full  DVT prophylaxis:     Care Plan discussed with: Patient/Family  Disposition: Home w/Family and TBD     Hospital Problems  Never Reviewed          Codes Class Noted POA    * (Principal)Sepsis ([facility] [address] ICD-10-CM: A41.9  ICD-9-CM: 038.9, 995.91  1/11/2018 Unknown        Meningitis ICD-10-CM: G03.9  ICD-9-CM: 322.9  1/11/2018 Unknown                Review of Systems:   A comprehensive review of systems was negative except for that written in the HPI. Vital Signs:    Last 24hrs VS reviewed since prior progress note. Most recent are:  Visit Vitals    /74 (BP 1 Location: Left arm, BP Patient Position: At rest)    Pulse 77    Temp 98.5 °F (36.9 °C)    Resp 16    Wt 88.9 kg (196 lb)    SpO2 95%    BMI 29.8 kg/m2         Intake/Output Summary (Last 24 hours) at 01/14/18 0933  Last data filed at 01/14/18 0758   Gross per 24 hour   Intake              720 ml   Output             2450 ml   Net            -1730 ml        Physical Examination:             Constitutional:  No acute distress, cooperative, pleasant    ENT:  Oral mucous moist, oropharynx benign. Neck supple,    Resp:  CTA bilaterally. No wheezing/rhonchi/rales. No accessory muscle use   CV:  Regular rhythm, normal rate, no murmurs, gallops, rubs    GI:  Soft, non distended, non tender.  normoactive bowel sounds, no hepatosplenomegaly     Musculoskeletal:  No edema, warm, 2+ pulses throughout    Neurologic:  Moves all extremities. AAOx3, CN II-XII reviewed     Psych:  Good insight, Not anxious nor agitated. Data Review:    Review and/or order of clinical lab test      Labs:     Recent Labs      01/13/18   0302  01/12/18   0921   WBC  10.8  13.0*   HGB  10.0*  10.7*   HCT  30.5*  32.2*   PLT  275  304     Recent Labs      01/13/18   0302   NA  135*   K  4.0   CL  104   CO2  21   BUN  6   CREA  0.75   GLU  86   CA  7.6*     No results for input(s): SGOT, GPT, ALT, AP, TBIL, TBILI, TP, ALB, GLOB, GGT, AML, LPSE in the last 72 hours. No lab exists for component: AMYP, HLPSE  No results for input(s): INR, PTP, APTT in the last 72 hours. No lab exists for component: INREXT, INREXT   No results for input(s): FE, TIBC, PSAT, FERR in the last 72 hours. No results found for: FOL, RBCF   No results for input(s): PH, PCO2, PO2 in the last 72 hours. No results for input(s): CPK, CKNDX, TROIQ in the last 72 hours.     No lab exists for component: CPKMB  No results found for: CHOL, CHOLX, CHLST, CHOLV, HDL, LDL, LDLC, DLDLP, TGLX, TRIGL, TRIGP, CHHD, CHHDX  Lab Results   Component Value Date/Time    Glucose (POC) 87 01/11/2018 05:27 PM    Glucose (POC) 74 01/11/2018 04:58 PM     Lab Results   Component Value Date/Time    Color YELLOW/STRAW 01/11/2018 10:31 PM    Appearance CLEAR 01/11/2018 10:31 PM    Specific gravity 1.007 01/11/2018 10:31 PM    pH (UA) 6.0 01/11/2018 10:31 PM    Protein NEGATIVE  01/11/2018 10:31 PM    Glucose NEGATIVE  01/11/2018 10:31 PM    Ketone NEGATIVE  01/11/2018 10:31 PM    Bilirubin NEGATIVE  01/11/2018 10:31 PM    Urobilinogen 0.2 01/11/2018 10:31 PM    Nitrites NEGATIVE  01/11/2018 10:31 PM    Leukocyte Esterase NEGATIVE  01/11/2018 10:31 PM    Epithelial cells FEW 01/11/2018 10:31 PM    Bacteria NEGATIVE  01/11/2018 10:31 PM    WBC 0-4 01/11/2018 10:31 PM    RBC 0-5 01/11/2018 10:31 PM         Medications Reviewed:     Current Facility-Administered Medications   Medication Dose Route Frequency    sodium chloride (NS) flush 5-10 mL  5-10 mL IntraVENous PRN    cefTRIAXone (ROCEPHIN) 2 g in 0.9% sodium chloride (MBP/ADV) 50 mL  2 g IntraVENous Q12H    sodium chloride (NS) flush 5-10 mL  5-10 mL IntraVENous Q8H    sodium chloride (NS) flush 5-10 mL  5-10 mL IntraVENous PRN    ondansetron (ZOFRAN ODT) tablet 4 mg  4 mg Oral Q4H PRN    acetaminophen (TYLENOL) solution 650 mg  650 mg Oral Q6H PRN    oxyCODONE IR (ROXICODONE) tablet 5 mg  5 mg Oral Q4H PRN    pantoprazole (PROTONIX) tablet 40 mg  40 mg Oral DAILY    ciprofloxacin-dexamethasone (CIPRODEX) 0.3-0.1 % otic suspension 4 Drop  4 Drop Right Ear BID    fluticasone (FLONASE) 50 mcg/actuation nasal spray 2 Spray  2 Spray Both Nostrils DAILY    tamsulosin (FLOMAX) capsule 0.4 mg  0.4 mg Oral DAILY    0.9% sodium chloride with KCl 40 mEq/L infusion   IntraVENous CONTINUOUS    azithromycin (ZITHROMAX) 500 mg in 0.9% sodium chloride (MBP/ADV) 250 mL  500 mg IntraVENous Q24H    lactobac ac& pc-s.therm-b.anim (JOSE L Q/RISAQUAD)  1 Cap Oral DAILY    oxymetazoline (AFRIN) 0.05 % nasal spray 2 Spray  2 Spray Both Nostrils BID PRN    oseltamivir (TAMIFLU) capsule 75 mg  75 mg Oral BID     ______________________________________________________________________  EXPECTED LENGTH OF STAY: 4d 21h  ACTUAL LENGTH OF STAY:          [id number] [facility], MD

## 2018-01-14 NOTE — PROGRESS NOTES
Nurse Janice Valverde gave bedside report to oncoming nurse Nathanael Barrios RN by [employer] and [facility]

## 2018-01-15 LAB
ANION GAP SERPL CALC-SCNC: 9 MMOL/L (ref 5–15)
BASOPHILS # BLD: 0.1 K/UL (ref 0–0.1)
BASOPHILS NFR BLD: 1 % (ref 0–1)
BLASTS NFR BLD MANUAL: 0 %
BUN SERPL-MCNC: 6 MG/DL (ref 6–20)
BUN/CREAT SERPL: 8 (ref 12–20)
CALCIUM SERPL-MCNC: 8.4 MG/DL (ref 8.5–10.1)
CHLORIDE SERPL-SCNC: 105 MMOL/L (ref 97–108)
CO2 SERPL-SCNC: 23 MMOL/L (ref 21–32)
CREAT SERPL-MCNC: 0.73 MG/DL (ref 0.7–1.3)
DIFFERENTIAL METHOD BLD: ABNORMAL
EOSINOPHIL # BLD: 0.1 K/UL (ref 0–0.4)
EOSINOPHIL NFR BLD: 1 % (ref 0–7)
ERYTHROCYTE [DISTWIDTH] IN BLOOD BY AUTOMATED COUNT: 13.3 % (ref 11.5–14.5)
GLUCOSE SERPL-MCNC: 114 MG/DL (ref 65–100)
HCT VFR BLD AUTO: 33.3 % (ref 36.6–50.3)
HGB BLD-MCNC: 10.9 G/DL (ref 12.1–17)
LYMPHOCYTES # BLD: 1.5 K/UL (ref 0.8–3.5)
LYMPHOCYTES NFR BLD: 18 % (ref 12–49)
MANUAL DIFFERENTIAL PERFORMED BLD QL: ABNORMAL
MCH RBC QN AUTO: 28.9 PG (ref 26–34)
MCHC RBC AUTO-ENTMCNC: 32.7 G/DL (ref 30–36.5)
MCV RBC AUTO: 88.3 FL (ref 80–99)
METAMYELOCYTES NFR BLD MANUAL: 1 %
MONOCYTES # BLD: 0.3 K/UL (ref 0–1)
MONOCYTES NFR BLD: 4 % (ref 5–13)
MYELOCYTES NFR BLD MANUAL: 0 %
NEUTS BAND NFR BLD MANUAL: 0 % (ref 0–6)
NEUTS SEG # BLD: 6.2 K/UL (ref 1.8–8)
NEUTS SEG NFR BLD: 75 % (ref 32–75)
NRBC # BLD: 0 K/UL (ref 0–0.01)
NRBC BLD-RTO: 0 PER 100 WBC
OTHER CELLS NFR BLD MANUAL: 0 %
PLATELET # BLD AUTO: 280 K/UL (ref 150–400)
POTASSIUM SERPL-SCNC: 4 MMOL/L (ref 3.5–5.1)
PROMYELOCYTES NFR BLD MANUAL: 0 %
RBC # BLD AUTO: 3.77 M/UL (ref 4.1–5.7)
RBC MORPH BLD: ABNORMAL
SODIUM SERPL-SCNC: 137 MMOL/L (ref 136–145)
WBC # BLD AUTO: 8.2 K/UL (ref 4.1–11.1)

## 2018-01-15 PROCEDURE — 80048 BASIC METABOLIC PNL TOTAL CA: CPT | Performed by: HOSPITALIST

## 2018-01-15 PROCEDURE — 74011000258 HC RX REV CODE- 258: Performed by: EMERGENCY MEDICINE

## 2018-01-15 PROCEDURE — 36415 COLL VENOUS BLD VENIPUNCTURE: CPT | Performed by: HOSPITALIST

## 2018-01-15 PROCEDURE — 74011250636 HC RX REV CODE- 250/636: Performed by: HOSPITALIST

## 2018-01-15 PROCEDURE — 85027 COMPLETE CBC AUTOMATED: CPT | Performed by: HOSPITALIST

## 2018-01-15 PROCEDURE — 74011250636 HC RX REV CODE- 250/636: Performed by: EMERGENCY MEDICINE

## 2018-01-15 PROCEDURE — 65270000029 HC RM PRIVATE

## 2018-01-15 PROCEDURE — 74011250637 HC RX REV CODE- 250/637: Performed by: INTERNAL MEDICINE

## 2018-01-15 PROCEDURE — 74011250637 HC RX REV CODE- 250/637: Performed by: HOSPITALIST

## 2018-01-15 RX ORDER — GUAIFENESIN 600 MG/1
600 TABLET, EXTENDED RELEASE ORAL EVERY 12 HOURS
Status: DISCONTINUED | OUTPATIENT
Start: 2018-01-15 | End: 2018-01-16 | Stop reason: HOSPADM

## 2018-01-15 RX ADMIN — PANTOPRAZOLE SODIUM 40 MG: 40 TABLET, DELAYED RELEASE ORAL at 09:20

## 2018-01-15 RX ADMIN — OSELTAMIVIR PHOSPHATE 75 MG: 75 CAPSULE ORAL at 09:20

## 2018-01-15 RX ADMIN — FLUTICASONE PROPIONATE 2 SPRAY: 50 SPRAY, METERED NASAL at 09:20

## 2018-01-15 RX ADMIN — CEFTRIAXONE 2 G: 2 INJECTION, POWDER, FOR SOLUTION INTRAMUSCULAR; INTRAVENOUS at 22:03

## 2018-01-15 RX ADMIN — CIPROFLOXACIN AND DEXAMETHASONE 4 DROP: 3; 1 SUSPENSION/ DROPS AURICULAR (OTIC) at 09:20

## 2018-01-15 RX ADMIN — Medication 1 CAPSULE: at 09:20

## 2018-01-15 RX ADMIN — AZITHROMYCIN MONOHYDRATE 500 MG: 500 INJECTION, POWDER, LYOPHILIZED, FOR SOLUTION INTRAVENOUS at 09:20

## 2018-01-15 RX ADMIN — CEFTRIAXONE 2 G: 2 INJECTION, POWDER, FOR SOLUTION INTRAMUSCULAR; INTRAVENOUS at 09:20

## 2018-01-15 RX ADMIN — CIPROFLOXACIN AND DEXAMETHASONE 4 DROP: 3; 1 SUSPENSION/ DROPS AURICULAR (OTIC) at 18:34

## 2018-01-15 RX ADMIN — SODIUM CHLORIDE AND POTASSIUM CHLORIDE: 9; 2.98 INJECTION, SOLUTION INTRAVENOUS at 04:07

## 2018-01-15 RX ADMIN — OSELTAMIVIR PHOSPHATE 75 MG: 75 CAPSULE ORAL at 19:03

## 2018-01-15 RX ADMIN — Medication 10 ML: at 22:03

## 2018-01-15 RX ADMIN — Medication 10 ML: at 06:07

## 2018-01-15 RX ADMIN — TAMSULOSIN HYDROCHLORIDE 0.4 MG: 0.4 CAPSULE ORAL at 09:20

## 2018-01-15 RX ADMIN — GUAIFENESIN 600 MG: 600 TABLET, EXTENDED RELEASE ORAL at 22:03

## 2018-01-15 NOTE — PROGRESS NOTES
Nurse Sena Mei gave bedside report to oncoming nurse Can Daniel RN by [employer] and [employer]

## 2018-01-15 NOTE — PROGRESS NOTES
Bedside shift change report given to Seth West (oncoming nurse) by Nikunj Matthews (offgoing nurse). Report included the following information SBAR.

## 2018-01-15 NOTE — PROGRESS NOTES
Bedside and Verbal shift change report given to Brandy Peterson RN (oncoming nurse) by Zeina Garay RN (offgoing nurse). Report included the following information SBAR, Kardex, Intake/Output, MAR and Recent Results.

## 2018-01-15 NOTE — PROGRESS NOTES
Physical Therapy Screening:  Services are not indicated at this time. [facility] based on results obtained during the nursing admission assessment. The patients chart was reviewed and the patient is not appropriate for a skilled therapy evaluation at this time. Please consult physical therapy if any therapy needs arise. Thank you.     Eric Delatorre, PT

## 2018-01-15 NOTE — PROGRESS NOTES
Bedside and Verbal shift change report given to Constance Mejia RN (oncoming nurse) by Leigh Guadalupe RN (offgoing nurse). Report included the following information SBAR, Kardex, Intake/Output, MAR and Recent Results.

## 2018-01-15 NOTE — PROGRESS NOTES
Raúl is a 75 y. o. with a history of vertigo/meniere's disease GERD and hypertension was in [facility] between 1/2/18-1/9/18. He was treated for pneumococcal bacteremia/meningits/rt mastoiditis/rt pneumonia  HE was sent home on IV ceftriaxone 2 grams Q12. After being discharged he started having fever of 101 and his wife talked to me yesterday and brought him back to the ED early this morning because of persistent fever  Wife says that his granddaughter is also sick with cough and fever and she had visited him in the hospital on Sunday 1/7/18 and hugged and kissed him     Medical history  Pt presented to [facility]  ER due to a change in mental status on 1/1/18.    workup at [facility] included CXR, CT head, lumbar puncture, blood cultures and urine culture. labs showed [place]BC of 316[address], Lactic acid of 2.4,   LP demonstrated 2300 white cells with 94% neutrophils,  CSF glucose < 10.  no RBC an total protein >300. GS CSF had many WBC's but no organisms. Sterling Whitaker He was transferred to [facility] for higher level of care. As per his wife  been feeling unwell since before gigi for the past 10 days. 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 HE started with cough and then had headache - He went to [facility] over the weekend and was given zpak and prednisone. He started developing fever the next day and was c/o headache, nausea and vomiting HE was also dizzy. He was restless the whole night and on Monday as his temp was 104 and he was taken to [facility] on 1/1/18. CT head with out contrast showed opacity involving portion of rt mastoid and middle ear cavity questioning otomastoiditis  He got a dose of zosyn and then had LP which was as above and then given ceftriaxone/vanco and transferred to [facility]     In [facility] he had a CT mastoid which showed opacification rt mastoid- ENT saw him and he was taken for mastoidectomy on 1/3/18.  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 He recovered with oxygen and it was 0.9 in 4 hrs and was in ICU overnight. He was discharged on 1/9  But he is back with fever        Subjective  Doing better  No fever  Cough persist  Has sore throat         Objective:   VITALS:     Patient Vitals for the past 12 hrs:   Temp Pulse Resp BP SpO2   01/15/18 0818 98.9 °F (37.2 °C) 88 16 129/83 94 %   01/15/18 0222 98.3 °F (36.8 °C) 75 16 113/70 94 %     PHYSICAL EXAM:   Awake and alert,  Lower lip- herpes   Chest b/l air entry-   Hs s1s2  Abd soft  Rt mastoid surgical site - no erythema or discharge   CNS-non focal        Pertinent Labs   WBC 15>13  HB 11.8>10.7  >304  Cr 0.94  Csf wbc 66 ( 57% N)  Protein 92  Glucose 37  Blood culture  Influenza A- H3N2 positive  Cdiff neg  Stool -occult blood positive         IMAGING RESULTS:      CT head-Chronic left parietal scalp thinning  with coarse dystrophic calcification.                     Impression/Recommendation  74 yo male with history of hypertension and GERD who is admitted with Fever after being discharged on 1/9     Fever- due to Influenza A H3N2- on tamiflu 75 mg BID for 5 days ( 1/16/18)-     Diarrhea-cdiff neg-occult blood positive     Bacterial meningitis due to pneumococcus being treated since 1/1/18- repeat LP much improved   Continue ceftriaxone 2 grams IV Q 12 -as planned until 1/22/18  ( see previous orders for home antibiotics from 1/8/18 progress note)       Right  otomastoiditis-   S/p Right tympanomastoidectomy.   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 Will need  IV ceftriaxone ( 1/22/18)      Rt hilar opacity- - ?  Pneumonia- persist from 1/2/  ? CT chest      HTN-          Discussed the management with patient and his wife  Recommend PT so that he could be prepared for discharge

## 2018-01-15 NOTE — PROGRESS NOTES
Hospitalist Progress Note  Grisel Harrison MD  Answering service: [phone] from in house phone  Cell:       Date of Service:  1/15/2018  NAME:  Jose Oliver  :  1950  MRN:  [id number]      Admission Summary:    The patient is a 58-year-old gentleman who was discharged from the hospital 2 days ago after he was hospitalized for acute bacterial meningitis. He was discharged on the  on IV antibiotics via a right PICC line. Since he left the hospital, he continued spiking a fever. The same night he was discharged, he had a temperature of 99.5. He had higher temperature yesterday of 101, and this morning he spiked a temperature of 100.7. They reached out to the infectious disease doctor yesterday, who advised them to return to the emergency room. Patient had new symptoms of diarrhea with 2-3 bowel movements since discharge. No recurrence of fever. Headache is improved. No photophobia, nausea or vomiting. Denies urinary symptoms. There is no redness, swelling and pain around the PICC line and he was compliant with his treatment. Interval history / Subjective:     Complaints of Diarrhea and dry cough. He denies any abdominal pain or nausea. Bowel movement is better had 1-2 loose BM yesterday     Assessment & Plan: 1. Influenza- On Tamiflu and droplet precautions - last dose on 18    2. Diarrhea-cdiff neg-occult blood positive probably antibiotic associated Diarrhea add probiotics. stool WBC neg; stool culture pending. 3.Bacterial meningitis due to pneumococcus being treated since 18- repeat LP much improved on Rocephin continue till 18. Previous LP showed LP demonstrated 2300 white cells with 94% neutrophils,  CSF glucose < 10.  no RBC an total protein >300. GS CSF had many WBC's but no organisms. Repeat LP on admission showed 72 WBC protein 92.      4.Right  otomastoiditis- S/p Right tympanomastoidectomy. Myringotomy tubes    5. Pneumococcus bacteremia with rt pneumonia and meningitis DAVI no endocarditis    HIV test-negative  Immuno compromised due to alcohol  Will need pneumococcal vaccines 13 and 23 after completion of antibiotic    6. 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 6.Rt  Lung base opacity- -  Pneumonia  Seen on CT :There is persistent right lower lobe pneumonia but this has decreased  significantly when compared with the CT scan of 1-2-18. On Rocephin and Zithromax. Advance diet as tolerated.      HTN-   Code status: full  DVT prophylaxis:     Care Plan discussed with: Patient/Family  Disposition: Home w/Family and TBD     Hospital Problems  Never Reviewed          Codes Class Noted POA    * (Principal)Sepsis ([facility] [address] ICD-10-CM: A41.9  ICD-9-CM: 038.9, 995.91  1/11/2018 Unknown        Meningitis ICD-10-CM: G03.9  ICD-9-CM: 322.9  1/11/2018 Unknown                Review of Systems:   A comprehensive review of systems was negative except for that written in the HPI. Vital Signs:    Last 24hrs VS reviewed since prior progress note. Most recent are:  Visit Vitals    /83 (BP 1 Location: Left arm, BP Patient Position: At rest)    Pulse 88    Temp 98.9 °F (37.2 °C)    Resp 16    Wt 88.9 kg (196 lb)    SpO2 94%    BMI 29.8 kg/m2         Intake/Output Summary (Last 24 hours) at 01/15/18 1227  Last data filed at 01/15/18 0818   Gross per 24 hour   Intake              720 ml   Output              300 ml   Net              420 ml        Physical Examination:             Constitutional:  No acute distress, cooperative, pleasant. Family members at the bedside   ENT:  Oral mucous moist, oropharynx benign. Neck supple,    Resp:  Few wheezing bilateral   CV:  Regular rhythm, normal rate, no murmurs, gallops, rubs    GI:  Soft, non distended, non tender.  normoactive bowel sounds, no hepatosplenomegaly     Musculoskeletal:  No edema, warm, 2+ pulses throughout    Neurologic:  Moves all extremities. AAOx3, CN II-XII reviewed     Psych:  Good insight, Not anxious nor agitated. Data Review:    Review and/or order of clinical lab test      Labs:     Recent Labs      01/15/18   0403  01/13/18   0302   WBC  8.2  10.8   HGB  10.9*  10.0*   HCT  33.3*  30.5*   PLT  280  275     Recent Labs      01/15/18   0403  01/13/18   0302   NA  137  135*   K  4.0  4.0   CL  105  104   CO2  23  21   BUN  6  6   CREA  0.73  0.75   GLU  114*  86   CA  8.4*  7.6*     No results for input(s): SGOT, GPT, ALT, AP, TBIL, TBILI, TP, ALB, GLOB, GGT, AML, LPSE in the last 72 hours. No lab exists for component: AMYP, HLPSE  No results for input(s): INR, PTP, APTT in the last 72 hours. No lab exists for component: INREXT, INREXT   No results for input(s): FE, TIBC, PSAT, FERR in the last 72 hours. No results found for: FOL, RBCF   No results for input(s): PH, PCO2, PO2 in the last 72 hours. No results for input(s): CPK, CKNDX, TROIQ in the last 72 hours.     No lab exists for component: CPKMB  No results found for: CHOL, CHOLX, CHLST, CHOLV, HDL, LDL, LDLC, DLDLP, TGLX, TRIGL, TRIGP, CHHD, CHHDX  Lab Results   Component Value Date/Time    Glucose (POC) 87 01/11/2018 05:27 PM    Glucose (POC) 74 01/11/2018 04:58 PM     Lab Results   Component Value Date/Time    Color YELLOW/STRAW 01/11/2018 10:31 PM    Appearance CLEAR 01/11/2018 10:31 PM    Specific gravity 1.007 01/11/2018 10:31 PM    pH (UA) 6.0 01/11/2018 10:31 PM    Protein NEGATIVE  01/11/2018 10:31 PM    Glucose NEGATIVE  01/11/2018 10:31 PM    Ketone NEGATIVE  01/11/2018 10:31 PM    Bilirubin NEGATIVE  01/11/2018 10:31 PM    Urobilinogen 0.2 01/11/2018 10:31 PM    Nitrites NEGATIVE  01/11/2018 10:31 PM    Leukocyte Esterase NEGATIVE  01/11/2018 10:31 PM    Epithelial cells FEW 01/11/2018 10:31 PM    Bacteria NEGATIVE  01/11/2018 10:31 PM    WBC 0-4 01/11/2018 10:31 PM    RBC 0-5 01/11/2018 10:31 PM Medications Reviewed:     Current Facility-Administered Medications   Medication Dose Route Frequency    sodium chloride (NS) flush 5-10 mL  5-10 mL IntraVENous PRN    cefTRIAXone (ROCEPHIN) 2 g in 0.9% sodium chloride (MBP/ADV) 50 mL  2 g IntraVENous Q12H    sodium chloride (NS) flush 5-10 mL  5-10 mL IntraVENous Q8H    sodium chloride (NS) flush 5-10 mL  5-10 mL IntraVENous PRN    ondansetron (ZOFRAN ODT) tablet 4 mg  4 mg Oral Q4H PRN    acetaminophen (TYLENOL) solution 650 mg  650 mg Oral Q6H PRN    oxyCODONE IR (ROXICODONE) tablet 5 mg  5 mg Oral Q4H PRN    pantoprazole (PROTONIX) tablet 40 mg  40 mg Oral DAILY    ciprofloxacin-dexamethasone (CIPRODEX) 0.3-0.1 % otic suspension 4 Drop  4 Drop Right Ear BID    fluticasone (FLONASE) 50 mcg/actuation nasal spray 2 Spray  2 Spray Both Nostrils DAILY    tamsulosin (FLOMAX) capsule 0.4 mg  0.4 mg Oral DAILY    0.9% sodium chloride with KCl 40 mEq/L infusion   IntraVENous CONTINUOUS    azithromycin (ZITHROMAX) 500 mg in 0.9% sodium chloride (MBP/ADV) 250 mL  500 mg IntraVENous Q24H    lactobac ac& pc-s.therm-b.anim (JOSE L Q/RISAQUAD)  1 Cap Oral DAILY    oxymetazoline (AFRIN) 0.05 % nasal spray 2 Spray  2 Spray Both Nostrils BID PRN    oseltamivir (TAMIFLU) capsule 75 mg  75 mg Oral BID     ______________________________________________________________________  EXPECTED LENGTH OF STAY: 4d 21h  ACTUAL LENGTH OF STAY:          4                 Nicolas Cano MD

## 2018-01-16 ENCOUNTER — HOME HEALTH ADMISSION (OUTPATIENT)
Dept: HOME HEALTH SERVICES | Facility: HOME HEALTH | Age: 68
End: 2018-01-16
Payer: MEDICARE

## 2018-01-16 VITALS
WEIGHT: 196 LBS | OXYGEN SATURATION: 95 % | TEMPERATURE: 99 F | DIASTOLIC BLOOD PRESSURE: 74 MMHG | HEART RATE: 91 BPM | SYSTOLIC BLOOD PRESSURE: 116 MMHG | BODY MASS INDEX: 29.8 KG/M2 | RESPIRATION RATE: 18 BRPM

## 2018-01-16 LAB
ANION GAP SERPL CALC-SCNC: 6 MMOL/L (ref 5–15)
BACTERIA SPEC CULT: NORMAL
BASOPHILS # BLD: 0.1 K/UL (ref 0–0.1)
BASOPHILS NFR BLD: 1 % (ref 0–1)
BUN SERPL-MCNC: 6 MG/DL (ref 6–20)
BUN/CREAT SERPL: 7 (ref 12–20)
C JEJUNI+C COLI AG STL QL: NEGATIVE
CALCIUM SERPL-MCNC: 8.6 MG/DL (ref 8.5–10.1)
CHLORIDE SERPL-SCNC: 104 MMOL/L (ref 97–108)
CO2 SERPL-SCNC: 26 MMOL/L (ref 21–32)
CREAT SERPL-MCNC: 0.84 MG/DL (ref 0.7–1.3)
E COLI SXT1+2 STL IA: NO GROWTH
EOSINOPHIL # BLD: 0.1 K/UL (ref 0–0.4)
EOSINOPHIL NFR BLD: 2 % (ref 0–7)
ERYTHROCYTE [DISTWIDTH] IN BLOOD BY AUTOMATED COUNT: 13.3 % (ref 11.5–14.5)
GLUCOSE SERPL-MCNC: 99 MG/DL (ref 65–100)
HCT VFR BLD AUTO: 31.6 % (ref 36.6–50.3)
HGB BLD-MCNC: 10.5 G/DL (ref 12.1–17)
LYMPHOCYTES # BLD: 1.1 K/UL (ref 0.8–3.5)
LYMPHOCYTES NFR BLD: 16 % (ref 12–49)
MCH RBC QN AUTO: 29.3 PG (ref 26–34)
MCHC RBC AUTO-ENTMCNC: 33.2 G/DL (ref 30–36.5)
MCV RBC AUTO: 88.3 FL (ref 80–99)
MONOCYTES # BLD: 0.5 K/UL (ref 0–1)
MONOCYTES NFR BLD: 7 % (ref 5–13)
NEUTS SEG # BLD: 5.4 K/UL (ref 1.8–8)
NEUTS SEG NFR BLD: 74 % (ref 32–75)
PLATELET # BLD AUTO: 262 K/UL (ref 150–400)
POTASSIUM SERPL-SCNC: 4.1 MMOL/L (ref 3.5–5.1)
RBC # BLD AUTO: 3.58 M/UL (ref 4.1–5.7)
SERVICE CMNT-IMP: NORMAL
SERVICE CMNT-IMP: NORMAL
SODIUM SERPL-SCNC: 136 MMOL/L (ref 136–145)
WBC # BLD AUTO: 7.3 K/UL (ref 4.1–11.1)

## 2018-01-16 PROCEDURE — 74011250636 HC RX REV CODE- 250/636: Performed by: EMERGENCY MEDICINE

## 2018-01-16 PROCEDURE — 82955 ASSAY OF G6PD ENZYME: CPT | Performed by: INTERNAL MEDICINE

## 2018-01-16 PROCEDURE — 74011250636 HC RX REV CODE- 250/636: Performed by: HOSPITALIST

## 2018-01-16 PROCEDURE — 85025 COMPLETE CBC W/AUTO DIFF WBC: CPT | Performed by: INTERNAL MEDICINE

## 2018-01-16 PROCEDURE — 97161 PT EVAL LOW COMPLEX 20 MIN: CPT

## 2018-01-16 PROCEDURE — 36415 COLL VENOUS BLD VENIPUNCTURE: CPT | Performed by: INTERNAL MEDICINE

## 2018-01-16 PROCEDURE — 74011000258 HC RX REV CODE- 258: Performed by: EMERGENCY MEDICINE

## 2018-01-16 PROCEDURE — 74011000250 HC RX REV CODE- 250: Performed by: INTERNAL MEDICINE

## 2018-01-16 PROCEDURE — 94640 AIRWAY INHALATION TREATMENT: CPT

## 2018-01-16 PROCEDURE — 74011250637 HC RX REV CODE- 250/637: Performed by: HOSPITALIST

## 2018-01-16 PROCEDURE — 74011250637 HC RX REV CODE- 250/637: Performed by: INTERNAL MEDICINE

## 2018-01-16 PROCEDURE — 80048 BASIC METABOLIC PNL TOTAL CA: CPT | Performed by: INTERNAL MEDICINE

## 2018-01-16 RX ORDER — GUAIFENESIN 600 MG/1
600 TABLET, EXTENDED RELEASE ORAL EVERY 12 HOURS
Qty: 30 TAB | Refills: 0 | Status: SHIPPED | OUTPATIENT
Start: 2018-01-16

## 2018-01-16 RX ORDER — ALBUTEROL SULFATE 90 UG/1
2 AEROSOL, METERED RESPIRATORY (INHALATION)
Status: DISCONTINUED | OUTPATIENT
Start: 2018-01-16 | End: 2018-01-16 | Stop reason: CLARIF

## 2018-01-16 RX ORDER — ALBUTEROL SULFATE 0.83 MG/ML
2.5 SOLUTION RESPIRATORY (INHALATION)
Status: DISCONTINUED | OUTPATIENT
Start: 2018-01-16 | End: 2018-01-16 | Stop reason: HOSPADM

## 2018-01-16 RX ORDER — OXYCODONE HYDROCHLORIDE 5 MG/1
5 TABLET ORAL
Qty: 12 TAB | Refills: 0 | Status: SHIPPED | OUTPATIENT
Start: 2018-01-16

## 2018-01-16 RX ORDER — IPRATROPIUM BROMIDE AND ALBUTEROL SULFATE 2.5; .5 MG/3ML; MG/3ML
3 SOLUTION RESPIRATORY (INHALATION)
Status: COMPLETED | OUTPATIENT
Start: 2018-01-16 | End: 2018-01-16

## 2018-01-16 RX ORDER — ALBUTEROL SULFATE 90 UG/1
2 AEROSOL, METERED RESPIRATORY (INHALATION)
Qty: 1 INHALER | Refills: 0 | Status: SHIPPED | OUTPATIENT
Start: 2018-01-16

## 2018-01-16 RX ORDER — ONDANSETRON 4 MG/1
4 TABLET, ORALLY DISINTEGRATING ORAL
Qty: 30 TAB | Refills: 0 | Status: SHIPPED | OUTPATIENT
Start: 2018-01-16

## 2018-01-16 RX ADMIN — GUAIFENESIN 600 MG: 600 TABLET, EXTENDED RELEASE ORAL at 09:34

## 2018-01-16 RX ADMIN — PANTOPRAZOLE SODIUM 40 MG: 40 TABLET, DELAYED RELEASE ORAL at 09:33

## 2018-01-16 RX ADMIN — CIPROFLOXACIN AND DEXAMETHASONE 4 DROP: 3; 1 SUSPENSION/ DROPS AURICULAR (OTIC) at 09:34

## 2018-01-16 RX ADMIN — FLUTICASONE PROPIONATE 2 SPRAY: 50 SPRAY, METERED NASAL at 09:34

## 2018-01-16 RX ADMIN — Medication 1 CAPSULE: at 09:33

## 2018-01-16 RX ADMIN — TAMSULOSIN HYDROCHLORIDE 0.4 MG: 0.4 CAPSULE ORAL at 09:34

## 2018-01-16 RX ADMIN — CEFTRIAXONE 2 G: 2 INJECTION, POWDER, FOR SOLUTION INTRAMUSCULAR; INTRAVENOUS at 09:34

## 2018-01-16 RX ADMIN — SODIUM CHLORIDE AND POTASSIUM CHLORIDE: 9; 2.98 INJECTION, SOLUTION INTRAVENOUS at 01:27

## 2018-01-16 RX ADMIN — OSELTAMIVIR PHOSPHATE 75 MG: 75 CAPSULE ORAL at 17:46

## 2018-01-16 RX ADMIN — OSELTAMIVIR PHOSPHATE 75 MG: 75 CAPSULE ORAL at 09:33

## 2018-01-16 RX ADMIN — IPRATROPIUM BROMIDE AND ALBUTEROL SULFATE 3 ML: .5; 3 SOLUTION RESPIRATORY (INHALATION) at 12:12

## 2018-01-16 RX ADMIN — Medication 10 ML: at 14:57

## 2018-01-16 RX ADMIN — AZITHROMYCIN MONOHYDRATE 500 MG: 500 INJECTION, POWDER, LYOPHILIZED, FOR SOLUTION INTRAVENOUS at 10:16

## 2018-01-16 NOTE — PROGRESS NOTES
Raúl is a 75 y. o. with a history of vertigo/meniere's disease GERD and hypertension was in [facility] between 1/2/18-1/9/18. He was treated for pneumococcal bacteremia/meningits/rt mastoiditis/rt pneumonia  HE was sent home on IV ceftriaxone 2 grams Q12. After being discharged he started having fever of 101 and his wife talked to me yesterday and brought him back to the ED early this morning because of persistent fever  Wife says that his granddaughter is also sick with cough and fever and she had visited him in the hospital on Sunday 1/7/18 and hugged and kissed him     Medical history  Pt presented to [facility]  ER due to a change in mental status on 1/1/18.    workup at [facility] included CXR, CT head, lumbar puncture, blood cultures and urine culture. [place] 20[address], Lactic acid of 2.4,   LP demonstrated 2300 white cells with 94% neutrophils,  CSF glucose < 10.  no RBC an total protein >300. GS CSF had many WBC's but no organisms. Nila Hemphill He was transferred to [facility] for higher level of care. As per his wife  been feeling unwell since before gigi for the past 10 days. 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 HE started with cough and then had headache - He went to [facility] over the weekend and was given zpak and prednisone. He started developing fever the next day and was c/o headache, nausea and vomiting HE was also dizzy. He was restless the whole night and on Monday as his temp was 104 and he was taken to [facility] on 1/1/18. CT head with out contrast showed opacity involving portion of rt mastoid and middle ear cavity questioning otomastoiditis  He got a dose of zosyn and then had LP which was as above and then given ceftriaxone/vanco and transferred to [facility]     In [facility] he had a CT mastoid which showed opacification rt mastoid- ENT saw him and he was taken for mastoidectomy on 1/3/18.  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 He recovered with oxygen and it was 0.9 in 4 hrs and was in ICU overnight. He was discharged on 1/9  But he is back with fever        Subjective  Doing better  No fever  Cough better           Objective:   VITALS:     Patient Vitals for the past 12 hrs:   Temp Pulse Resp BP SpO2   01/16/18 1210 - - - - 95 %   01/16/18 0946 97.1 °F (36.2 °C) 90 16 (!) 120/92 96 %     PHYSICAL EXAM:   Awake and alert,  Lower lip- herpes   Chest b/l air entry-   Hs s1s2  Abd soft  Rt mastoid surgical site - no erythema or discharge   CNS-non focal        Pertinent Labs   WBC 15>13  HB 11.8>10.7  >304  Cr 0.94  Csf wbc 66 ( 57% N)  Protein 92  Glucose 37  Blood culture  Influenza A- H3N2 positive  Cdiff neg  Stool -occult blood positive         IMAGING RESULTS:      CT head-Chronic left parietal scalp thinning  with coarse dystrophic calcification.                     Impression/Recommendation  76 yo male with history of hypertension and GERD who is admitted with Fever after being discharged on 1/9     Fever- has resolved due to Influenza A H3N2- on tamiflu 75 mg BID for 5 days ( 1/16/18)-     Diarrhea-cdiff neg-antibiotic related  occult blood positive     Bacterial meningitis due to pneumococcus being treated since 1/1/18- repeat LP much improved   Continue ceftriaxone 2 grams IV Q 12 -as planned until 1/22/18  (  orders for home antibiotics rewritten       Right  otomastoiditis-   S/p Right tympanomastoidectomy.   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 Will need  IV ceftriaxone ( 1/22/18)      Rt hilar opacity- - ?  Pneumonia- persist from 1/2/  ? CT chest as OP      HTN-        seen by PT- no rehab needed- he is being discharged today  Discussed the management with patient and Misty Michel

## 2018-01-16 NOTE — PROGRESS NOTES
physical Therapy EVALUATION/DISCHARGE  Patient: Jodi Goldberg (17 y.o. male)  Date: 1/16/2018  Primary Diagnosis: Sepsis ([address]        Precautions: Droplet     ASSESSMENT :  Based on the objective data described below, the patient presents with good strength, good functional mobility, and steady gait following admission for influenza complicated by recent dx of bacterial meningitis, pneumonia and R tympanomastoidectomy. PTA patient was independent with all functional mobility and was very active on his property in [place], [place]. Patient lives with his wife on the first level of a two-level home with 4 KYLER with rail. Currently, patient is independent with all aspects of functional mobility but generally fatigued due to his recent multiple illnesses. Patient ambulated 200' with steady balance and normal gait pattern, slightly elevated HR at 118 bpm (100 bpm at rest) but SpO2 remained > 95% on RA. Patient left in bed at the conclusion of PT evaluation but encouarged to get up ad leonor in his room and walk to improve his endurance and prevent the effects of bedrest. Patient presents at baseline level of function at this time and skilled PT services are not indicated at this time. Will complete order. Skilled physical therapy is not indicated at this time. PLAN :  Discharge Recommendations: None  Further Equipment Recommendations for Discharge: None     SUBJECTIVE:   Patient stated I've had a rough time since the new year.     OBJECTIVE DATA SUMMARY:   HISTORY:    Past Medical History:   Diagnosis Date    Bacterial meningitis     GERD (gastroesophageal reflux disease)     Hypertension    History reviewed. No pertinent surgical history.   Prior Level of Function/Home Situation:   Personal factors and/or comorbidities impacting plan of care:     Home Situation  Home Environment: Private residence  # Steps to Enter: 4  Rails to Enter: Yes  One/Two Story Residence: Two story, live on 1st floor  Living Alone: No  Support Systems: Spouse/Significant Other/Partner, Family member(s), Friends \ neighbors  Patient Expects to be Discharged to[de-identified] Private residence  Current DME Used/Available at Home: None    EXAMINATION/PRESENTATION/DECISION MAKING:   Critical Behavior:  Neurologic State: Alert  Orientation Level: Oriented X4  Cognition: Appropriate decision making, Appropriate safety awareness     Hearing: Auditory  Auditory Impairment: None  Skin:  See nursing sheet  Edema: none  Range Of Motion:  AROM: Within functional limits           PROM: Within functional limits           Strength:    Strength: Within functional limits                    Tone & Sensation:   Tone: Normal              Sensation: Intact               Coordination:  Coordination: Within functional limits  Vision:      Functional Mobility:  Bed Mobility:  Rolling: Independent  Supine to Sit: Independent  Sit to Supine: Independent  Scooting: Independent  Transfers:  Sit to Stand: Independent  Stand to Sit: Independent        Bed to Chair: Independent              Balance:   Sitting: Intact; Without support  Standing: Intact; Without support  Ambulation/Gait Training:  Distance (ft): 200 Feet (ft)  Assistive Device: Gait belt  Ambulation - Level of Assistance: Independent                                                    Stairs:              Functional Measure:  Timed up and go:    Timed Get Up And Go Test: 11     Timed Up and Go and G-code impairment scale:  Percentage of Impairment CH    0%   CI    1-19% CJ    20-39% CK    40-59% CL    60-79% CM    80-99% CN     100%   Timed   Score 0-56 10 11-12 13-14 15-16 17-18 19 20       < than 10 seconds=Normal  Greater then 13.5 seconds (in elderly)=Increased fall risk   Madison Wade [place] M. Predicting the probability for falls in community dwelling older adults using the Timed Up and Go Test. Phys Ther. 2000;80:896-903. G codes:   In compliance with CMSs Claims Based Outcome Reporting, the following G-code set was chosen for this patient based on their primary functional limitation being treated: The outcome measure chosen to determine the severity of the functional limitation was the TUG with a score of 11 sec which was correlated with the impairment scale. ? Mobility - Walking and Moving Around:     - CURRENT STATUS: CI - 1%-19% impaired, limited or restricted    - GOAL STATUS: CI - 1%-19% impaired, limited or restricted    - D/C STATUS:  CI - 1%-19% impaired, limited or restricted        Physical Therapy Evaluation Charge Determination   History Examination Presentation Decision-Making   MEDIUM  Complexity : 1-2 comorbidities / personal factors will impact the outcome/ POC  LOW Complexity : 1-2 Standardized tests and measures addressing body structure, function, activity limitation and / or participation in recreation  LOW Complexity : Stable, uncomplicated  Other outcome measures TUG 11 sec  LOW       Based on the above components, the patient evaluation is determined to be of the following complexity level: LOW     Pain:  Pain Scale 1: Numeric (0 - 10)  Pain Intensity 1: 0     Activity Tolerance:   Reduced from baseline but functional, 200' ambulation    Please refer to the flowsheet for vital signs taken during this treatment. After treatment:   []   Patient left in no apparent distress sitting up in chair  [x]   Patient left in no apparent distress in bed  [x]   Call bell left within reach  [x]   Nursing notified  [x]   Caregiver present  []   Bed alarm activated    COMMUNICATION/EDUCATION:   Communication/Collaboration:  [x]   Fall prevention education was provided and the patient/caregiver indicated understanding. [x]   Patient/family have participated as able and agree with findings and recommendations. []   Patient is unable to participate in plan of care at this time.   Findings and recommendations were discussed with: Registered Nurse    Thank you for this referral.  Vinayak Later, PT   Time Calculation: 17 mins

## 2018-01-16 NOTE — PROGRESS NOTES
OPAT  1) Care of Midline including biopatch  2) Ceftriaxone 2 grams IV Every 12 hours until 1/22/18  3) CBC/CMP on  1/23/18  4) remove midline on 1/23/18  5) Fax results to [phone] promptly  6) call Cristian Nguyen on [phone] with any questions or for critical value

## 2018-01-16 NOTE — PROGRESS NOTES
Problem: Falls - Risk of  Goal: *Absence of Falls  Document Lsibeth Fall Risk and appropriate interventions in the flowsheet.    Outcome: Progressing Towards Goal  Fall Risk Interventions:  Mobility Interventions: Patient to call before getting OOB         Medication Interventions: Patient to call before getting OOB    Elimination Interventions: Call light in reach

## 2018-01-16 NOTE — PROGRESS NOTES
Chart reviewed. CM noted that physical therapy evaluated patient today and gave no recommendations for discharge. Patient was open with [facility] for home IV abx and [address] [place] [address] ([facility] office) PTA. CM will follow for orders to resume services at discharge. 3:55 PM: Spoke with MD who plans to discharge patient today. CM noted home IV abx orders from ID and sent referral to HCP via [employer]. Requested [place] orders from MD and sent referral to [address] [place] [address] via [facility]. CM spoke with wife via phone, she is aware of discharge and is on the way to [address] to come get the patient. Wife stated there are still several bags of abx in their fridge at home. CM spoke with Hortencia Gaspar with HCP and confirmed patient is discharging on the same drug. HCP will reach out to wife to inform that they can use the remaining abx that they already have at home. CM called [address] [place] [address] ([phone]) and spoke with Vernon Lynne, confirmed they can see patient tomorrow.       Gill Goss, BSW/CRM

## 2018-01-16 NOTE — PROGRESS NOTES
Bedside shift change report given to [address] [place] [address] Line Mario CURRY (oncoming nurse) by Chacorta Maradiaga (offgoing nurse). Report included the following information SBAR, Kardex, Intake/Output, MAR and Recent Results.

## 2018-01-16 NOTE — PROGRESS NOTES
Bedside and Verbal shift change report given to Hortencia Puri (oncoming nurse) by Lisa Zuluaga (offgoing nurse). Report included the following information SBAR, Kardex, Intake/Output, MAR and Recent Results.

## 2018-01-16 NOTE — DISCHARGE SUMMARY
Discharge Summary    Patient: Shahla Mack               Sex: male          DOA: 1/11/2018         YOB: 1950      Age:  70 y.o.        LOS:  LOS: 5 days                Admit Date: 1/11/2018    Discharge Date: 1/16/2018    Admission Diagnoses: Sepsis [facility])    Discharge Diagnoses:    Problem List as of 1/16/2018  Never Reviewed          Codes Class Noted - Resolved    * (Principal)Sepsis ([facility] [address] ICD-10-CM: A41.9  ICD-9-CM: 038.9, 995.91  1/11/2018 - Present        Meningitis ICD-10-CM: G03.9  ICD-9-CM: 322.9  1/11/2018 - Present        Pneumonia ICD-10-CM: J18.9  ICD-9-CM: 752  1/2/2018 - Present        Bacterial meningitis ICD-10-CM: G00.9  ICD-9-CM: 320.9  1/2/2018 - Present              Discharge Medications:     Current Discharge Medication List      START taking these medications    Details   guaiFENesin ER (MUCINEX) 600 mg ER tablet Take 1 Tab by mouth every twelve (12) hours. Qty: 30 Tab, Refills: 0      ondansetron (ZOFRAN ODT) 4 mg disintegrating tablet Take 1 Tab by mouth every four (4) hours as needed. Qty: 30 Tab, Refills: 0      oxyCODONE IR (ROXICODONE) 5 mg immediate release tablet Take 1 Tab by mouth every four (4) hours as needed. Max Daily Amount: 30 mg.  Qty: 12 Tab, Refills: 0    Associated Diagnoses: Bacterial meningitis      cefTRIAXone 2 gram 2 g, ADDaptor 1 Device IVPB 2 g by IntraVENous route every twelve (12) hours every twelve (12) hours. Qty: 12 Dose, Refills: 0         CONTINUE these medications which have NOT CHANGED    Details   cefTRIAXone (ROCEPHIN) 1 gram injection 2 g by IntraVENous route every twelve (12) hours every twelve (12) hours. 0.9 % SODIUM CHLORIDE (NORMAL SALINE FLUSH INJECTION) 10 mL by IntraVENous route daily. before and after each antibiotic infusion      heparin, porcine, in ns (HEPARIN FLUSH) 10 unit/mL kit 3 mL by IntraVENous route daily.  after final saline flush using s-a-s-h technique      ciprofloxacin-dexamethasone (CIPRODEX) 0.3-0.1 % otic suspension Administer 4 Drops in right ear two (2) times a day for 10 days. Indications: Acute Otitis Media with Tympanostomy Tubes  Qty: 7.5 mL, Refills: 0      butalbital-acetaminophen-caffeine (FIORICET, ESGIC) -40 mg per tablet Take 1 Tab by mouth every four (4) hours as needed for Headache. Indications: TENSION-TYPE HEADACHE  Qty: 10 Tab, Refills: 0      fluticasone (FLONASE) 50 mcg/actuation nasal spray 2 Sprays by Both Nostrils route daily. irbesartan (AVAPRO) 150 mg tablet Take 150 mg by mouth nightly. Omeprazole delayed release (PRILOSEC D/R) 20 mg tablet Take 20 mg by mouth daily. tamsulosin (FLOMAX) 0.4 mg capsule Take 0.4 mg by mouth daily. oxymetazoline (MARIA ELENA-SYNEPHRINE 12 H SPR, OXYM,) 0.05 % nasal spray 2 Sprays two (2) times daily as needed. Follow-up:pcp and infectious disease    Discharge Condition:good    Activity:as tolerated    Diet:heart healthy    Labs:  Labs: Results:       Chemistry Recent Labs      01/16/18   0648  01/15/18   0403   GLU  99  114*   NA  136  137   K  4.1  4.0   CL  104  105   CO2  26  23   BUN  6  6   CREA  0.84  0.73   CA  8.6  8.4*   AGAP  6  9   BUCR  7*  8*      CBC w/Diff Recent Labs      01/16/18   0648  01/15/18   0403   WBC  7.3  8.2   RBC  3.58*  3.77*   HGB  10.5*  10.9*   HCT  31.6*  33.3*   PLT  262  280   GRANS  74  75   LYMPH  16  18   EOS  2  1      Cardiac Enzymes No results for input(s): CPK, CKND1, XIAO in the last 72 hours. No lab exists for component: CKRMB, TROIP   Coagulation No results for input(s): PTP, INR, APTT in the last 72 hours. No lab exists for component: INREXT    Lipid Panel No results found for: CHOL, CHOLPOCT, CHOLX, CHLST, CHOLV, 311075, HDL, LDL, LDLC, DLDLP, 578288, VLDLC, VLDL, TGLX, TRIGL, TRIGP, TGLPOCT, CHHD, CHHDX   BNP No results for input(s): BNPP in the last 72 hours. Liver Enzymes No results for input(s): TP, ALB, TBIL, AP, SGOT, GPT in the last 72 hours.     No lab exists for component: DBIL   Thyroid Studies No results found for: T4, T3U, TSH, TSHEXT       Imaging:  CXR on 1/11/2017  IMPRESSION: Patch of opacification right lung base medially.     CT head on 1/11/2018  IMPRESSION: No acute intracranial findings. Chronic left parietal scalp thinning  with coarse dystrophic calcification.       CT chest on 1/12/2018  1. There is persistent right lower lobe pneumonia but this has decreased  significantly when compared with the CT scan of 1-2-18. Follow-up to resolution  is suggested. There are bibasilar areas of atelectasis adjacent to the new  pleural effusions. The central airways are patent. Follow-up to resolution is  suggested. 2. The apical segmental bronchus arises directly from the right mainstem  bronchus which is a normal variant. 3. Possible pulmonary artery hypertension. 4. Subcarinal lymph node is enlarged this could be reactive to the pneumonia in  the right lower lobe    Consults:   Infectious disease    Treatment Team: Treatment Team: Attending Provider: James Patel MD; Consulting Provider: Sugar Singh MD; Consulting Provider: Deep Babb MD; Hospitalist: Deep Babb MD; Utilization Review: Lorraine López RN; Care Manager: Marisel Salvador    Significant Diagnostic Studies:see recent lab results    HISTORY OF PRESENT ILLNESS:  The patient is a 57-year-old gentleman who was discharged from the hospital 2 days ago after he was hospitalized for acute bacterial meningitis. He was discharged on the 9th on IV antibiotics via a right PICC line. Since he left the hospital, he continued spiking a fever. The same night he was discharged, he had a temperature of 99.5. He had higher temperature yesterday of 101, and this morning he spiked a temperature of 100.7. They reached out to the infectious disease doctor yesterday, who advised them to return to the emergency room. Patient had new symptoms of diarrhea with 2-3 bowel movements since discharge. No recurrence of fever. Headache is improved. No photophobia, nausea or vomiting. Denies urinary symptoms. There is no redness, swelling and pain around the PICC line and he was compliant with his treatment.       Upon arrival in the ER, he was found to have a temperature of 100.5 and subsequently 102.7, heart rate 107. His blood pressure was 150/80, respiration of 18, oxygen saturation of 99% on room air. His blood work showed a leukocytosis with 92% neutrophil. Serum chemistry shows sodium of 134, potassium of 3.4. Blood culture and CSF culture collected. CT of the head is pending. A portable chest x-ray showed patchy opacification, right lung base, medially, and an EKG showed sinus rhythm. Infectious Disease was consulted and asked for admission evaluation. The ED had administered normal saline at 30 mL/kg, Tylenol 1000 mg and antibiotics, erythromycin and his ceftriaxone were continued.      Per review of records, the patient was discharged on ceftriaxone 2 grams b.i.d. to be completed on the 22nd. During that admission, the patient had otomastoiditis and he underwent tympanomastoidectomy and ear tube right with the placement of the facial nerve electrodes. During that hospitalization, he developed what was believed to be methemoglobinemia and he was encephalopathic, toxic metabolic. Hospital Course: 1. Influenza- On Tamiflu and droplet precautions - last dose on 1/16/18     2. Diarrhea-cdiff neg-occult blood positive probably antibiotic associated Diarrhea,added probiotics. stool WBC neg; stool culture negative. Diarrhea resolved     3. Bacterial meningitis due to pneumococcus being treated since 1/1/18- repeat LP much improved on Rocephin continue till 1/22/18. Previous LP showed LP demonstrated 2300 white cells with 94% neutrophils,  CSF glucose < 10.  no RBC an total protein >300. GS CSF had many WBC's but no organisms.   Repeat LP on admission showed 72 WBC protein 92.      4.Right  otomastoiditis- S/p Right tympanomastoidectomy. Myringotomy tubes     5. Pneumococcus bacteremia with rt pneumonia and meningitis DAVI no endocarditis    HIV test-negative  Immuno compromised due to alcohol  Will need pneumococcal vaccines 13 and 23 after completion of antibiotic     6. 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     7.Rt  Lung base opacity- -  Pneumonia  Seen on CT :There is persistent right lower lobe pneumonia but this has decreased  significantly when compared with the CT scan of 1-2-18. On Rocephin and Zithromax. D/c zithromax. Pt is on ceftriaxone.     8. HTN- continue routine medications    Code status: full    Patient clinically stable for discharge. Case d/w ID(),CM,Patient and Pt's wife    Time for discharge:50 minutes.     Misty Diaz MD  January 16, 2018

## 2018-01-16 NOTE — DISCHARGE INSTRUCTIONS
Patient: Vinayak Wolf               Sex: male          DOA: 1/11/2018         YOB: 1950      Age:  70 y.o.        LOS:  LOS: 5 days                 Admit Date: 1/11/2018     Discharge Date: 1/16/2018     Admission Diagnoses: Sepsis [facility])     Discharge Diagnoses:    Problem List as of 1/16/2018  Never Reviewed             Codes Class Noted - Resolved     * (Principal)Sepsis ([facility] [address] ICD-10-CM: A41.9  ICD-9-CM: 038.9, 995.91   1/11/2018 - Present           Meningitis ICD-10-CM: G03.9  ICD-9-CM: 951. 9   1/11/2018 - Present           Pneumonia ICD-10-CM: J18.9  ICD-9-CM: 852   1/2/2018 - Present           Bacterial meningitis ICD-10-CM: G00.9  ICD-9-CM: 320. 9   1/2/2018 - Present                  Discharge Medications:          Current Discharge Medication List            START taking these medications     Details   guaiFENesin ER (MUCINEX) 600 mg ER tablet Take 1 Tab by mouth every twelve (12) hours. Qty: 30 Tab, Refills: 0       ondansetron (ZOFRAN ODT) 4 mg disintegrating tablet Take 1 Tab by mouth every four (4) hours as needed. Qty: 30 Tab, Refills: 0       oxyCODONE IR (ROXICODONE) 5 mg immediate release tablet Take 1 Tab by mouth every four (4) hours as needed. Max Daily Amount: 30 mg.  Qty: 12 Tab, Refills: 0     Associated Diagnoses: Bacterial meningitis       cefTRIAXone 2 gram 2 g, ADDaptor 1 Device IVPB 2 g by IntraVENous route every twelve (12) hours every twelve (12) hours. Qty: 12 Dose, Refills: 0               CONTINUE these medications which have NOT CHANGED     Details   cefTRIAXone (ROCEPHIN) 1 gram injection 2 g by IntraVENous route every twelve (12) hours every twelve (12) hours.       0.9 % SODIUM CHLORIDE (NORMAL SALINE FLUSH INJECTION) 10 mL by IntraVENous route daily. before and after each antibiotic infusion       heparin, porcine, in ns (HEPARIN FLUSH) 10 unit/mL kit 3 mL by IntraVENous route daily.  after final saline flush using s-a-s-h technique     ciprofloxacin-dexamethasone (CIPRODEX) 0.3-0.1 % otic suspension Administer 4 Drops in right ear two (2) times a day for 10 days. Indications: Acute Otitis Media with Tympanostomy Tubes  Qty: 7.5 mL, Refills: 0       butalbital-acetaminophen-caffeine (FIORICET, ESGIC) -40 mg per tablet Take 1 Tab by mouth every four (4) hours as needed for Headache. Indications: TENSION-TYPE HEADACHE  Qty: 10 Tab, Refills: 0       fluticasone (FLONASE) 50 mcg/actuation nasal spray 2 Sprays by Both Nostrils route daily.       irbesartan (AVAPRO) 150 mg tablet Take 150 mg by mouth nightly.       Omeprazole delayed release (PRILOSEC D/R) 20 mg tablet Take 20 mg by mouth daily.       tamsulosin (FLOMAX) 0.4 mg capsule Take 0.4 mg by mouth daily.       oxymetazoline (MARIA ELENA-SYNEPHRINE 12 H SPR, OXYM,) 0.05 % nasal spray 2 Sprays two (2) times daily as needed.                 Follow-up:pcp and infectious disease     Discharge Condition:good     Activity:as tolerated     Diet:heart healthy     Labs:  Labs: Results:         Chemistry      Recent Labs   Conchita Cage  01/16/18   0648  01/15/18   0403   GLU  99  114*   NA  136  137   K  4.1  4.0   CL  104  105   CO2  26  23   BUN  6  6   CREA  0.84  0.73   CA  8.6  8.4*   AGAP  6  9   BUCR  7*  8*       CBC w/Diff      Recent Labs       01/16/18   0648  01/15/18   0403   WBC  7.3  8.2   RBC  3.58*  3.77*   HGB  10.5*  10.9*   HCT  31.6*  33.3*   PLT  262  280   GRANS  74  75   LYMPH  16  18   EOS  2  1       Cardiac Enzymes No results for input(s): CPK, CKND1, XIAO in the last 72 hours.     No lab exists for component: CKRMB, TROIP   Coagulation No results for input(s): PTP, INR, APTT in the last 72 hours.     No lab exists for component: INREXT    Lipid Panel No results found for: CHOL, CHOLPOCT, CHOLX, CHLST, CHOLV, 322230, HDL, LDL, LDLC, DLDLP, 239484, VLDLC, VLDL, TGLX, TRIGL, TRIGP, TGLPOCT, CHHD, CHHDX   BNP No results for input(s): BNPP in the last 72 hours.    Liver Enzymes No results for input(s): TP, ALB, TBIL, AP, SGOT, GPT in the last 72 hours.     No lab exists for component: DBIL   Thyroid Studies No results found for: T4, T3U, TSH, TSHEXT        Imaging:  CXR on 1/11/2017  IMPRESSION: Patch of opacification right lung base medially.      CT head on 1/11/2018  IMPRESSION: No acute intracranial findings. Chronic left parietal scalp thinning  with coarse dystrophic calcification.        CT chest on 1/12/2018  1. There is persistent right lower lobe pneumonia but this has decreased  significantly when compared with the CT scan of 1-2-18. Follow-up to resolution  is suggested. There are bibasilar areas of atelectasis adjacent to the new  pleural effusions. The central airways are patent. Follow-up to resolution is  suggested. 2. The apical segmental bronchus arises directly from the right mainstem  bronchus which is a normal variant. 3. Possible pulmonary artery hypertension.   4. Subcarinal lymph node is enlarged this could be reactive to the pneumonia in  the right lower lobe     Consults:   Infectious disease     Treatment Team: Treatment Team: Attending Provider: Poly Doss MD; Consulting Provider: Maco Miranda MD; Consulting Provider: Sneha Byrd MD; Hospitalist: Sneha Byrd MD; Utilization Review: Jin Martinez RN; Care Manager: Gaudencio Johnson     Significant Diagnostic Studies:see recent lab results     HISTORY OF PRESENT ILLNESS:  The patient is a 39-year-old gentleman who was discharged from the hospital 2 days ago after he was hospitalized for acute bacterial meningitis. [facility] was discharged on the 9th on IV antibiotics via a right PICC line.  Since he left the hospital, he continued spiking a fever.  The same night he was discharged, he had a temperature of 99.5.  He had higher temperature yesterday of 101, and this morning he spiked a temperature of 100.7.  They reached out to the infectious disease doctor yesterday, who advised them to return to the emergency room.  Patient had new symptoms of diarrhea with 2-3 bowel movements since discharge.  No recurrence of fever.  Headache is improved.  No photophobia, nausea or vomiting.  Denies urinary symptoms. Paris Goodeide is no redness, swelling and pain around the PICC line and he was compliant with his treatment.        Upon arrival in the ER, he was found to have a temperature of 100.5 and subsequently 102.7, heart rate 107.  His blood pressure was 150/80, respiration of 18, oxygen saturation of 99% on room air.  His blood work showed a leukocytosis with 92% neutrophil.  Serum chemistry shows sodium of 134, potassium of 3.4.  Blood culture and CSF culture collected.  CT of the head is pending.  A portable chest x-ray showed patchy opacification, right lung base, medially, and an EKG showed sinus rhythm.  Infectious Disease was consulted and asked for admission evaluation.  The ED had administered normal saline at 30 mL/kg, Tylenol 1000 mg and antibiotics, erythromycin and his ceftriaxone were continued.       Per review of records, the patient was discharged on ceftriaxone 2 grams b.i.d. to be completed on the 22nd.  During that admission, the patient had otomastoiditis and he underwent tympanomastoidectomy and ear tube right with the placement of the facial nerve electrodes.  During that hospitalization, he developed what was believed to be methemoglobinemia and he was encephalopathic, toxic metabolic.     Hospital Course: 1. Influenza- On Tamiflu and droplet precautions - last dose on 1/16/18      2. Diarrhea-cdiff neg-occult blood positive probably antibiotic associated Diarrhea,added probiotics.  stool WBC neg; stool culture negative. Diarrhea resolved      3. Bacterial meningitis due to pneumococcus being treated since 1/1/18- repeat LP much improved on Rocephin continue till 1/22/18. Previous LP showed LP demonstrated 2300 white cells with 94% neutrophils,  CSF glucose < 10.  no RBC an total protein >300.  GS CSF had many WBC's but no organisms. Repeat LP on admission showed 72 WBC protein 92.       4. Right  otomastoiditis- S/p Right tympanomastoidectomy. Myringotomy tubes      5. Pneumococcus bacteremia with rt pneumonia and meningitis DAVI no endocarditis    HIV test-negative  Immuno compromised due to alcohol  Will need pneumococcal vaccines 13 and 23 after completion of antibiotic      6. 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      7. Rt  Lung base opacity- -  Pneumonia  Seen on CT :There is persistent right lower lobe pneumonia but this has decreased  significantly when compared with the CT scan of 1-2-18. On Rocephin and Zithromax. D/c zithromax. Pt is on ceftriaxone.      8. HTN- continue routine medications     Code status: full     Patient clinically stable for discharge.   Case d/w ID(),CM,Patient and Pt's wife     Time for discharge:50 minutes.     Petty Lam MD  January 16, 2018

## 2018-01-17 ENCOUNTER — HOME CARE VISIT (OUTPATIENT)
Dept: SCHEDULING | Facility: HOME HEALTH | Age: 68
End: 2018-01-17
Payer: MEDICARE

## 2018-01-17 PROCEDURE — 400013 HH SOC

## 2018-01-17 PROCEDURE — 3331090002 HH PPS REVENUE DEBIT

## 2018-01-17 PROCEDURE — G0299 HHS/HOSPICE OF RN EA 15 MIN: HCPCS

## 2018-01-17 PROCEDURE — 3331090001 HH PPS REVENUE CREDIT

## 2018-01-18 ENCOUNTER — HOME CARE VISIT (OUTPATIENT)
Dept: SCHEDULING | Facility: HOME HEALTH | Age: 68
End: 2018-01-18
Payer: MEDICARE

## 2018-01-18 VITALS
RESPIRATION RATE: 20 BRPM | HEART RATE: 92 BPM | SYSTOLIC BLOOD PRESSURE: 108 MMHG | TEMPERATURE: 97.8 F | OXYGEN SATURATION: 97 % | DIASTOLIC BLOOD PRESSURE: 70 MMHG

## 2018-01-18 LAB
BACTERIA SPEC CULT: NORMAL
BACTERIA SPEC CULT: NORMAL
G6PD BLD QN: 279 U/10E12 RBC (ref 146–376)
GRAM STN SPEC: NORMAL
RBC # BLD AUTO: 3.53 X10E6/UL (ref 4.14–5.8)
SERVICE CMNT-IMP: NORMAL

## 2018-01-18 PROCEDURE — 3331090001 HH PPS REVENUE CREDIT

## 2018-01-18 PROCEDURE — 3331090002 HH PPS REVENUE DEBIT

## 2018-01-18 PROCEDURE — G0299 HHS/HOSPICE OF RN EA 15 MIN: HCPCS

## 2018-01-19 VITALS
OXYGEN SATURATION: 98 % | RESPIRATION RATE: 20 BRPM | DIASTOLIC BLOOD PRESSURE: 74 MMHG | TEMPERATURE: 98.3 F | SYSTOLIC BLOOD PRESSURE: 122 MMHG | HEART RATE: 88 BPM

## 2018-01-19 PROCEDURE — 3331090002 HH PPS REVENUE DEBIT

## 2018-01-19 PROCEDURE — 3331090001 HH PPS REVENUE CREDIT

## 2018-01-20 PROCEDURE — 3331090002 HH PPS REVENUE DEBIT

## 2018-01-20 PROCEDURE — 3331090001 HH PPS REVENUE CREDIT

## 2018-01-21 PROCEDURE — 3331090002 HH PPS REVENUE DEBIT

## 2018-01-21 PROCEDURE — 3331090001 HH PPS REVENUE CREDIT

## 2018-01-22 ENCOUNTER — HOME CARE VISIT (OUTPATIENT)
Dept: SCHEDULING | Facility: HOME HEALTH | Age: 68
End: 2018-01-22
Payer: MEDICARE

## 2018-01-22 ENCOUNTER — HOME CARE VISIT (OUTPATIENT)
Dept: HOME HEALTH SERVICES | Facility: HOME HEALTH | Age: 68
End: 2018-01-22
Payer: MEDICARE

## 2018-01-22 PROCEDURE — G0299 HHS/HOSPICE OF RN EA 15 MIN: HCPCS

## 2018-01-22 PROCEDURE — 3331090002 HH PPS REVENUE DEBIT

## 2018-01-22 PROCEDURE — 3331090001 HH PPS REVENUE CREDIT

## 2018-01-23 ENCOUNTER — OFFICE VISIT (OUTPATIENT)
Dept: INTERNAL MEDICINE CLINIC | Age: 68
End: 2018-01-23

## 2018-01-23 VITALS
OXYGEN SATURATION: 97 % | RESPIRATION RATE: 18 BRPM | HEART RATE: 82 BPM | SYSTOLIC BLOOD PRESSURE: 112 MMHG | TEMPERATURE: 98 F | DIASTOLIC BLOOD PRESSURE: 66 MMHG

## 2018-01-23 VITALS
RESPIRATION RATE: 16 BRPM | SYSTOLIC BLOOD PRESSURE: 122 MMHG | HEIGHT: 68 IN | HEART RATE: 116 BPM | DIASTOLIC BLOOD PRESSURE: 79 MMHG | TEMPERATURE: 97.4 F | OXYGEN SATURATION: 99 % | WEIGHT: 175 LBS | BODY MASS INDEX: 26.52 KG/M2

## 2018-01-23 VITALS
RESPIRATION RATE: 20 BRPM | OXYGEN SATURATION: 98 % | SYSTOLIC BLOOD PRESSURE: 130 MMHG | HEART RATE: 84 BPM | TEMPERATURE: 98.2 F | DIASTOLIC BLOOD PRESSURE: 70 MMHG

## 2018-01-23 DIAGNOSIS — G00.9 MENINGITIS DUE TO BACTERIA: Primary | ICD-10-CM

## 2018-01-23 PROCEDURE — 3331090001 HH PPS REVENUE CREDIT

## 2018-01-23 PROCEDURE — 3331090002 HH PPS REVENUE DEBIT

## 2018-01-23 NOTE — MR AVS SNAPSHOT
[address] [place] [address] 102 [facility] 13 
[phone] Patient: Jm Sultana MRN: [id number] XCR:2/1/4226 Visit Information Date & Time Provider Department Dept. Phone Encounter #  
 1/23/2018 10:40 AM Kt Landaverde, [address] [facility] Internal Medicine [phone] [id number] Upcoming Health Maintenance Date Due Hepatitis C Screening 1950 DTaP/Tdap/Td series (1 - Tdap) 1/7/1970 ZOSTER VACCINE AGE 60> 11/7/2008 GLAUCOMA SCREENING Q2Y 1/7/2014 Pneumococcal 65+ Low/Medium Risk (1 of 2 - PCV13) 1/7/2014 MEDICARE YEARLY EXAM 1/7/2014 Influenza Age 5 to Adult 8/1/2017 FOBT Q 1 YEAR AGE 50-75 1/11/2019 Allergies as of 1/23/2018  Review Complete On: 1/23/2018 By: Rohit Camacho LPN Severity Noted Reaction Type Reactions Hurricaine [Benzocaine] High 01/09/2018   Systemic Other (comments) Methemoglobinemia Current Immunizations  Never Reviewed No immunizations on file. Not reviewed this visit You Were Diagnosed With   
  
 Codes Comments Meningitis due to bacteria    -  Primary ICD-10-CM: G00.9 ICD-9-CM: 320.9 Vitals BP Pulse Temp Resp Height(growth percentile) Weight(growth percentile) 122/79 (BP 1 Location: Left arm, BP Patient Position: Sitting) (!) 116 97.4 °F (36.3 °C) (Oral) 16 5' 8\" (1.727 m) 175 lb (79.4 kg) SpO2 BMI Smoking Status 99% 26.61 kg/m2 Former Smoker Vitals History BMI and BSA Data Body Mass Index Body Surface Area  
 26.61 kg/m 2 1.95 m 2 Preferred Pharmacy Pharmacy Name Phone [address] [place] [address] [address] [phone] Your Updated Medication List  
  
   
This list is accurate as of: 1/23/18 12:01 PM.  Always use your most recent med list.  
  
  
  
  
 albuterol 90 mcg/actuation inhaler Commonly known as:  PROVENTIL HFA, VENTOLIN HFA, PROAIR HFA Take 2 Puffs by inhalation every six (6) hours as needed for Wheezing. AVAPRO 150 mg tablet Generic drug:  irbesartan Take 150 mg by mouth nightly. butalbital-acetaminophen-caffeine -40 mg per tablet Commonly known as:  [facility] Take 1 Tab by mouth every four (4) hours as needed for Headache. Indications: TENSION-TYPE HEADACHE  
  
 cefTRIAXone 1 gram injection Commonly known as:  ROCEPHIN  
2 g by IntraVENous route every twelve (12) hours every twelve (12) hours. cefTRIAXone 2 gram 2 g, ADDaptor 1 Device IVPB  
2 g by IntraVENous route every twelve (12) hours every twelve (12) hours. FLOMAX 0.4 mg capsule Generic drug:  tamsulosin Take 0.4 mg by mouth daily. FLONASE 50 mcg/actuation nasal spray Generic drug:  fluticasone 2 Sprays by Both Nostrils route daily. guaiFENesin  mg ER tablet Commonly known as:  [place] [address] Take 1 Tab by mouth every twelve (12) hours. HEPARIN FLUSH 10 unit/mL Kit Generic drug:  heparin (porcine) in ns 3 mL by IntraVENous route daily. after final saline flush using s-a-s-h technique MARIA ELENA-SYNEPHRINE 12 H SPR (OXYM) 0.05 % nasal spray Generic drug:  oxymetazoline 2 Sprays two (2) times daily as needed. NORMAL SALINE FLUSH INJECTION 10 mL by IntraVENous route daily. before and after each antibiotic infusion Omeprazole delayed release 20 mg tablet Commonly known as:  PRILOSEC D/R Take 20 mg by mouth daily. ondansetron 4 mg disintegrating tablet Commonly known as:  ZOFRAN ODT Take 1 Tab by mouth every four (4) hours as needed. oxyCODONE IR 5 mg immediate release tablet Commonly known as:  [place] Take 1 Tab by mouth every four (4) hours as needed. Max Daily Amount: 30 mg.   
  
  
  
  
To-Do List   
 01/24/2018 11:15 AM  
  Appointment with Mitchell Sewell RN at [facility] SCHEDULING  
  
 01/29/2018 To Be Determined Appointment with Romana Balderas RN at [place] [address]  
  
 02/01/2018 To Be Determined Appointment with Romana Balderas RN at [place] [address] Patient Instructions You are here for a follow up after your recent hospitalization -  
1) Pneumococcal bacteremia- meningitis, mastoiditis and pneumonia- treated with IV ceftriaxone for 3 weeks- midline can be removed Recently treated Flu- H3 N2 Recommend 1) Prevnar 13 vaccination for pneumonia followed by Pneumovac 23 in a year 2) Influenza vaccine- quadrivalent - Both these vaccines can be taken in a week's time- at the same time Talk to your PCP regarding other age related vaccines ( TdaP/Shingles/) later 4) high protein diet,With lot of antioxidants Introducing [facility]! Yamilka Moran introduces GeoPage patient portal. Now you can access parts of your medical record, email your doctor's office, and request medication refills online. 1. In your internet browser, go to [email or web address] 2. Click on the First Time User? Click Here link in the Sign In box. You will see the New Member Sign Up page. 3. Enter your GeoPage Access Code exactly as it appears below. You will not need to use this code after youve completed the sign-up process. If you do not sign up before the expiration date, you must request a new code. · GeoPage Access Code: [email or web address] Expires: 4/2/2018 10:47 AM 
 
4. Enter the last four digits of your Social Security Number (xxxx) and Date of Birth (mm/dd/yyyy) as indicated and click Submit. You will be taken to the next sign-up page. 5. Create a TouchMailt ID. This will be your GeoPage login ID and cannot be changed, so think of one that is secure and easy to remember. 6. Create a TouchMailt password. You can change your password at any time. 7. Enter your Password Reset Question and Answer. This can be used at a later time if you forget your password. 8. Enter your e-mail address. You will receive e-mail notification when new information is available in [address] 9. Click Sign Up. You can now view and download portions of your medical record. 10. Click the Download Summary menu link to download a portable copy of your medical information. If you have questions, please visit the Frequently Asked Questions section of the [employer] website. Remember, [employer] is NOT to be used for urgent needs. For medical emergencies, dial 911. Now available from your iPhone and Android! Please provide this summary of care documentation to your next provider. Your primary care clinician is listed as Joe Horn. If you have any questions after today's visit, please call [phone].

## 2018-01-23 NOTE — PATIENT INSTRUCTIONS
You are here for a follow up after your recent hospitalization -   1) Pneumococcal bacteremia- meningitis, mastoiditis and pneumonia- treated with IV ceftriaxone for 3 weeks- midline can be removed  Recently treated Flu- H3 N2    Recommend  1) Prevnar 13 vaccination for pneumonia followed by Pneumovac 23 in a year  2) Influenza vaccine- quadrivalent -   Both these vaccines can be taken in a week's time- at the same time  Talk to your PCP regarding other age related vaccines ( TdaP/Shingles/) later  4) high protein diet,With lot of antioxidants
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
## 2018-01-24 PROCEDURE — 3331090001 HH PPS REVENUE CREDIT

## 2018-01-24 PROCEDURE — 3331090002 HH PPS REVENUE DEBIT

## 2018-01-25 PROCEDURE — 3331090002 HH PPS REVENUE DEBIT

## 2018-01-25 PROCEDURE — 3331090001 HH PPS REVENUE CREDIT

## 2018-01-26 ENCOUNTER — HOME CARE VISIT (OUTPATIENT)
Dept: SCHEDULING | Facility: HOME HEALTH | Age: 68
End: 2018-01-26
Payer: MEDICARE

## 2018-01-26 VITALS
OXYGEN SATURATION: 96 % | DIASTOLIC BLOOD PRESSURE: 70 MMHG | RESPIRATION RATE: 18 BRPM | HEART RATE: 74 BPM | TEMPERATURE: 98.8 F | SYSTOLIC BLOOD PRESSURE: 126 MMHG

## 2018-01-26 PROCEDURE — G0299 HHS/HOSPICE OF RN EA 15 MIN: HCPCS

## 2018-01-26 PROCEDURE — 3331090001 HH PPS REVENUE CREDIT

## 2018-01-26 PROCEDURE — 3331090002 HH PPS REVENUE DEBIT

## 2018-01-27 PROCEDURE — 3331090001 HH PPS REVENUE CREDIT

## 2018-01-27 PROCEDURE — 3331090002 HH PPS REVENUE DEBIT

## 2018-01-28 PROCEDURE — 3331090002 HH PPS REVENUE DEBIT

## 2018-01-28 PROCEDURE — 3331090001 HH PPS REVENUE CREDIT

## 2018-01-29 ENCOUNTER — HOME CARE VISIT (OUTPATIENT)
Dept: SCHEDULING | Facility: HOME HEALTH | Age: 68
End: 2018-01-29
Payer: MEDICARE

## 2018-01-29 PROCEDURE — 3331090002 HH PPS REVENUE DEBIT

## 2018-01-29 PROCEDURE — G0299 HHS/HOSPICE OF RN EA 15 MIN: HCPCS

## 2018-01-29 PROCEDURE — 3331090001 HH PPS REVENUE CREDIT

## 2018-01-30 VITALS
TEMPERATURE: 98.2 F | OXYGEN SATURATION: 98 % | DIASTOLIC BLOOD PRESSURE: 70 MMHG | RESPIRATION RATE: 20 BRPM | HEART RATE: 84 BPM | SYSTOLIC BLOOD PRESSURE: 124 MMHG

## 2018-01-30 PROCEDURE — 3331090001 HH PPS REVENUE CREDIT

## 2018-01-30 PROCEDURE — 3331090002 HH PPS REVENUE DEBIT

## 2018-01-31 PROCEDURE — 3331090001 HH PPS REVENUE CREDIT

## 2018-01-31 PROCEDURE — 3331090002 HH PPS REVENUE DEBIT

## 2018-02-01 ENCOUNTER — HOME CARE VISIT (OUTPATIENT)
Dept: SCHEDULING | Facility: HOME HEALTH | Age: 68
End: 2018-02-01
Payer: MEDICARE

## 2018-02-01 PROCEDURE — 3331090002 HH PPS REVENUE DEBIT

## 2018-02-01 PROCEDURE — G0299 HHS/HOSPICE OF RN EA 15 MIN: HCPCS

## 2018-02-01 PROCEDURE — 3331090001 HH PPS REVENUE CREDIT

## 2018-02-01 PROCEDURE — 3331090003 HH PPS REVENUE ADJ

## 2018-02-02 VITALS
OXYGEN SATURATION: 98 % | SYSTOLIC BLOOD PRESSURE: 118 MMHG | TEMPERATURE: 98.4 F | DIASTOLIC BLOOD PRESSURE: 70 MMHG | RESPIRATION RATE: 20 BRPM | HEART RATE: 84 BPM

## 2018-02-05 LAB
BACTERIA SPEC CULT: NORMAL
SERVICE CMNT-IMP: NORMAL

## 2021-06-07 ENCOUNTER — TRANSCRIBE ORDER (OUTPATIENT)
Dept: REGISTRATION | Age: 71
End: 2021-06-07

## 2021-06-07 ENCOUNTER — HOSPITAL ENCOUNTER (OUTPATIENT)
Dept: GENERAL RADIOLOGY | Age: 71
Discharge: HOME | End: 2021-06-07
Payer: MEDICARE

## 2021-06-07 DIAGNOSIS — R06.00 DYSPNEA: Primary | ICD-10-CM

## 2021-06-07 DIAGNOSIS — R06.00 DYSPNEA: ICD-10-CM

## 2021-06-07 PROCEDURE — 71046 X-RAY EXAM CHEST 2 VIEWS: CPT

## 2022-03-18 PROBLEM — G00.9 BACTERIAL MENINGITIS: Status: ACTIVE | Noted: 2018-01-02

## 2022-03-18 PROBLEM — A41.9 SEPSIS (HCC): Status: ACTIVE | Noted: 2018-01-11

## 2022-03-19 PROBLEM — G03.9 MENINGITIS: Status: ACTIVE | Noted: 2018-01-11

## 2022-03-19 PROBLEM — J18.9 PNEUMONIA: Status: ACTIVE | Noted: 2018-01-02

## 2022-07-30 ENCOUNTER — HOSPITAL ENCOUNTER (EMERGENCY)
Age: 72
Discharge: HOME | End: 2022-07-30
Attending: EMERGENCY MEDICINE
Payer: MEDICARE

## 2022-07-30 VITALS
WEIGHT: 179 LBS | HEIGHT: 68 IN | RESPIRATION RATE: 14 BRPM | HEART RATE: 85 BPM | BODY MASS INDEX: 27.13 KG/M2 | DIASTOLIC BLOOD PRESSURE: 85 MMHG | TEMPERATURE: 98 F | SYSTOLIC BLOOD PRESSURE: 132 MMHG | OXYGEN SATURATION: 100 %
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
## 2022-07-30 DIAGNOSIS — S61.452A DOG BITE OF LEFT HAND, INITIAL ENCOUNTER: Primary | ICD-10-CM

## 2022-07-30 PROCEDURE — 90471 IMMUNIZATION ADMIN: CPT

## 2022-07-30 PROCEDURE — 99284 EMERGENCY DEPT VISIT MOD MDM: CPT

## 2022-07-30 PROCEDURE — 74011250636 HC RX REV CODE- 250/636: Performed by: EMERGENCY MEDICINE

## 2022-07-30 PROCEDURE — 90715 TDAP VACCINE 7 YRS/> IM: CPT | Performed by: EMERGENCY MEDICINE

## 2022-07-30 RX ORDER — CEPHALEXIN 500 MG/1
500 CAPSULE ORAL 3 TIMES DAILY
Qty: 21 CAPSULE | Refills: 0 | Status: SHIPPED | OUTPATIENT
Start: 2022-07-30 | End: 2022-08-06

## 2022-07-30 RX ADMIN — TETANUS TOXOID, REDUCED DIPHTHERIA TOXOID AND ACELLULAR PERTUSSIS VACCINE, ADSORBED 0.5 ML: 5; 2.5; 8; 8; 2.5 SUSPENSION INTRAMUSCULAR at 11:45

## 2022-07-30 NOTE — ED NOTES
Dressing applied to palmar/thenar aspect of left hand where bites/abrasions are present. No bleeding. Pt had previously applied bacitracin. Wrapped with non-adherent pad, cling and tube gauze. Pt tolerated well. I have reviewed discharge instructions with the patient. The patient verbalized understanding. Discharge medications discussed with patient. No questions at this time. Ambulated without difficulty.
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
## 2023-05-09 ENCOUNTER — HOSPITAL ENCOUNTER (OUTPATIENT)
Facility: HOSPITAL | Age: 73
Discharge: HOME | End: 2023-05-12
Payer: MEDICARE

## 2023-05-09 DIAGNOSIS — R05.3 CHRONIC COUGH: ICD-10-CM

## 2023-05-09 PROCEDURE — 71046 X-RAY EXAM CHEST 2 VIEWS: CPT

## 2024-03-12 ENCOUNTER — HOSPITAL ENCOUNTER (OUTPATIENT)
Facility: HOSPITAL | Age: 74
Discharge: HOME | End: 2024-03-15
Payer: MEDICARE

## 2024-03-12 ENCOUNTER — TRANSCRIBE ORDERS (OUTPATIENT)
Facility: HOSPITAL | Age: 74
End: 2024-03-12

## 2024-03-12 DIAGNOSIS — R06.02 SHORTNESS OF BREATH: ICD-10-CM

## 2024-03-12 DIAGNOSIS — R06.02 SHORTNESS OF BREATH: Primary | ICD-10-CM

## 2024-03-12 PROCEDURE — 71046 X-RAY EXAM CHEST 2 VIEWS: CPT

## 2025-03-24 ENCOUNTER — TRANSCRIBE ORDERS (OUTPATIENT)
Facility: HOSPITAL | Age: 75
End: 2025-03-24

## 2025-03-24 ENCOUNTER — HOSPITAL ENCOUNTER (OUTPATIENT)
Facility: HOSPITAL | Age: 75
Discharge: HOME | End: 2025-03-27
Payer: MEDICARE

## 2025-03-24 DIAGNOSIS — R06.00 DYSPNEA, UNSPECIFIED TYPE: ICD-10-CM

## 2025-03-24 DIAGNOSIS — R06.00 DYSPNEA, UNSPECIFIED TYPE: Primary | ICD-10-CM

## 2025-03-24 PROCEDURE — 71046 X-RAY EXAM CHEST 2 VIEWS: CPT

## (undated) DEVICE — ROCKER SWITCH PENCIL BLADE ELECTRODE, HOLSTER: Brand: EDGE

## (undated) DEVICE — 4.0MM ROUND FLUTED AGGRESSIVE

## (undated) DEVICE — HANDLE LT SNAP ON ULT DURABLE LENS FOR TRUMPF ALC DISPOSABLE

## (undated) DEVICE — GOWN,SIRUS,NONRNF,SETINSLV,XL,20/CS: Brand: MEDLINE

## (undated) DEVICE — BONE WAX WHITE: Brand: BONE WAX WHITE

## (undated) DEVICE — SUTURE VCRL SZ 3-0 L18IN ABSRB UD PS-2 L19MM 3/8 CRV PRIM J497H

## (undated) DEVICE — SOLUTION IRRIG 1000ML H2O STRL BLT

## (undated) DEVICE — 3000CC GUARDIAN II: Brand: GUARDIAN

## (undated) DEVICE — DEVON™ KNEE AND BODY STRAP 60" X 3" (1.5 M X 7.6 CM): Brand: DEVON

## (undated) DEVICE — INSULATED BLADE ELECTRODE: Brand: EDGE

## (undated) DEVICE — 6.0MM ROUND FLUTED AGGRESSIVE

## (undated) DEVICE — NEEDLE HYPO 25GA L1.5IN BLU POLYPR HUB S STL REG BVL STR

## (undated) DEVICE — ELECTRODE 8227410 PAIRED 2 CH SET ROHS

## (undated) DEVICE — TRAY PREP DRY W/ PREM GLV 2 APPL 6 SPNG 2 UNDPD 1 OVERWRAP

## (undated) DEVICE — DERMABOND SKIN ADH 0.7ML -- DERMABOND ADVANCED 12/BX

## (undated) DEVICE — SYR 10ML CTRL LR LCK NSAF LF --

## (undated) DEVICE — NEUROLOGICAL DRAPE: Brand: CONVERTORS

## (undated) DEVICE — REM POLYHESIVE ADULT PATIENT RETURN ELECTRODE: Brand: VALLEYLAB

## (undated) DEVICE — SWAB CULT DBL W/O CHAR RAYON TIP AMIES GEL CLMN FOR COLL

## (undated) DEVICE — PACK,EENT,TURBAN DRAPE,PK II: Brand: MEDLINE

## (undated) DEVICE — STERILE POLYISOPRENE POWDER-FREE SURGICAL GLOVES WITH EMOLLIENT COATING: Brand: PROTEXIS

## (undated) DEVICE — INFECTION CONTROL KIT SYS

## (undated) DEVICE — SOLUTION IV 1000ML 0.9% SOD CHL

## (undated) DEVICE — SOLUTION IRRIG 3000ML 0.9% SOD CHL FLX CONT 0797208] ICU MEDICAL INC]

## (undated) DEVICE — MASTISOL ADHESIVE LIQ 2/3ML

## (undated) DEVICE — BLADE OPHTH MINI BEAV SHRP --

## (undated) DEVICE — SYR IRR BLB 2OZ DISP BLU STRL -- CONVERT TO ITEM 357637

## (undated) DEVICE — WIPE 400300 MEROCEL 20PK INSTRUMENT: Brand: MEROCEL®

## (undated) DEVICE — BLADE TYMPLSTY W2.5MM 60DEG SHRP ALL ARND BVL DN

## (undated) DEVICE — TOWEL SURG W17XL27IN STD BLU COT NONFENESTRATED PREWASHED

## (undated) DEVICE — DRAPE MICSCP W46XL120IN FOR ZEISS MD

## (undated) DEVICE — BIPOLAR FORCEPS CORD,BANANA LEADS: Brand: VALLEYLAB

## (undated) DEVICE — DRESSING EAR AD 5.5IN GLSCOCK

## (undated) DEVICE — SURGICAL PROCEDURE PACK BASIN MAJ SET CUST NO CAUT

## (undated) DEVICE — KENDALL SCD EXPRESS SLEEVES, KNEE LENGTH, MEDIUM: Brand: KENDALL SCD

## (undated) DEVICE — TUBING SUCT MASTOID TWO IRRIG SPIK ALL IN 1 LTWT FLX LEVIN

## (undated) DEVICE — 1010 S-DRAPE TOWEL DRAPE 10/BX: Brand: STERI-DRAPE™

## (undated) DEVICE — SYR LR LCK 1ML GRAD NSAF 30ML --

## (undated) DEVICE — BLADE ASSEMB CLP HAIR FINE --